# Patient Record
Sex: FEMALE | Race: WHITE | URBAN - METROPOLITAN AREA
[De-identification: names, ages, dates, MRNs, and addresses within clinical notes are randomized per-mention and may not be internally consistent; named-entity substitution may affect disease eponyms.]

---

## 2018-05-23 ENCOUNTER — HOSPITAL ENCOUNTER (OUTPATIENT)
Age: 83
Setting detail: SPECIMEN
Discharge: HOME | End: 2018-05-23
Payer: COMMERCIAL

## 2018-05-25 LAB — DERMATOLOGY PATHOLOGY REPORT: NORMAL

## 2018-12-11 ENCOUNTER — HOSPITAL ENCOUNTER (OUTPATIENT)
Age: 83
Setting detail: SPECIMEN
Discharge: HOME | End: 2018-12-11
Payer: MEDICARE

## 2018-12-13 LAB — DERMATOLOGY PATHOLOGY REPORT: NORMAL

## 2021-03-22 ENCOUNTER — TELEPHONE (OUTPATIENT)
Dept: GASTROENTEROLOGY | Age: 86
End: 2021-03-22

## 2021-04-08 ENCOUNTER — OFFICE VISIT (OUTPATIENT)
Dept: GASTROENTEROLOGY | Age: 86
End: 2021-04-08
Payer: COMMERCIAL

## 2021-04-08 VITALS
WEIGHT: 163 LBS | RESPIRATION RATE: 16 BRPM | SYSTOLIC BLOOD PRESSURE: 190 MMHG | DIASTOLIC BLOOD PRESSURE: 83 MMHG | HEIGHT: 66 IN | BODY MASS INDEX: 26.2 KG/M2 | TEMPERATURE: 97.7 F

## 2021-04-08 DIAGNOSIS — R19.7 DIARRHEA, UNSPECIFIED TYPE: Primary | ICD-10-CM

## 2021-04-08 PROCEDURE — 99204 OFFICE O/P NEW MOD 45 MIN: CPT | Performed by: INTERNAL MEDICINE

## 2021-04-08 RX ORDER — DIPHENOXYLATE HYDROCHLORIDE AND ATROPINE SULFATE 2.5; .025 MG/1; MG/1
1 TABLET ORAL 4 TIMES DAILY PRN
Qty: 40 TABLET | Refills: 1 | Status: SHIPPED | OUTPATIENT
Start: 2021-04-08 | End: 2021-04-18

## 2021-04-08 RX ORDER — MONTELUKAST SODIUM 4 MG/1
1 TABLET, CHEWABLE ORAL 2 TIMES DAILY
Qty: 60 TABLET | Refills: 3 | Status: SHIPPED | OUTPATIENT
Start: 2021-04-08

## 2021-04-08 ASSESSMENT — ENCOUNTER SYMPTOMS
TROUBLE SWALLOWING: 0
ABDOMINAL PAIN: 1
NAUSEA: 0
BLOOD IN STOOL: 0
CONSTIPATION: 0
DIARRHEA: 1
CHOKING: 0
COUGH: 0
VOMITING: 0
WHEEZING: 0
ABDOMINAL DISTENTION: 0
ANAL BLEEDING: 0
RECTAL PAIN: 0

## 2021-04-08 NOTE — PROGRESS NOTES
Reason for Referral:   No referring provider defined for this encounter. Chief Complaint   Patient presents with    Diarrhea     Patient is new, referred for diarrhea. She states she has had diarrhea since she had her gallbladder out years ago. She states she got COVID last year and was put on abx. After that, she has had severe diarrhea. She states her last 2 colonoscopies were unable to finish because of \"a bowel obstruction\"           HISTORY OF PRESENT ILLNESS: Pat Nash is a 80 y.o. female , referred for evaluation ofdiarrhea  . This pleasant elderly lady is here for the first time, referred because of  diarrha as above, been seen by  for that couple trials of colonoscopy were not successful to complete, no biopsies were taken from the colon. She had this diarrhea for at least 60 years she said, she has been told multiple things about it and never left her, and right now is getting very disturbing and she is very limited and not able to enjoy her life at this time. The patient is remarkably oriented and smart and knows her medical history very well. She thinks the diarrhea started just when she had her gallbladder done years ago. Denied any blood in the stool, depends about what she eats she might have 3-4 bowel movements a day. No weight loss but she cannot keep weight on her she said because of the diarrhea. She denied any fever or chills and review of system with her otherwise on remarkable  Labs in 2014: anemia, elevated LFts)   but now this year labs in [facility], normal LFts and HGB , liver low last year only, when she thinks had  Katherine Finders /2020, this is before the Covid was known and there is no confirmation for it but the patient's were her life that is what happened because she lost her smell and taste and had cold symptoms and respiratory issues.   At present  Diarrhea 3x /day gas and bloating   Ct 3/2020 : negative   Denied any other symptoms  Findings:  Terminal ileum: not examined    Cecum/Ascending colon: not examined    Transverse colon: I believe I was able to reach proximal transverse colon visualization somewhat limited because of several blind spots in the colon    Descending/Sigmoid colon: Very redundant tortuous sigmoid colon. Sigmoid diverticulosis. Fair amount of blind spots in the colon. Rectum/Anus: examined in normal and retroflexed positions and was abnormal: Low rectal polyp. Removed with large cold biopsy forceps. Withdrawal Time was (minutes): 20    The colon was decompressed and the scope was removed. The patient tolerated the procedure well. Recommendations/Plan:   1. Lifestyle and dietary modifications as discussed  2. F/U Biopsies  3. F/U In Office in 3-4 weeks  4. Discussed with the family  5. Air contrast barium enema as an outpatient. 6. Office follow-up after the barium enema. Electronically signed by Vandana Nielson MD on 2/20/2020 at 10:59 AM              Past Medical,Family, and Social History reviewed and does contribute to the patient presentingcondition. Patient's PMH/PSH,SH,PSYCH Hx, MEDs, ALLERGIES, and ROS were all reviewed and updated in the appropriate sections.     PAST MEDICAL HISTORY:  Past Medical History:   Diagnosis Date    Accelerating angina ([address]     Arthritis     CAD (coronary artery disease) 5*9/14    cabg    Carotid bruit     bilateral     DM (diabetes mellitus) ([address]     HTN (hypertension)     Hypothyroid     Mitral regurgitation     mild     Paroxysmal A-fib ([address]     Not on coumain per pt's wishes     Pericarditis     2009       Past Surgical History:   Procedure Laterality Date    BREAST LUMPECTOMY      CARDIAC CATHETERIZATION      2009, non critical at that time   [address] GRAFT  5/9/2014    CABG X 6    HYSTERECTOMY      SHOULDER SURGERY      TONSILLECTOMY         CURRENT MEDICATIONS:    Current Outpatient Medications:    colestipol (COLESTID) 1 g tablet, Take 1 tablet by mouth 2 times daily, Disp: 60 tablet, Rfl: 3    diphenoxylate-atropine (DIPHENATOL) 2.5-0.025 MG per tablet, Take 1 tablet by mouth 4 times daily as needed for Diarrhea for up to 10 days. , Disp: 40 tablet, Rfl: 1    levothyroxine (SYNTHROID) 150 MCG tablet, Take 150 mcg by mouth Daily. , Disp: , Rfl:     aspirin 81 MG tablet, Take 81 mg by mouth daily. , Disp: , Rfl:     Coenzyme Q-10 100 MG CAPS, Take 1 tablet by mouth daily. , Disp: , Rfl:     atorvastatin (LIPITOR) 10 MG tablet, Take 10 mg by mouth daily. , Disp: , Rfl:     Multiple Vitamins-Minerals (THERAPEUTIC MULTIVITAMIN-MINERALS) tablet, Take 1 tablet by mouth daily. , Disp: , Rfl:     estrogens, conjugated, (PREMARIN) 0.3 MG tablet, Take 0.3 mg by mouth daily. , Disp: , Rfl:     metoprolol (TOPROL-XL) 50 MG XL tablet, Take 50 mg by mouth daily. , Disp: , Rfl:     ALLERGIES:   Allergies   Allergen Reactions    Codeine Palpitations       FAMILY HISTORY:       Problem Relation Age of Onset    Coronary Art Dis Mother     Coronary Art Dis Father          SOCIAL HISTORY:   Social History     Socioeconomic History    Marital status:       Spouse name: Not on file    Number of children: Not on file    Years of education: Not on file    Highest education level: Not on file   Occupational History    Not on file   Social Needs    Financial resource strain: Not on file    Food insecurity     Worry: Not on file     Inability: Not on file    Transportation needs     Medical: Not on file     Non-medical: Not on file   Tobacco Use    Smoking status: Never Smoker    Smokeless tobacco: Never Used   Substance and Sexual Activity    Alcohol use: No    Drug use: No    Sexual activity: Not on file   Lifestyle    Physical activity     Days per week: Not on file     Minutes per session: Not on file    Stress: Not on file   Relationships    Social connections     Talks on phone: Not on file     Gets together: Not on file     Attends Buddhist service: Not on file     Active member of club or organization: Not on file     Attends meetings of clubs or organizations: Not on file     Relationship status: Not on file    Intimate partner violence     Fear of current or ex partner: Not on file     Emotionally abused: Not on file     Physically abused: Not on file     Forced sexual activity: Not on file   Other Topics Concern    Not on file   Social History Narrative    Not on file       REVIEW OF SYSTEMS: A 12-point review of systemswas obtained and pertinent positives and negatives were enumerated above in the history of present illness. All other reviewed systems / symptoms were negative. Review of Systems   Constitutional: Positive for fatigue. Negative for appetite change and unexpected weight change. HENT: Negative for trouble swallowing. Eyes: Positive for visual disturbance (glasses). Respiratory: Negative for cough, choking and wheezing. Cardiovascular: Negative for chest pain, palpitations and leg swelling. Gastrointestinal: Positive for abdominal pain and diarrhea. Negative for abdominal distention, anal bleeding, blood in stool, constipation, nausea, rectal pain and vomiting. Genitourinary: Negative for difficulty urinating. Allergic/Immunologic: Negative for environmental allergies and food allergies. Neurological: Negative for dizziness, weakness, light-headedness, numbness and headaches. Hematological: Bruises/bleeds easily. Psychiatric/Behavioral: Negative for sleep disturbance. The patient is not nervous/anxious.             LABORATORY DATA: Reviewed  Lab Results   Component Value Date    WBC 7.1 05/14/2014    HGB 8.7 (L) 05/14/2014    HCT 25.7 (L) 05/14/2014    .1 (H) 05/14/2014     05/14/2014     05/14/2014    K 3.9 05/14/2014    CL 99 05/14/2014    CO2 29 05/14/2014    BUN 19 05/14/2014    CREATININE 0.71 05/14/2014    LABALBU 3.8 05/08/2014    BILITOT or rash. Comments: She is not diaphoretic   Neurological:      Mental Status: She is alert and oriented to person, place, and time. Deep Tendon Reflexes: Reflexes are normal and symmetric. Psychiatric:         Behavior: Behavior normal.         Thought Content: Thought content normal.         Judgment: Judgment normal.         IMPRESSION: Ms. Aldo Monterroso is a 80 y.o. female with      Diagnosis Orders   1. 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 Thank you for allowing me to participate in the care of Ms. Aldo Monterroso. For any further questions please do not hesitate to contact me. I have reviewed and agree with the MA/RN ROS. Note is dictated utilizing voice recognition software. Unfortunately this leads to occasional typographical errors. Please contact our office if you have any questions.     Ranulfo Vicente MD  [facility]  O: #[phone]

## 2021-05-27 ENCOUNTER — OFFICE VISIT (OUTPATIENT)
Dept: GASTROENTEROLOGY | Age: 86
End: 2021-05-27
Payer: COMMERCIAL

## 2021-05-27 VITALS
BODY MASS INDEX: 27.16 KG/M2 | WEIGHT: 163 LBS | HEIGHT: 65 IN | DIASTOLIC BLOOD PRESSURE: 59 MMHG | SYSTOLIC BLOOD PRESSURE: 129 MMHG

## 2021-05-27 DIAGNOSIS — R19.7 DIARRHEA, UNSPECIFIED TYPE: Primary | ICD-10-CM

## 2021-05-27 DIAGNOSIS — R14.0 BLOATING: ICD-10-CM

## 2021-05-27 PROCEDURE — 99214 OFFICE O/P EST MOD 30 MIN: CPT | Performed by: INTERNAL MEDICINE

## 2021-05-27 RX ORDER — TRIAMTERENE AND HYDROCHLOROTHIAZIDE 37.5; 25 MG/1; MG/1
CAPSULE ORAL DAILY
COMMUNITY
Start: 2021-01-19

## 2021-05-27 RX ORDER — GREEN TEA/HOODIA GORDONII 315-12.5MG
1 CAPSULE ORAL DAILY
Qty: 30 TABLET | Refills: 0 | Status: SHIPPED | OUTPATIENT
Start: 2021-05-27 | End: 2021-06-26

## 2021-05-27 RX ORDER — ISOSORBIDE MONONITRATE 60 MG/1
60 TABLET, EXTENDED RELEASE ORAL DAILY
COMMUNITY
Start: 2020-11-05

## 2021-05-27 RX ORDER — NITROGLYCERIN 0.4 MG/1
0.4 TABLET SUBLINGUAL EVERY 5 MIN PRN
COMMUNITY

## 2021-05-27 ASSESSMENT — ENCOUNTER SYMPTOMS
GASTROINTESTINAL NEGATIVE: 1
ALLERGIC/IMMUNOLOGIC NEGATIVE: 1
EYES NEGATIVE: 1
RESPIRATORY NEGATIVE: 1

## 2021-05-27 NOTE — PROGRESS NOTES
GI CLINIC FOLLOW UP    INTERVAL HISTORY:   No referring provider defined for this encounter. Chief Complaint   Patient presents with    Diarrhea     per patient this is getting a lot better with the med that was prescribed     Gas     pt states that she is still having gas            HISTORY OF PRESENT ILLNESS: Fabrizio Reyes is a 80 y.o. female , referred for evaluation of*diarrhea  Patient is here for follow-up we have seen her last visit for diarrhea, because of her age and her wellness not to be studied with a lot of testing, I started her on  Colestid, she said this medication has resolved the diarrhea, she did not need the lomotil  Today she is saying that she been having a lot of gas, and a lot of flatus. Denied any bleeding denied any weight loss eating and drinking well she has no odynophagia no dysphagia no change in her bowel habits no bleeding  In 2014 she did have anemia and elevated liver enzymes on labs from [address] [facility] but when I looked at her labs from [address] [place] [address] in the last year everything was normal     past Medical,Family, and Social History reviewed and does contribute to the patient presentingcondition. Patient's PMH/PSH,SH,PSYCH Hx, MEDs, ALLERGIES, and ROS were all reviewed and updated in the appropriate sections.     PAST MEDICAL HISTORY:  Past Medical History:   Diagnosis Date    Accelerating angina ([address]     Arthritis     CAD (coronary artery disease) 5*9/14    cabg    Carotid bruit     bilateral     DM (diabetes mellitus) ([address]     HTN (hypertension)     Hypothyroid     Mitral regurgitation     mild     Paroxysmal A-fib ([address]     Not on coumain per pt's wishes     Pericarditis     2009       Past Surgical History:   Procedure Laterality Date    BREAST LUMPECTOMY      CARDIAC CATHETERIZATION      2009, non critical at that time   [address] GRAFT  5/9/2014    CABG X 6    HYSTERECTOMY      SHOULDER SURGERY      TONSILLECTOMY CURRENT MEDICATIONS:    Current Outpatient Medications:     isosorbide mononitrate (IMDUR) 60 MG extended release tablet, Take 60 mg by mouth daily, Disp: , Rfl:     nitroGLYCERIN (NITROSTAT) 0.4 MG SL tablet, Place 0.4 mg under the tongue every 5 minutes as needed, Disp: , Rfl:     triamterene-hydroCHLOROthiazide (DYAZIDE) 37.5-25 MG per capsule, daily, Disp: , Rfl:     Probiotic Acidophilus (FLORANEX) TABS, Take 1 tablet by mouth daily, Disp: 30 tablet, Rfl: 0    colestipol (COLESTID) 1 g tablet, Take 1 tablet by mouth 2 times daily, Disp: 60 tablet, Rfl: 3    levothyroxine (SYNTHROID) 150 MCG tablet, Take 150 mcg by mouth Daily. , Disp: , Rfl:     aspirin 81 MG tablet, Take 81 mg by mouth daily. , Disp: , Rfl:     Coenzyme Q-10 100 MG CAPS, Take 1 tablet by mouth daily. , Disp: , Rfl:     atorvastatin (LIPITOR) 10 MG tablet, Take 10 mg by mouth daily. , Disp: , Rfl:     Multiple Vitamins-Minerals (THERAPEUTIC MULTIVITAMIN-MINERALS) tablet, Take 1 tablet by mouth daily. , Disp: , Rfl:     estrogens, conjugated, (PREMARIN) 0.3 MG tablet, Take 0.3 mg by mouth daily. , Disp: , Rfl:     metoprolol (TOPROL-XL) 50 MG XL tablet, Take 50 mg by mouth daily. , Disp: , Rfl:     ALLERGIES:   Allergies   Allergen Reactions    Codeine Palpitations       FAMILY HISTORY:       Problem Relation Age of Onset    Coronary Art Dis Mother     Coronary Art Dis Father          SOCIAL HISTORY:   Social History     Socioeconomic History    Marital status:       Spouse name: Not on file    Number of children: Not on file    Years of education: Not on file    Highest education level: Not on file   Occupational History    Not on file   Tobacco Use    Smoking status: Never Smoker    Smokeless tobacco: Never Used   Vaping Use    Vaping Use: Never used   Substance and Sexual Activity    Alcohol use: No    Drug use: No    Sexual activity: Not on file   Other Topics Concern    Not on file   Social History Narrative  Not on file     Social Determinants of Health     Financial Resource Strain:     Difficulty of Paying Living Expenses:    Food Insecurity:     Worried About Running Out of Food in the Last Year:     [address] in the Last Year:    Transportation Needs:     Lack of Transportation (Medical):  Lack of Transportation (Non-Medical):    Physical Activity:     Days of Exercise per Week:     Minutes of Exercise per Session:    Stress:     Feeling of Stress :    Social Connections:     Frequency of Communication with Friends and Family:     Frequency of Social Gatherings with Friends and Family:     Attends Hinduism Services:     Active Member of Clubs or Organizations:     Attends Club or Organization Meetings:     Marital Status:    Intimate Partner Violence:     Fear of Current or Ex-Partner:     Emotionally Abused:     Physically Abused:     Sexually Abused:        REVIEW OF SYSTEMS: A 12-point review of systemswas obtained and pertinent positives and negatives were enumerated above in the history of present illness. All other reviewed systems / symptoms were negative. Review of Systems   Constitutional: Negative. HENT: Negative. Eyes: Negative. Respiratory: Negative. Cardiovascular: Negative. Gastrointestinal: Negative. Endocrine: Negative. Genitourinary: Negative. Musculoskeletal: Negative. Skin: Negative. Allergic/Immunologic: Negative. Neurological: Negative. Hematological: Negative. Psychiatric/Behavioral: Negative.             LABORATORY DATA: Reviewed  Lab Results   Component Value Date    WBC 7.1 05/14/2014    HGB 8.7 (L) 05/14/2014    HCT 25.7 (L) 05/14/2014    .1 (H) 05/14/2014     05/14/2014     05/14/2014    K 3.9 05/14/2014    CL 99 05/14/2014    CO2 29 05/14/2014    BUN 19 05/14/2014    CREATININE 0.71 05/14/2014    LABALBU 3.8 05/08/2014    BILITOT 0.30 05/08/2014    ALKPHOS 52 05/08/2014    AST 38 (H) 05/08/2014 ALT 37 (H) 05/08/2014    INR 1.2 05/09/2014         Lab Results   Component Value Date    RBC 2.54 (L) 05/14/2014    HGB 8.7 (L) 05/14/2014    .1 (H) 05/14/2014    MCH 34.2 (H) 05/14/2014    MCHC 33.8 05/14/2014    RDW 20.8 (H) 05/14/2014    MPV 9.2 05/14/2014    BASOPCT 0 05/11/2014    LYMPHSABS 0.50 (L) 05/11/2014    MONOSABS 0.50 05/11/2014    NEUTROABS 7.90 (H) 05/11/2014    EOSABS 0.00 05/11/2014    BASOSABS 0.00 05/11/2014         DIAGNOSTIC TESTING:     No results found. PHYSICAL EXAMINATION: Vital signs reviewed per the nursing documentation. BP (!) 129/59 (Site: Right Upper Arm, Position: Sitting, Cuff Size: Medium Adult)   Ht 5' 5\" (1.651 m)   Wt 163 lb (73.9 kg)   BMI 27.12 kg/m²   Body mass index is 27.12 kg/m². Physical Exam  Vitals and nursing note reviewed. Constitutional:       General: She is not in acute distress. Appearance: She is well-developed. She is not diaphoretic. HENT:      Head: Normocephalic. Mouth/Throat:      Pharynx: No oropharyngeal exudate. Eyes:      General: No scleral icterus. Pupils: Pupils are equal, round, and reactive to light. Neck:      Thyroid: No thyromegaly. Vascular: No JVD. Trachea: No tracheal deviation. Cardiovascular:      Rate and Rhythm: Normal rate and regular rhythm. Heart sounds: Normal heart sounds. No murmur heard. Pulmonary:      Effort: Pulmonary effort is normal. No respiratory distress. Breath sounds: Normal breath sounds. No wheezing. Abdominal:      General: Bowel sounds are normal. There is no distension. Palpations: Abdomen is soft. Tenderness: There is no abdominal tenderness. There is no guarding or rebound. Comments: No ascites   Musculoskeletal:         General: Normal range of motion. Cervical back: Normal range of motion and neck supple. Skin:     General: Skin is warm. Coloration: Skin is not pale. Findings: No erythema or rash.       Comments:

## 2021-06-22 RX ORDER — PROBIOTIC PRODUCT - TAB 1B-250 MG
TAB ORAL
Qty: 30 TABLET | Refills: 0 | Status: SHIPPED | OUTPATIENT
Start: 2021-06-22

## 2021-06-29 ENCOUNTER — TELEPHONE (OUTPATIENT)
Dept: GASTROENTEROLOGY | Age: 86
End: 2021-06-29

## 2021-06-29 NOTE — TELEPHONE ENCOUNTER
Patient called office stating that she still has diarrhea. Thinks that she has a bowel obstruction. Would like a call back to discuss.

## 2021-06-30 NOTE — TELEPHONE ENCOUNTER
Writer LM for patient stating if she still needs assistance to call office.   If she is having severe diarrhea and is feeling more tired or weak than normal to go to local ER

## 2022-04-04 ENCOUNTER — HOSPITAL ENCOUNTER (INPATIENT)
Age: 87
LOS: 2 days | Discharge: HOME | DRG: 177 | End: 2022-04-08
Attending: EMERGENCY MEDICINE | Admitting: FAMILY MEDICINE
Payer: MEDICARE

## 2022-04-04 ENCOUNTER — APPOINTMENT (OUTPATIENT)
Dept: GENERAL RADIOLOGY | Age: 87
DRG: 177 | End: 2022-04-04
Payer: MEDICARE

## 2022-04-04 DIAGNOSIS — J10.1 INFLUENZA A: ICD-10-CM

## 2022-04-04 DIAGNOSIS — R06.02 SHORTNESS OF BREATH: Primary | ICD-10-CM

## 2022-04-04 DIAGNOSIS — I10 PRIMARY HYPERTENSION: ICD-10-CM

## 2022-04-04 DIAGNOSIS — I25.10 ASCVD (ARTERIOSCLEROTIC CARDIOVASCULAR DISEASE): ICD-10-CM

## 2022-04-04 DIAGNOSIS — R07.9 CHEST PAIN, UNSPECIFIED TYPE: ICD-10-CM

## 2022-04-04 PROBLEM — J18.9 PNEUMONIA: Status: ACTIVE | Noted: 2022-04-04

## 2022-04-04 PROBLEM — E03.9 HYPOTHYROIDISM: Status: ACTIVE | Noted: 2022-04-04

## 2022-04-04 LAB
ABSOLUTE EOS #: 0 K/UL (ref 0–0.4)
ABSOLUTE LYMPH #: 0.8 K/UL (ref 1–4.8)
ABSOLUTE MONO #: 0.3 K/UL (ref 0.1–1.3)
ANION GAP SERPL CALCULATED.3IONS-SCNC: 17 MMOL/L (ref 9–17)
BASOPHILS # BLD: 0 % (ref 0–2)
BASOPHILS ABSOLUTE: 0 K/UL (ref 0–0.2)
BUN BLDV-MCNC: 30 MG/DL (ref 8–23)
CALCIUM SERPL-MCNC: 9.2 MG/DL (ref 8.6–10.4)
CHLORIDE BLD-SCNC: 100 MMOL/L (ref 98–107)
CO2: 19 MMOL/L (ref 20–31)
CREAT SERPL-MCNC: 1.21 MG/DL (ref 0.5–0.9)
EOSINOPHILS RELATIVE PERCENT: 0 % (ref 0–4)
GFR AFRICAN AMERICAN: 51 ML/MIN
GFR NON-AFRICAN AMERICAN: 42 ML/MIN
GFR SERPL CREATININE-BSD FRML MDRD: ABNORMAL ML/MIN/{1.73_M2}
GLUCOSE BLD-MCNC: 172 MG/DL (ref 70–99)
GLUCOSE BLD-MCNC: 334 MG/DL (ref 65–105)
HCT VFR BLD CALC: 44.6 % (ref 36–46)
HEMOGLOBIN: 15.2 G/DL (ref 12–16)
INFLUENZA A: DETECTED
INFLUENZA B: NOT DETECTED
LYMPHOCYTES # BLD: 14 % (ref 24–44)
MCH RBC QN AUTO: 36.4 PG (ref 26–34)
MCHC RBC AUTO-ENTMCNC: 34 G/DL (ref 31–37)
MCV RBC AUTO: 107 FL (ref 80–100)
MONOCYTES # BLD: 5 % (ref 1–7)
PDW BLD-RTO: 13.5 % (ref 11.5–14.9)
PLATELET # BLD: 164 K/UL (ref 150–450)
PMV BLD AUTO: 9.8 FL (ref 6–12)
POTASSIUM SERPL-SCNC: 5.1 MMOL/L (ref 3.7–5.3)
POTASSIUM SERPL-SCNC: 5.7 MMOL/L (ref 3.7–5.3)
PRO-BNP: 719 PG/ML
RBC # BLD: 4.17 M/UL (ref 4–5.2)
SARS-COV-2 RNA, RT PCR: NOT DETECTED
SEG NEUTROPHILS: 81 % (ref 36–66)
SEGMENTED NEUTROPHILS ABSOLUTE COUNT: 4.5 K/UL (ref 1.3–9.1)
SODIUM BLD-SCNC: 136 MMOL/L (ref 135–144)
SOURCE: ABNORMAL
SPECIMEN DESCRIPTION: ABNORMAL
TROPONIN, HIGH SENSITIVITY: 32 NG/L (ref 0–14)
TROPONIN, HIGH SENSITIVITY: 36 NG/L (ref 0–14)
WBC # BLD: 5.6 K/UL (ref 3.5–11)

## 2022-04-04 PROCEDURE — G0378 HOSPITAL OBSERVATION PER HR: HCPCS

## 2022-04-04 PROCEDURE — 80048 BASIC METABOLIC PNL TOTAL CA: CPT

## 2022-04-04 PROCEDURE — 96372 THER/PROPH/DIAG INJ SC/IM: CPT

## 2022-04-04 PROCEDURE — 83880 ASSAY OF NATRIURETIC PEPTIDE: CPT

## 2022-04-04 PROCEDURE — 96375 TX/PRO/DX INJ NEW DRUG ADDON: CPT

## 2022-04-04 PROCEDURE — 99284 EMERGENCY DEPT VISIT MOD MDM: CPT

## 2022-04-04 PROCEDURE — 96374 THER/PROPH/DIAG INJ IV PUSH: CPT

## 2022-04-04 PROCEDURE — 2580000003 HC RX 258: Performed by: FAMILY MEDICINE

## 2022-04-04 PROCEDURE — 6370000000 HC RX 637 (ALT 250 FOR IP): Performed by: FAMILY MEDICINE

## 2022-04-04 PROCEDURE — 96367 TX/PROPH/DG ADDL SEQ IV INF: CPT

## 2022-04-04 PROCEDURE — 85025 COMPLETE CBC W/AUTO DIFF WBC: CPT

## 2022-04-04 PROCEDURE — 2500000003 HC RX 250 WO HCPCS: Performed by: FAMILY MEDICINE

## 2022-04-04 PROCEDURE — 71045 X-RAY EXAM CHEST 1 VIEW: CPT

## 2022-04-04 PROCEDURE — 2580000003 HC RX 258: Performed by: STUDENT IN AN ORGANIZED HEALTH CARE EDUCATION/TRAINING PROGRAM

## 2022-04-04 PROCEDURE — 6370000000 HC RX 637 (ALT 250 FOR IP): Performed by: STUDENT IN AN ORGANIZED HEALTH CARE EDUCATION/TRAINING PROGRAM

## 2022-04-04 PROCEDURE — 82947 ASSAY GLUCOSE BLOOD QUANT: CPT

## 2022-04-04 PROCEDURE — 2060000000 HC ICU INTERMEDIATE R&B

## 2022-04-04 PROCEDURE — 6360000002 HC RX W HCPCS: Performed by: STUDENT IN AN ORGANIZED HEALTH CARE EDUCATION/TRAINING PROGRAM

## 2022-04-04 PROCEDURE — 96365 THER/PROPH/DIAG IV INF INIT: CPT

## 2022-04-04 PROCEDURE — 84484 ASSAY OF TROPONIN QUANT: CPT

## 2022-04-04 PROCEDURE — 93005 ELECTROCARDIOGRAM TRACING: CPT | Performed by: STUDENT IN AN ORGANIZED HEALTH CARE EDUCATION/TRAINING PROGRAM

## 2022-04-04 PROCEDURE — 87636 SARSCOV2 & INF A&B AMP PRB: CPT

## 2022-04-04 PROCEDURE — 84132 ASSAY OF SERUM POTASSIUM: CPT

## 2022-04-04 PROCEDURE — 36415 COLL VENOUS BLD VENIPUNCTURE: CPT

## 2022-04-04 RX ORDER — LANOLIN ALCOHOL/MO/W.PET/CERES
5 CREAM (GRAM) TOPICAL NIGHTLY PRN
Status: DISCONTINUED | OUTPATIENT
Start: 2022-04-04 | End: 2022-04-08 | Stop reason: HOSPADM

## 2022-04-04 RX ORDER — ATORVASTATIN CALCIUM 20 MG/1
10 TABLET, FILM COATED ORAL DAILY
Status: DISCONTINUED | OUTPATIENT
Start: 2022-04-04 | End: 2022-04-08 | Stop reason: HOSPADM

## 2022-04-04 RX ORDER — POTASSIUM CHLORIDE 20 MEQ/1
40 TABLET, EXTENDED RELEASE ORAL PRN
Status: DISCONTINUED | OUTPATIENT
Start: 2022-04-04 | End: 2022-04-05

## 2022-04-04 RX ORDER — ISOSORBIDE MONONITRATE 60 MG/1
60 TABLET, EXTENDED RELEASE ORAL DAILY
Status: DISCONTINUED | OUTPATIENT
Start: 2022-04-04 | End: 2022-04-08 | Stop reason: HOSPADM

## 2022-04-04 RX ORDER — ONDANSETRON 2 MG/ML
4 INJECTION INTRAMUSCULAR; INTRAVENOUS EVERY 6 HOURS PRN
Status: DISCONTINUED | OUTPATIENT
Start: 2022-04-04 | End: 2022-04-08 | Stop reason: HOSPADM

## 2022-04-04 RX ORDER — ATORVASTATIN CALCIUM 10 MG/1
10 TABLET, FILM COATED ORAL DAILY
COMMUNITY

## 2022-04-04 RX ORDER — SODIUM CHLORIDE 0.9 % (FLUSH) 0.9 %
10 SYRINGE (ML) INJECTION EVERY 12 HOURS SCHEDULED
Status: DISCONTINUED | OUTPATIENT
Start: 2022-04-04 | End: 2022-04-08 | Stop reason: HOSPADM

## 2022-04-04 RX ORDER — SODIUM CHLORIDE 0.9 % (FLUSH) 0.9 %
5-40 SYRINGE (ML) INJECTION PRN
Status: DISCONTINUED | OUTPATIENT
Start: 2022-04-04 | End: 2022-04-08 | Stop reason: HOSPADM

## 2022-04-04 RX ORDER — ESTRADIOL 0.5 MG/1
0.5 TABLET ORAL DAILY
COMMUNITY

## 2022-04-04 RX ORDER — DEXTROSE MONOHYDRATE 25 G/50ML
25 INJECTION, SOLUTION INTRAVENOUS ONCE
Status: COMPLETED | OUTPATIENT
Start: 2022-04-04 | End: 2022-04-04

## 2022-04-04 RX ORDER — SODIUM CHLORIDE 9 MG/ML
INJECTION, SOLUTION INTRAVENOUS PRN
Status: DISCONTINUED | OUTPATIENT
Start: 2022-04-04 | End: 2022-04-08 | Stop reason: HOSPADM

## 2022-04-04 RX ORDER — ACETAMINOPHEN 650 MG/1
650 SUPPOSITORY RECTAL EVERY 6 HOURS PRN
Status: DISCONTINUED | OUTPATIENT
Start: 2022-04-04 | End: 2022-04-08 | Stop reason: HOSPADM

## 2022-04-04 RX ORDER — ALLOPURINOL 300 MG/1
300 TABLET ORAL DAILY
COMMUNITY

## 2022-04-04 RX ORDER — LEVOTHYROXINE SODIUM 0.12 MG/1
125 TABLET ORAL DAILY
COMMUNITY

## 2022-04-04 RX ORDER — IPRATROPIUM BROMIDE AND ALBUTEROL SULFATE 2.5; .5 MG/3ML; MG/3ML
1 SOLUTION RESPIRATORY (INHALATION) EVERY 4 HOURS PRN
Status: DISCONTINUED | OUTPATIENT
Start: 2022-04-04 | End: 2022-04-08 | Stop reason: HOSPADM

## 2022-04-04 RX ORDER — ONDANSETRON 2 MG/ML
4 INJECTION INTRAMUSCULAR; INTRAVENOUS ONCE
Status: COMPLETED | OUTPATIENT
Start: 2022-04-04 | End: 2022-04-04

## 2022-04-04 RX ORDER — ACETAMINOPHEN 325 MG/1
650 TABLET ORAL EVERY 6 HOURS PRN
Status: DISCONTINUED | OUTPATIENT
Start: 2022-04-04 | End: 2022-04-08 | Stop reason: HOSPADM

## 2022-04-04 RX ORDER — METOPROLOL SUCCINATE 25 MG/1
25 TABLET, EXTENDED RELEASE ORAL DAILY
COMMUNITY

## 2022-04-04 RX ORDER — OSELTAMIVIR PHOSPHATE 30 MG/1
30 CAPSULE ORAL ONCE
Status: COMPLETED | OUTPATIENT
Start: 2022-04-04 | End: 2022-04-04

## 2022-04-04 RX ORDER — ALLOPURINOL 300 MG/1
300 TABLET ORAL DAILY
Status: DISCONTINUED | OUTPATIENT
Start: 2022-04-04 | End: 2022-04-08 | Stop reason: HOSPADM

## 2022-04-04 RX ORDER — ONDANSETRON 2 MG/ML
4 INJECTION INTRAMUSCULAR; INTRAVENOUS EVERY 6 HOURS PRN
Status: DISCONTINUED | OUTPATIENT
Start: 2022-04-04 | End: 2022-04-04 | Stop reason: SDUPTHER

## 2022-04-04 RX ORDER — OSELTAMIVIR PHOSPHATE 6 MG/ML
30 FOR SUSPENSION ORAL 2 TIMES DAILY
Status: DISCONTINUED | OUTPATIENT
Start: 2022-04-04 | End: 2022-04-04 | Stop reason: SDUPTHER

## 2022-04-04 RX ORDER — ISOSORBIDE MONONITRATE 60 MG/1
60 TABLET, EXTENDED RELEASE ORAL DAILY
COMMUNITY

## 2022-04-04 RX ORDER — DOXYCYCLINE 100 MG/1
100 CAPSULE ORAL EVERY 12 HOURS SCHEDULED
Status: DISCONTINUED | OUTPATIENT
Start: 2022-04-04 | End: 2022-04-08 | Stop reason: HOSPADM

## 2022-04-04 RX ORDER — ACETAMINOPHEN 325 MG/1
650 TABLET ORAL EVERY 4 HOURS PRN
Status: DISCONTINUED | OUTPATIENT
Start: 2022-04-04 | End: 2022-04-04 | Stop reason: SDUPTHER

## 2022-04-04 RX ORDER — MAGNESIUM SULFATE 1 G/100ML
1000 INJECTION INTRAVENOUS PRN
Status: DISCONTINUED | OUTPATIENT
Start: 2022-04-04 | End: 2022-04-05

## 2022-04-04 RX ORDER — ONDANSETRON 4 MG/1
4 TABLET, ORALLY DISINTEGRATING ORAL EVERY 8 HOURS PRN
Status: DISCONTINUED | OUTPATIENT
Start: 2022-04-04 | End: 2022-04-08 | Stop reason: HOSPADM

## 2022-04-04 RX ORDER — POTASSIUM CHLORIDE 7.45 MG/ML
10 INJECTION INTRAVENOUS PRN
Status: DISCONTINUED | OUTPATIENT
Start: 2022-04-04 | End: 2022-04-05

## 2022-04-04 RX ORDER — SODIUM CHLORIDE 0.9 % (FLUSH) 0.9 %
5-40 SYRINGE (ML) INJECTION EVERY 12 HOURS SCHEDULED
Status: DISCONTINUED | OUTPATIENT
Start: 2022-04-04 | End: 2022-04-08 | Stop reason: HOSPADM

## 2022-04-04 RX ORDER — ONDANSETRON 4 MG/1
4 TABLET, ORALLY DISINTEGRATING ORAL EVERY 8 HOURS PRN
Status: DISCONTINUED | OUTPATIENT
Start: 2022-04-04 | End: 2022-04-04 | Stop reason: SDUPTHER

## 2022-04-04 RX ORDER — LEVOTHYROXINE SODIUM 0.12 MG/1
125 TABLET ORAL DAILY
Status: DISCONTINUED | OUTPATIENT
Start: 2022-04-05 | End: 2022-04-08 | Stop reason: HOSPADM

## 2022-04-04 RX ORDER — TRIAMTERENE AND HYDROCHLOROTHIAZIDE 37.5; 25 MG/1; MG/1
1 TABLET ORAL DAILY
COMMUNITY

## 2022-04-04 RX ORDER — OSELTAMIVIR PHOSPHATE 6 MG/ML
30 FOR SUSPENSION ORAL EVERY 24 HOURS
Status: DISCONTINUED | OUTPATIENT
Start: 2022-04-05 | End: 2022-04-05

## 2022-04-04 RX ORDER — SODIUM CHLORIDE 0.9 % (FLUSH) 0.9 %
10 SYRINGE (ML) INJECTION PRN
Status: DISCONTINUED | OUTPATIENT
Start: 2022-04-04 | End: 2022-04-08 | Stop reason: HOSPADM

## 2022-04-04 RX ORDER — METOPROLOL SUCCINATE 25 MG/1
25 TABLET, EXTENDED RELEASE ORAL DAILY
Status: DISCONTINUED | OUTPATIENT
Start: 2022-04-04 | End: 2022-04-08 | Stop reason: HOSPADM

## 2022-04-04 RX ADMIN — ONDANSETRON 4 MG: 2 INJECTION INTRAMUSCULAR; INTRAVENOUS at 16:42

## 2022-04-04 RX ADMIN — ALLOPURINOL 300 MG: 300 TABLET ORAL at 19:35

## 2022-04-04 RX ADMIN — METOPROLOL SUCCINATE 25 MG: 25 TABLET, EXTENDED RELEASE ORAL at 19:35

## 2022-04-04 RX ADMIN — INSULIN HUMAN 10 UNITS: 100 INJECTION, SOLUTION PARENTERAL at 18:32

## 2022-04-04 RX ADMIN — CEFTRIAXONE SODIUM 1000 MG: 1 INJECTION, POWDER, FOR SOLUTION INTRAMUSCULAR; INTRAVENOUS at 17:37

## 2022-04-04 RX ADMIN — OSELTAMIVIR PHOSPHATE 30 MG: 30 CAPSULE ORAL at 18:29

## 2022-04-04 RX ADMIN — Medication 25 G: at 18:30

## 2022-04-04 RX ADMIN — ENOXAPARIN SODIUM 30 MG: 100 INJECTION SUBCUTANEOUS at 18:40

## 2022-04-04 RX ADMIN — AZITHROMYCIN MONOHYDRATE 500 MG: 500 INJECTION, POWDER, LYOPHILIZED, FOR SOLUTION INTRAVENOUS at 18:40

## 2022-04-04 RX ADMIN — ATORVASTATIN CALCIUM 10 MG: 20 TABLET, FILM COATED ORAL at 19:34

## 2022-04-04 RX ADMIN — SODIUM CHLORIDE, PRESERVATIVE FREE 10 ML: 5 INJECTION INTRAVENOUS at 21:28

## 2022-04-04 RX ADMIN — DOXYCYCLINE 100 MG: 100 CAPSULE ORAL at 19:34

## 2022-04-04 RX ADMIN — Medication 4.5 MG: at 22:12

## 2022-04-04 ASSESSMENT — PAIN SCALES - GENERAL: PAINLEVEL_OUTOF10: 0

## 2022-04-04 ASSESSMENT — ENCOUNTER SYMPTOMS
ABDOMINAL PAIN: 0
COUGH: 1
VOMITING: 0
SHORTNESS OF BREATH: 1
BACK PAIN: 0
SORE THROAT: 0

## 2022-04-04 NOTE — Clinical Note
Discharge Plan[de-identified] Other/[facility])   Telemetry/Cardiac Monitoring Required?: Yes

## 2022-04-04 NOTE — PROGRESS NOTES
Medication History completed: All medications added this encounter. Medications confirmed with [facility].      Thank you,  Raul Lomax, PharmD, BCPS  [phone]

## 2022-04-04 NOTE — ED PROVIDER NOTES
[address]  Emergency Department Encounter  EmergencyMedicine Resident     Pt Pam Guillen  MRN: [id number]  Migelgflu 7/13/1930  Date of evaluation: 4/4/22  PCP:  No primary care provider on file. This patient was evaluated in the Emergency Department for symptoms described in the history of present illness. The patient was evaluated in the context of the global COVID-19 pandemic, which necessitated consideration that the patient might be at risk for infection with the SARS-CoV-2 virus that causes COVID-19. Institutional protocols and algorithms that pertain to the evaluation of patients at risk for COVID-19 are in a state of rapid change based on information released by regulatory bodies including the CDC and federal and state organizations. These policies and algorithms were followed during the patient's care in the ED. CHIEF COMPLAINT       Chief Complaint   Patient presents with    Shortness of Breath       HISTORY OF PRESENT ILLNESS  (Location/Symptom, Timing/Onset, Context/Setting, Quality, Duration, Modifying Factors, Severity.)      Keren Barroso is a 80 y.o. female who presents with shortness of breath onset at the doctor's office earlier today, states that she has been coughing for the last 3 days. Patient denies chest pain at this time. Non-smoker. History of Covid in October 2021, was on oxygen briefly after Covid, however currently no home oxygen. Denies history of COPD, CHF. Denies fevers, chills, nausea, vomiting, abdominal pain, diarrhea, dysuria or hematuria. Patient does have a significant cardiac history with around 5 bypasses per EMS, however not on record. Patient is ANO x4 at this time, is slightly tachypneic, however able to speak in full sentences. Per EMS, they put her on oxygen as she was satting in the low 90s on scene. PAST MEDICAL / SURGICAL / SOCIAL / FAMILY HISTORY      has no past medical history on file.      has no past surgical history on file.    Social History     Socioeconomic History    Marital status: Not on file     Spouse name: Not on file    Number of children: Not on file    Years of education: Not on file    Highest education level: Not on file   Occupational History    Not on file   Tobacco Use    Smoking status: Not on file    Smokeless tobacco: Not on file   Substance and Sexual Activity    Alcohol use: Not on file    Drug use: Not on file    Sexual activity: Not on file   Other Topics Concern    Not on file   Social History Narrative    Not on file     Social Determinants of Health     Financial Resource Strain:     Difficulty of Paying Living Expenses: Not on file   Food Insecurity:     Worried About Running Out of Food in the Last Year: Not on file    [place] of Food in the Last Year: Not on file   Transportation Needs:     Lack of Transportation (Medical): Not on file    Lack of Transportation (Non-Medical): Not on file   Physical Activity:     Days of Exercise per Week: Not on file    Minutes of Exercise per Session: Not on file   Stress:     Feeling of Stress : Not on file   Social Connections:     Frequency of Communication with Friends and Family: Not on file    Frequency of Social Gatherings with Friends and Family: Not on file    Attends Anabaptist Services: Not on file    Active Member of [address] [place] [address] [employer] or Organizations: Not on file    Attends Club or Organization Meetings: Not on file    Marital Status: Not on file   Intimate Partner Violence:     Fear of Current or Ex-Partner: Not on file    Emotionally Abused: Not on file    Physically Abused: Not on file    Sexually Abused: Not on file   Housing Stability:     Unable to Pay for Housing in the Last Year: Not on file    Number of [place] in the Last Year: Not on file    Unstable Housing in the Last Year: Not on file       No family history on file.     Allergies:  Codeine    Home Medications:  Prior to Admission medications    Not on File REVIEW OF SYSTEMS    (2-9 systems for level 4, 10 or more for level 5)      Review of Systems   Constitutional: Negative for chills and fever. HENT: Negative for sore throat. Eyes: Negative for visual disturbance. Respiratory: Positive for cough and shortness of breath. Cardiovascular: Negative for chest pain. Gastrointestinal: Negative for abdominal pain and vomiting. Endocrine: Negative for polyuria. Genitourinary: Negative for dysuria and hematuria. Musculoskeletal: Negative for back pain. Neurological: Negative for light-headedness and headaches. Psychiatric/Behavioral: Negative for confusion. PHYSICAL EXAM   (up to 7 for level 4, 8 or more for level 5)      INITIAL VITALS:   BP (!) 143/68   Pulse 92   Temp 98.8 °F (37.1 °C) (Oral)   Resp 20   Ht 5' 5\" (1.651 m)   Wt 145 lb (65.8 kg)   SpO2 95%   BMI 24.13 kg/m²     Physical Exam  Constitutional:       Appearance: She is well-developed. HENT:      Head: Normocephalic. Mouth/Throat:      Mouth: Mucous membranes are moist.   Eyes:      Extraocular Movements: Extraocular movements intact. Pupils: Pupils are equal, round, and reactive to light. Cardiovascular:      Rate and Rhythm: Normal rate and regular rhythm. Pulses: Normal pulses. Heart sounds: Normal heart sounds. Pulmonary:      Effort: Tachypnea present. Breath sounds: Examination of the right-lower field reveals rales. Examination of the left-lower field reveals rales. Rales present. No wheezing. Comments: Crackles at bilateral lung bases  Chest:      Chest wall: No tenderness. Abdominal:      Palpations: Abdomen is soft. Tenderness: There is no abdominal tenderness. Musculoskeletal:      Cervical back: Neck supple. Right lower leg: No tenderness. No edema. Left lower leg: No tenderness. No edema. Skin:     General: Skin is warm. Capillary Refill: Capillary refill takes less than 2 seconds.    Neurological: General: No focal deficit present. Mental Status: She is alert and oriented to person, place, and time. Psychiatric:         Mood and Affect: Mood normal.       DIFFERENTIAL  DIAGNOSIS     PLAN (LABS / IMAGING / EKG):  Orders Placed This Encounter   Procedures    COVID-19 & Influenza Combo    XR CHEST PORTABLE    CBC with Auto Differential    Basic Metabolic Panel    Troponin    Brain Natriuretic Peptide    Inpatient consult to Internal Medicine    EKG 12 Lead       MEDICATIONS ORDERED:  Orders Placed This Encounter   Medications    ondansetron (ZOFRAN) injection 4 mg    cefTRIAXone (ROCEPHIN) 1000 mg IVPB in 50 mL D5W minibag     Order Specific Question:   Antimicrobial Indications     Answer:   Pneumonia (CAP)    azithromycin (ZITHROMAX) 500 mg in D5W 250ml addavial     Order Specific Question:   Antimicrobial Indications     Answer:   Pneumonia (CAP)     DDX: Pneumonia, pleural effusion, fibrosis, Covid, influenza, CHF exacerbation, COPD exacerbation    DIAGNOSTIC RESULTS / EMERGENCY DEPARTMENT COURSE / MDM   LAB RESULTS:  Results for orders placed or performed during the hospital encounter of 04/04/22   COVID-19 & Influenza Combo    Specimen: Nasopharyngeal Swab   Result Value Ref Range    Specimen Description . NASOPHARYNGEAL SWAB     Source . NASOPHARYNGEAL SWAB     SARS-CoV-2 RNA, RT PCR Not Detected Not Detected    INFLUENZA A DETECTED (A) Not Detected    INFLUENZA B Not Detected Not Detected   CBC with Auto Differential   Result Value Ref Range    WBC 5.6 3.5 - 11.0 k/uL    RBC 4.17 4.0 - 5.2 m/uL    Hemoglobin 15.2 12.0 - 16.0 g/dL    Hematocrit 44.6 36 - 46 %    .0 (H) 80 - 100 fL    MCH 36.4 (H) 26 - 34 pg    MCHC 34.0 31 - 37 g/dL    RDW 13.5 11.5 - 14.9 %    Platelets 907 109 - 329 k/uL    MPV 9.8 6.0 - 12.0 fL    Seg Neutrophils 81 (H) 36 - 66 %    Lymphocytes 14 (L) 24 - 44 %    Monocytes 5 1 - 7 %    Eosinophils % 0 0 - 4 %    Basophils 0 0 - 2 %    Segs Absolute 4.50 1.3 - 9.1 k/uL    Absolute Lymph # 0.80 (L) 1.0 - 4.8 k/uL    Absolute Mono # 0.30 0.1 - 1.3 k/uL    Absolute Eos # 0.00 0.0 - 0.4 k/uL    Basophils Absolute 0.00 0.0 - 0.2 k/uL   Basic Metabolic Panel   Result Value Ref Range    Glucose 172 (H) 70 - 99 mg/dL    BUN 30 (H) 8 - 23 mg/dL    CREATININE 1.21 (H) 0.50 - 0.90 mg/dL    Calcium 9.2 8.6 - 10.4 mg/dL    Sodium 136 135 - 144 mmol/L    Potassium 5.7 (H) 3.7 - 5.3 mmol/L    Chloride 100 98 - 107 mmol/L    CO2 19 (L) 20 - 31 mmol/L    Anion Gap 17 9 - 17 mmol/L    GFR Non-African American 42 (L) >60 mL/min    GFR  51 (L) >60 mL/min    GFR Comment         Troponin   Result Value Ref Range    Troponin, High Sensitivity 36 (H) 0 - 14 ng/L   Brain Natriuretic Peptide   Result Value Ref Range    Pro- (H) <300 pg/mL   EKG 12 Lead   Result Value Ref Range    Ventricular Rate 90 BPM    Atrial Rate 90 BPM    P-R Interval 138 ms    QRS Duration 132 ms    Q-T Interval 412 ms    QTc Calculation (Bazett) 504 ms    P Axis 14 degrees    R Axis -46 degrees    T Axis 126 degrees       IMPRESSION: 80-year-old lady presents to the emergency department with 3-day history of cough and shortness of breath that started today. Denies fevers or chills. Denies chest pain. Significant history of cardiac pathology. Vital signs stable, slightly tachypneic. Physical exam does show that patient is tachypneic, however no accessory muscle use or nasal flaring. Crackles at bilateral lung bases. Cardiac work-up concerning for influenza A with possible left lower lobe pneumonia. Due to increased oxygen requirement and concerns for superimposed bacterial infection, Rocephin and Zithromax initiated. Patient to be admitted to [facility] for further care and management. RADIOLOGY:  See radiology report    EKG  Ventricular rate 90, sinus rhythm, left bundle branch block, , no significant ST elevation, depression.  No previous EKG for comparison    All EKG's are interpreted by the Emergency Department Physician who either signs or Co-signs this chart in the absence of a cardiologist.    EMERGENCY DEPARTMENT COURSE:  ED Course as of 04/04/22 1728 Mon Apr 04, 2022   1642 CXR Possible left lower lobe pneumonia [EM]   1725 INFLUENZA A(!): DETECTED [EM]   1727 Sodium: 136 [EM]      ED Course User Index  [EM] Kev Ordaz MD      PROCEDURES:  None    CONSULTS:  IP CONSULT TO INTERNAL MEDICINE    FINAL IMPRESSION      1.  Shortness of breath          DISPOSITION / PLAN     DISPOSITION        PATIENT REFERRED TO:  [address] [place] [id number] [phone]  Schedule an appointment as soon as possible for a visit   For follow up    [facility] ED  [facility] [address]  [phone]  Go to   As needed      DISCHARGE MEDICATIONS:  New Prescriptions    No medications on file       Kev Ordaz MD  Emergency Medicine Resident    (Please note that portions of thisnote were completed with a voice recognition program.  Efforts were made to edit the dictations but occasionally words are mis-transcribed.)        Kev Ordaz MD  Resident  04/04/22 1728

## 2022-04-04 NOTE — ED TRIAGE NOTES
Mode of arrival (squad #, walk in, police, etc) : squad        Chief complaint(s): SOB        Arrival Note (brief scenario, treatment PTA, etc). : Patient reports SOB that started while at the doctors office today. Patient reported having Covid-19 in October and feeling increased SOB ever since. Patient presented to ED 95% on RA. C= \"Have you ever felt that you should Cut down on your drinking? \"  No  A= \"Have people Annoyed you by criticizing your drinking? \"  No  G= \"Have you ever felt bad or Guilty about your drinking? \"  No  E= \"Have you ever had a drink as an Eye-opener first thing in the morning to steady your nerves or to help a hangover? \"  No      Deferred []      Reason for deferring: N/A    *If yes to two or more: probable alcohol abuse. *

## 2022-04-05 ENCOUNTER — APPOINTMENT (OUTPATIENT)
Dept: NON INVASIVE DIAGNOSTICS | Age: 87
DRG: 177 | End: 2022-04-05
Payer: MEDICARE

## 2022-04-05 PROBLEM — R07.9 CHEST PAIN: Status: ACTIVE | Noted: 2022-04-05

## 2022-04-05 LAB
ABSOLUTE EOS #: 0 K/UL (ref 0–0.4)
ABSOLUTE LYMPH #: 1.2 K/UL (ref 1–4.8)
ABSOLUTE MONO #: 0.2 K/UL (ref 0.1–1.3)
ALBUMIN SERPL-MCNC: 3.2 G/DL (ref 3.5–5.2)
ALP BLD-CCNC: 51 U/L (ref 35–104)
ALT SERPL-CCNC: 20 U/L (ref 5–33)
ANION GAP SERPL CALCULATED.3IONS-SCNC: 14 MMOL/L (ref 9–17)
AST SERPL-CCNC: 28 U/L
BASOPHILS # BLD: 0 % (ref 0–2)
BASOPHILS ABSOLUTE: 0 K/UL (ref 0–0.2)
BILIRUB SERPL-MCNC: 0.23 MG/DL (ref 0.3–1.2)
BUN BLDV-MCNC: 30 MG/DL (ref 8–23)
CALCIUM SERPL-MCNC: 8.5 MG/DL (ref 8.6–10.4)
CHLORIDE BLD-SCNC: 100 MMOL/L (ref 98–107)
CO2: 20 MMOL/L (ref 20–31)
CREAT SERPL-MCNC: 1.39 MG/DL (ref 0.5–0.9)
EKG ATRIAL RATE: 90 BPM
EKG P AXIS: 14 DEGREES
EKG P-R INTERVAL: 138 MS
EKG Q-T INTERVAL: 412 MS
EKG QRS DURATION: 132 MS
EKG QTC CALCULATION (BAZETT): 504 MS
EKG R AXIS: -46 DEGREES
EKG T AXIS: 126 DEGREES
EKG VENTRICULAR RATE: 90 BPM
EOSINOPHILS RELATIVE PERCENT: 0 % (ref 0–4)
GFR AFRICAN AMERICAN: 43 ML/MIN
GFR NON-AFRICAN AMERICAN: 36 ML/MIN
GFR SERPL CREATININE-BSD FRML MDRD: ABNORMAL ML/MIN/{1.73_M2}
GLUCOSE BLD-MCNC: 175 MG/DL (ref 70–99)
HCT VFR BLD CALC: 37.2 % (ref 36–46)
HEMOGLOBIN: 12.7 G/DL (ref 12–16)
LV EF: 38 %
LVEF MODALITY: NORMAL
LYMPHOCYTES # BLD: 20 % (ref 24–44)
MCH RBC QN AUTO: 36.1 PG (ref 26–34)
MCHC RBC AUTO-ENTMCNC: 34 G/DL (ref 31–37)
MCV RBC AUTO: 106.1 FL (ref 80–100)
MONOCYTES # BLD: 3 % (ref 1–7)
PDW BLD-RTO: 13.3 % (ref 11.5–14.9)
PLATELET # BLD: 140 K/UL (ref 150–450)
PMV BLD AUTO: 9.6 FL (ref 6–12)
POTASSIUM SERPL-SCNC: 4.9 MMOL/L (ref 3.7–5.3)
RBC # BLD: 3.51 M/UL (ref 4–5.2)
SEG NEUTROPHILS: 77 % (ref 36–66)
SEGMENTED NEUTROPHILS ABSOLUTE COUNT: 4.8 K/UL (ref 1.3–9.1)
SODIUM BLD-SCNC: 134 MMOL/L (ref 135–144)
TOTAL PROTEIN: 6.3 G/DL (ref 6.4–8.3)
WBC # BLD: 6.2 K/UL (ref 3.5–11)

## 2022-04-05 PROCEDURE — 6370000000 HC RX 637 (ALT 250 FOR IP): Performed by: FAMILY MEDICINE

## 2022-04-05 PROCEDURE — 2580000003 HC RX 258: Performed by: FAMILY MEDICINE

## 2022-04-05 PROCEDURE — 6360000002 HC RX W HCPCS: Performed by: FAMILY MEDICINE

## 2022-04-05 PROCEDURE — 93306 TTE W/DOPPLER COMPLETE: CPT

## 2022-04-05 PROCEDURE — 93010 ELECTROCARDIOGRAM REPORT: CPT | Performed by: INTERNAL MEDICINE

## 2022-04-05 PROCEDURE — 80053 COMPREHEN METABOLIC PANEL: CPT

## 2022-04-05 PROCEDURE — 97162 PT EVAL MOD COMPLEX 30 MIN: CPT

## 2022-04-05 PROCEDURE — 36415 COLL VENOUS BLD VENIPUNCTURE: CPT

## 2022-04-05 PROCEDURE — G0378 HOSPITAL OBSERVATION PER HR: HCPCS

## 2022-04-05 PROCEDURE — 97166 OT EVAL MOD COMPLEX 45 MIN: CPT

## 2022-04-05 PROCEDURE — 87641 MR-STAPH DNA AMP PROBE: CPT

## 2022-04-05 PROCEDURE — 85025 COMPLETE CBC W/AUTO DIFF WBC: CPT

## 2022-04-05 PROCEDURE — 96372 THER/PROPH/DIAG INJ SC/IM: CPT

## 2022-04-05 PROCEDURE — 96366 THER/PROPH/DIAG IV INF ADDON: CPT

## 2022-04-05 RX ORDER — BENZONATATE 100 MG/1
100 CAPSULE ORAL 3 TIMES DAILY PRN
Status: DISCONTINUED | OUTPATIENT
Start: 2022-04-05 | End: 2022-04-08 | Stop reason: HOSPADM

## 2022-04-05 RX ORDER — DEXTROMETHORPHAN POLISTIREX 30 MG/5ML
30 SUSPENSION ORAL EVERY 12 HOURS SCHEDULED
Status: DISCONTINUED | OUTPATIENT
Start: 2022-04-05 | End: 2022-04-08 | Stop reason: HOSPADM

## 2022-04-05 RX ORDER — SODIUM CHLORIDE 9 MG/ML
INJECTION, SOLUTION INTRAVENOUS CONTINUOUS
Status: ACTIVE | OUTPATIENT
Start: 2022-04-05 | End: 2022-04-05

## 2022-04-05 RX ORDER — OSELTAMIVIR PHOSPHATE 30 MG/1
30 CAPSULE ORAL DAILY
Status: DISCONTINUED | OUTPATIENT
Start: 2022-04-05 | End: 2022-04-08 | Stop reason: HOSPADM

## 2022-04-05 RX ADMIN — ATORVASTATIN CALCIUM 10 MG: 20 TABLET, FILM COATED ORAL at 07:58

## 2022-04-05 RX ADMIN — CEFTRIAXONE SODIUM 1000 MG: 1 INJECTION, POWDER, FOR SOLUTION INTRAMUSCULAR; INTRAVENOUS at 17:10

## 2022-04-05 RX ADMIN — LEVOTHYROXINE SODIUM 125 MCG: 0.12 TABLET ORAL at 05:41

## 2022-04-05 RX ADMIN — METOPROLOL SUCCINATE 25 MG: 25 TABLET, EXTENDED RELEASE ORAL at 07:58

## 2022-04-05 RX ADMIN — Medication 4.5 MG: at 20:32

## 2022-04-05 RX ADMIN — SODIUM CHLORIDE: 9 INJECTION, SOLUTION INTRAVENOUS at 07:57

## 2022-04-05 RX ADMIN — ACETAMINOPHEN 650 MG: 325 TABLET ORAL at 13:27

## 2022-04-05 RX ADMIN — BENZONATATE 100 MG: 100 CAPSULE ORAL at 20:32

## 2022-04-05 RX ADMIN — ACETAMINOPHEN 650 MG: 325 TABLET ORAL at 00:06

## 2022-04-05 RX ADMIN — ENOXAPARIN SODIUM 30 MG: 100 INJECTION SUBCUTANEOUS at 07:58

## 2022-04-05 RX ADMIN — DOXYCYCLINE 100 MG: 100 CAPSULE ORAL at 07:58

## 2022-04-05 RX ADMIN — ISOSORBIDE MONONITRATE 60 MG: 60 TABLET, EXTENDED RELEASE ORAL at 07:58

## 2022-04-05 RX ADMIN — Medication 30 MG: at 20:32

## 2022-04-05 RX ADMIN — OSELTAMIVIR PHOSPHATE 30 MG: 30 CAPSULE ORAL at 20:32

## 2022-04-05 RX ADMIN — ALLOPURINOL 300 MG: 300 TABLET ORAL at 07:58

## 2022-04-05 RX ADMIN — DOXYCYCLINE 100 MG: 100 CAPSULE ORAL at 20:32

## 2022-04-05 ASSESSMENT — ENCOUNTER SYMPTOMS
COUGH: 1
NAUSEA: 0
SHORTNESS OF BREATH: 1
EYE REDNESS: 0
CHEST TIGHTNESS: 0
ABDOMINAL PAIN: 0
VOMITING: 0
COLOR CHANGE: 0
EYE ITCHING: 0
BLOOD IN STOOL: 0
TROUBLE SWALLOWING: 0

## 2022-04-05 ASSESSMENT — PAIN SCALES - GENERAL
PAINLEVEL_OUTOF10: 0
PAINLEVEL_OUTOF10: 3
PAINLEVEL_OUTOF10: 4
PAINLEVEL_OUTOF10: 0

## 2022-04-05 ASSESSMENT — PAIN DESCRIPTION - PAIN TYPE: TYPE: ACUTE PAIN

## 2022-04-05 ASSESSMENT — PAIN DESCRIPTION - LOCATION: LOCATION: EAR

## 2022-04-05 ASSESSMENT — PAIN DESCRIPTION - FREQUENCY: FREQUENCY: INTERMITTENT

## 2022-04-05 ASSESSMENT — PAIN DESCRIPTION - DESCRIPTORS: DESCRIPTORS: ACHING

## 2022-04-05 ASSESSMENT — PAIN SCALES - WONG BAKER: WONGBAKER_NUMERICALRESPONSE: 0

## 2022-04-05 ASSESSMENT — PAIN DESCRIPTION - ORIENTATION: ORIENTATION: LEFT

## 2022-04-05 ASSESSMENT — PAIN DESCRIPTION - ONSET: ONSET: ON-GOING

## 2022-04-05 NOTE — ACP (ADVANCE CARE PLANNING)
Advance Care Planning     Advance Care Planning Activator (Inpatient)  Conversation Note      Date of ACP Conversation: 4/5/2022     Conversation Conducted with: Patient with Decision Making Capacity    ACP Activator: Asia Brooks RN        Health Care Decision Maker:     Current Designated Health Care Decision Maker: Nicol Owen    Click here to complete Healthcare Decision Makers including section of the [address] [place] [address] Relationship (ie \"Primary\")  Today we documented Decision Maker(s) consistent with Legal Next of Kin hierarchy. Care Preferences    Ventilation: \"If you were in your present state of health and suddenly became very ill and were unable to breathe on your own, what would your preference be about the use of a ventilator (breathing machine) if it were available to you? \"      Would the patient desire the use of ventilator (breathing machine)?: no    \"If your health worsens and it becomes clear that your chance of recovery is unlikely, what would your preference be about the use of a ventilator (breathing machine) if it were available to you? \"     Would the patient desire the use of ventilator (breathing machine)?: No      Resuscitation  \"CPR works best to restart the heart when there is a sudden event, like a heart attack, in someone who is otherwise healthy. Unfortunately, CPR does not typically restart the heart for people who have serious health conditions or who are very sick. \"    \"In the event your heart stopped as a result of an underlying serious health condition, would you want attempts to be made to restart your heart (answer \"yes\" for attempt to resuscitate) or would you prefer a natural death (answer \"no\" for do not attempt to resuscitate)? \" no       [x] Yes   [] No   Educated Patient / Sahra Waters regarding differences between Advance Directives and portable DNR orders.     Length of ACP Conversation in minutes:      Conversation Outcomes:  [x] ACP discussion completed  [] Existing advance directive reviewed with patient; no changes to patient's previously recorded wishes  [] New Advance Directive completed  [] Portable Do Not Rescitate prepared for Provider review and signature  [] POLST/POST/MOLST/MOST prepared for Provider review and signature      Follow-up plan:    [] Schedule follow-up conversation to continue planning  [] Referred individual to Provider for additional questions/concerns   [] Advised patient/agent/surrogate to review completed ACP document and update if needed with changes in condition, patient preferences or care setting    [x] This note routed to one or more involved healthcare providers

## 2022-04-05 NOTE — DISCHARGE INSTR - COC
Continuity of Care Form    Patient Name: Maribel Bill   :  1930  MRN:  [id number]    Admit date:  2022  Discharge date:  2022    Code Status Order: DNR-CCA   Advance Directives:      Admitting Physician:  Rachel Weiner MD  PCP: Matilda Vazquez    Discharging Nurse: [facility] Unit/Room#: 2116/2116-01  Discharging Unit Phone Number: [phone]    Emergency Contact:   Extended Emergency Contact Information  Primary Emergency Contact: Nay Simental  Mobile Phone: [phone]  Relation: Child  Secondary Emergency Contact: Kimi Saunders Phone: [phone]  Mobile Phone: [phone]  Relation: Child    Past Surgical History:  No past surgical history on file. Immunization History: There is no immunization history on file for this patient.     Active Problems:  Patient Active Problem List   Diagnosis Code    Left lower lobe pneumonia J18.9    Influenza A J10.1    Pneumonia J18.9    Primary hypertension I10    Hypothyroidism E03.9    ASCVD (arteriosclerotic cardiovascular disease) I25.10    Chest pain R07.9       Isolation/Infection:   Isolation            Droplet          Patient Infection Status       Infection Onset Added Last Indicated Last Indicated By Review Planned Expiration Resolved Resolved By    INFLUENZA 22 COVID-19 & Influenza Combo 22      Resolved    COVID-19 (Rule Out) 22 COVID-19 & Influenza Combo (Ordered)   22 Rule-Out Test Resulted            Nurse Assessment:  Last Vital Signs: BP (!) 146/63   Pulse 75   Temp 98.6 °F (37 °C) (Oral)   Resp 16   Ht 5' 5\" (1.651 m)   Wt 150 lb 5.7 oz (68.2 kg)   SpO2 96%   BMI 25.02 kg/m²     Last documented pain score (0-10 scale): Pain Level: 3  Last Weight:   Wt Readings from Last 1 Encounters:   22 150 lb 5.7 oz (68.2 kg)     Mental Status:  oriented and alert    IV Access:  - None    Nursing Mobility/ADLs:  Walking Assisted  Transfer  Assisted  Bathing  Assisted  Dressing  Assisted  Toileting  Assisted  Feeding  Independent  Med Admin  Assisted  Med Delivery   whole    Wound Care Documentation and Therapy:        Elimination:  Continence: Bowel: Yes  Bladder: Yes  Urinary Catheter: None   Colostomy/Ileostomy/Ileal Conduit: No       Date of Last Bm:04/08/2022    Intake/Output Summary (Last 24 hours) at 4/5/2022 1610  Last data filed at 4/5/2022 1231  Gross per 24 hour   Intake 360 ml   Output 600 ml   Net -240 ml     I/O last 3 completed shifts:  In: -   Out: 300 [Urine:300]    Safety Concerns: At Risk for Falls    Impairments/Disabilities:      Vision and Hearing    Nutrition Therapy:  Current Nutrition Therapy:   - Oral Diet:  General    Routes of Feeding: Oral  Liquids: No Restrictions  Daily Fluid Restriction: no  Last Modified Barium Swallow with Video (Video Swallowing Test): not done    Treatments at the Time of Hospital Discharge:   Respiratory Treatments Albuterol  Oxygen Therapy:  is not on home oxygen therapy. Ventilator:    - No ventilator support    Rehab Therapies: Physical Therapy  Weight Bearing Status/Restrictions: No weight bearing restrictions  Other Medical Equipment (for information only, NOT a DME order):  walker  Other Treatments: skilled nursing assessments and medication education and management.      Patient's personal belongings (please select all that are sent with patient):  Glasses    RN SIGNATURE:  Electronically signed by Graciela Brown RN on 4/8/22 at 3:12 PM EDT    CASE MANAGEMENT/SOCIAL WORK SECTION    Inpatient Status Date: ***    Readmission Risk Assessment Score:  Readmission Risk              Risk of Unplanned Readmission:  15           Discharging to Facility/ [address]  [address] [address]  Phone [phone]  Fax  [phone]      Dialysis Facility (if applicable)   Name:  Address:  Dialysis Schedule:  Phone:  Fax:    Case Manager/ signature: {Esignature:[id number]}    PHYSICIAN SECTION    Prognosis: Fair    Condition at Discharge: Stable    Rehab Potential (if transferring to Rehab): Fair    Recommended Labs or Other Treatments After Discharge:    Physician Certification: I certify the above information and transfer of Kunal Dumont  is necessary for the continuing treatment of the diagnosis listed and that she requires Home Care for less 30 days.      Update Admission H&P: No change in H&P    PHYSICIAN SIGNATURE:  Electronically signed by Aimee Bunn MD on 4/8/22 at 7:18 AM EDT

## 2022-04-05 NOTE — PROGRESS NOTES
Physical Therapy    Facility/Department: [facility] AND [facility] PROGRESSIVE CARE  Initial Assessment    NAME: Kip Rojas  : 1930  MRN: [id number]    Date of Service: 2022    Discharge Recommendations:  Patient would benefit from continued therapy after discharge   PT Equipment Recommendations  Equipment Needed: Yes  Mobility Devices: Jing Serene: Rolling  Other: Pt would benefit from RW due to limited endurance and stability in gait - pt feels more secure and presents wtih more safer gait for home with RW. Assessment   Body structures, Functions, Activity limitations: Decreased ADL status; Decreased functional mobility ; Decreased ROM; Decreased strength;Decreased balance;Decreased endurance  Assessment: Pt most limited by endurance but doing well amb wtih RW. Pt would benefit from continued PT and use of RW upon d/c. Treatment Diagnosis: Impaired functional mobility 2* shortness of breath  Specific instructions for Next Treatment: progress gait, stairs, RW training, HEP  Prognosis: Good  Decision Making: Medium Complexity  Exam: ROM, MMT, bed mobility, transfers, amb, balance  Clinical Presentation: Pt alert, cooperative, pleasant  Barriers to Learning: none  REQUIRES PT FOLLOW UP: Yes  Activity Tolerance  Activity Tolerance: Patient Tolerated treatment well;Patient limited by endurance       Patient Diagnosis(es): The encounter diagnosis was Shortness of breath. has no past medical history on file. has no past surgical history on file.     Restrictions  Restrictions/Precautions  Restrictions/Precautions: General Precautions,Fall Risk,Contact Precautions (DROPLET)  Required Braces or Orthoses?: No  Implants present? : Metal implants (sternal wires)  Position Activity Restriction  Other position/activity restrictions: up as tolerated  Vision/Hearing  Vision: Impaired  Vision Exceptions: Wears glasses at all times  Hearing: Within functional limits     Subjective  General  Chart Reviewed: Yes  Patient assessed for rehabilitation services?: Yes  Additional Pertinent Hx: Influenza  Family / Caregiver Present: No  Referring Practitioner: Sheree Raines MD  Referral Date : 04/04/22  Diagnosis: shortness of breath  Follows Commands: Within Functional Limits  Subjective  Subjective: Pt in bed, agreeable to PT OT. Pt wants to get to restroom. Pt on 2L O2. Pain Screening  Patient Currently in Pain: Denies  Vital Signs  Patient Currently in Pain: Denies  Oxygen Therapy  SpO2: 96 %  O2 Device: Nasal cannula  O2 Flow Rate (L/min): 2 L/min       Orientation  Orientation  Overall Orientation Status: Within Normal Limits  Social/Functional History  Social/Functional History  Lives With: Alone  Type of Home: House  Home Layout: One level  Home Access: Stairs to enter with rails  Entrance Stairs - Number of Steps: 2  Entrance Stairs - Rails: Both (can reach both)  Bathroom Shower/Tub: Tub/Shower unit  Bathroom Toilet: Standard  Bathroom Equipment: Shower chair,Hand-held shower  Bathroom Accessibility: Walker accessible  Home Equipment:  (no DME)  ADL Assistance: Independent  Homemaking Assistance: Independent  Homemaking Responsibilities: Yes  Ambulation Assistance: Independent (no DME)  Transfer Assistance: Independent  Active : Yes  Mode of Transportation: Car  Occupation: Retired  Type of occupation: housewife  IADL Comments: sleeps in flat bed  Additional Comments: No recent PT OT. Neighbors check in on patient and Son/daughter both retired and can check in on patient prn.   Cognition        Objective          AROM RLE (degrees)  RLE AROM: WNL  AROM LLE (degrees)  LLE AROM : WNL  AROM RUE (degrees)  RUE General AROM: See OT  AROM LUE (degrees)  LUE General AROM: See OT  Strength RLE  Strength RLE: WNL  Comment: Grossly 4 to 4-/5  Strength LLE  Strength LLE: WNL  Comment: Grossly 4 to 4-/5  Strength RUE  Comment: See OT  Strength LUE  Comment: See OT     Sensation  Overall Sensation Status: WFL (pt denies)  Bed mobility  Rolling to Left: Stand by assistance  Supine to Sit: Stand by assistance  Sit to Supine: Unable to assess  Scooting: Stand by assistance  Comment: Head of bed elevated. No dizziness reported. Pt up in chair end of session. Transfers  Sit to Stand: Contact guard assistance  Stand to sit: Contact guard assistance  Bed to Chair: Contact guard assistance  Stand Pivot Transfers: Contact guard assistance  Comment: CGA from bed and toilet, cues for technique with RW. Ambulation  Ambulation?: Yes  Ambulation 1  Surface: level tile  Device: Rolling Walker  Other Apparatus: O2 (2L)  Assistance: Contact guard assistance  Quality of Gait: normal nemo, no LOB, steady  Distance: 10', 25'  Comments: Good RW management. O2 sats 96% on 2L post amb. Stairs/Curb  Stairs?: No     Balance  Posture: Good  Sitting - Static: Good  Sitting - Dynamic: Good  Standing - Static: Good;-  Standing - Dynamic: Good;-  Comments: Standing balance with RW. Pt feels more secure with RW for safety at home. Plan   Plan  Times per week: 5-6x/week  Specific instructions for Next Treatment: progress gait, stairs, RW training, HEP  Current Treatment Recommendations: Strengthening,Balance Training,Functional Mobility Training,Transfer Training,Endurance Training,Gait Training,Stair training,Equipment Evaluation, Education, & procurement,Patient/Caregiver Education & Training,Safety Education & Training,Home Exercise Program  Safety Devices  Type of devices:  All fall risk precautions in place,Call light within reach,Gait belt,Patient at risk for falls,Left in chair,Nurse notified (BUZZ Dominguez)    G-Code       OutComes Score                                                  AM-PAC Score  AM-PAC Inpatient Mobility Raw Score : 16 (04/05/22 1515)  AM-PAC Inpatient T-Scale Score : 40.78 (04/05/22 1515)  Mobility Inpatient CMS 0-100% Score: 54.16 (04/05/22 1515)  Mobility Inpatient CMS G-Code Modifier : CK (04/05/22 1515)          Goals  Short term goals  Time Frame for Short term goals: 3-4 days  Short term goal 1: pt to demo IND bed mobility. Short term goal 2: Pt to demo MOD I transfers with good technique using RW. Short term goal 3: Pt to amb 100' with device, MOD I. Short term goal 4: Pt to ascend/descend 2 stairs 1-2 UE support, supervision. Short term goal 5: Pt to demo goood technique for HEP for endurance, balance, and strengthening. Patient Goals   Patient goals :  To go home       Therapy Time   Individual Concurrent Group Co-treatment   Time In 1510         Time Out 1525         Minutes [facility] [place], [place]

## 2022-04-05 NOTE — CARE COORDINATION
Michelle Teee U. 12. Encounter Date/Time: 2022 Renee Account: [de-identified]    MRN: [id number]    Patient: Edison Cash    Contact Serial #: [id number]      ENCOUNTER          Patient Class: Observation Private Enc? No Unit RM BD: [facility] PRO    Hospital Service: MED   Encounter DX: Shortness of breath [R06*   ADM Provider: Terri Snow MD   Procedure:     ATT Provider: Terri Snow MD   REF Provider:        Admission DX: Shortness of breath, Pneumonia, Chest pain and DX codes: R06.02, J18.9, R07.9      PATIENT                 Name: Edison Cash : 1930 (89 yrs)   Address: [address]  Sex: Female   City: [place] [address]         Marital Status:    Employer: NO DATA         Alevism: Religious   Primary Care Provider: Mo Cantrell         Primary Phone: [phone]   EMERGENCY CONTACT   Contact Name Legal Guardian? Relationship to Patient Home Phone Work Phone   1. Lucas Connelly  2. Earlmehreen Nicholas      Child  Child    [phone]              GUARANTOR            Guarantor: Edison Cash     : 1930   Address: [address]  Sex: Female     [place] [address]     Relation to Patient: Self       Home Phone: [phone]   Guarantor ID: [id number]       Work Phone:     Guarantor Employer: NO DATA         Status: RETIRED      COVERAGE        PRIMARY INSURANCE   Payor: [facility] MEDICARE [facility]   Payor Address: [address] [address]       Group Number: [id number] Type: [id number] Name: Gisselle Rousseaucci : 1930   Subscriber ID: [id number] Lon Emmanuel. Rel. to Sub: Self   SECONDARY INSURANCE   Payor:   Plan:     Payor Address:  ,           Group Number:   Insurance Type:     Subscriber Name:   Subscriber :     Subscriber ID:   Pat.  Rel. to Sub:             CSN: [id number]        [id number]        CSN                                    Req/Control # Daniel Bradshaw retrieving Specimen ID]                                   Order Date:  2022  [id number]                                          Patient Information      Name:  Ruth Kay  :  1930  Age:  80 y.o. Address:  [address] [place] [address] [address]   Zip:  [address]  PCP: Polly Gunn Sex:  F  SSN: [id number]  Home Phone: [phone]  Work Phone:    Patient MRN:  [id number]    Alt Patient ID:  [id number]  PCP Phone: None       Authorizing Provider Information       AUTHORIZING PROVIDER: Bertram Angulo MD  Physician ID: [id number]  NPI:  [id number]  Site:   Address: [address] [place] [address],[address] Mireles Suzy Doom, [address]  Phone: [phone]  Fax: [phone]                EQUIPMENT ORDERED  DME Order for Jamaica Guerra as OP [employer] (ORD   #:   [id number]) Priority  Routine Class  Hospital Performed        Associated Diagnosis:  Shortness of breath (R06.02 [ICD-10-CM]); Chest pain, unspecified type (R07.9 [ICD-10-CM]); ASCVD (arteriosclerotic cardiovascular disease) (I25.10 [ICD-10-CM]); Primary hypertension (I10 [ICD-10-CM]); Influenza A (J10.1 [ICD-10-CM])        Comments:    You must complete the order parameters below and add the medical necessity documentation for this DME in a separate note.     Folding Walker with Wheels     Current patient weight: Weight: 150 lb 5.7 oz (68.2 kg)  Current patient height: Height: 5' 5\" (165.1 cm)  Diagnosis: debility  Duration: Purchase            Scheduling Instructions:                                 Specimen Source             Collection Date    Collection Time    Order Status    Expected Date                  Electronically Signed By  Bertram Angulo MD Date  2022  4:32 PM         Responsible Party Kelly Castelan     Guar-ActID   Relationship Account Type Home Phone   [address] [place] [address]* [address] [address]  / Maya Elvin, [address] Self P/F [phone]   Employer   Work Phone   NO DATA              [address] [facility] Information     Primary Insurance  Insurance/Subscriber ID:  [id number]  Subscriber Name:  Johny oLpez              Relationship to Patient: SelfSigned ABN: N    Payor Name:  Annette Daniels   Plan:  LINDSAYANSHUL RADHA MEDICARE REPLACEMENT   Group: [id number]  Worker's Comp Date of Injury:                            Santhosh Flores PT   Physical Therapist   Physical Therapy   Progress Notes       Signed   Date of Service:  2022  3:46 PM               Tagged           Signed              Show:Clear all  []Manual[x]Template[]Copied    Added by:  Robert Stephenson, PT      []Kyle for details    Physical Therapy     Facility/Department: [facility] CARE  Initial Assessment     NAME: Tootie Amezquita  : 1930  MRN: [id number]     Date of Service: 2022     Discharge Recommendations:  Patient would benefit from continued therapy after discharge   PT Equipment Recommendations  Equipment Needed: Yes  Mobility Devices: Mariah Beer: Rolling  Other: Pt would benefit from RW due to limited endurance and stability in gait - pt feels more secure and presents wtih more safer gait for home with RW.     Assessment   Body structures, Functions, Activity limitations: Decreased ADL status; Decreased functional mobility ; Decreased ROM; Decreased strength;Decreased balance;Decreased endurance  Assessment: Pt most limited by endurance but doing well amb wtih RW. Pt would benefit from continued PT and use of RW upon d/c.   Treatment Diagnosis: Impaired functional mobility 2* shortness of breath  Specific instructions for Next Treatment: progress gait, stairs, RW training, HEP  Prognosis: Good  Decision Making: Medium Complexity  Exam: ROM, MMT, bed mobility, transfers, amb, balance  Clinical Presentation: Pt alert, cooperative, pleasant  Barriers to Learning: none  REQUIRES PT FOLLOW UP: Yes  Activity Tolerance  Activity Tolerance: Patient Tolerated treatment well;Patient limited by endurance       Patient Diagnosis(es): The encounter diagnosis was Shortness of breath.      has no past medical history on file. has no past surgical history on file.     Restrictions  Restrictions/Precautions  Restrictions/Precautions: General Precautions,Fall Risk,Contact Precautions (DROPLET)  Required Braces or Orthoses?: No  Implants present? : Metal implants (sternal wires)  Position Activity Restriction  Other position/activity restrictions: up as tolerated  Vision/Hearing  Vision: Impaired  Vision Exceptions: Wears glasses at all times  Hearing: Within functional limits     Subjective  General  Chart Reviewed: Yes  Patient assessed for rehabilitation services?: Yes  Additional Pertinent Hx: Influenza  Family / Caregiver Present: No  Referring Practitioner: Bertram Angulo MD  Referral Date : 04/04/22  Diagnosis: shortness of breath  Follows Commands: Within Functional Limits  Subjective  Subjective: Pt in bed, agreeable to PT OT. Pt wants to get to restroom. Pt on 2L O2.    Pain Screening  Patient Currently in Pain: Denies  Vital Signs  Patient Currently in Pain: Denies  Oxygen Therapy  SpO2: 96 %  O2 Device: Nasal cannula  O2 Flow Rate (L/min): 2 L/min     Orientation  Orientation  Overall Orientation Status: Within Normal Limits  Social/Functional History  Social/Functional History  Lives With: Alone  Type of Home: House  Home Layout: One level  Home Access: Stairs to enter with rails  Entrance Stairs - Number of Steps: 2  Entrance Stairs - Rails: Both (can reach both)  Bathroom Shower/Tub: Tub/Shower unit  Bathroom Toilet: Standard  Bathroom Equipment: Shower chair,Hand-held shower  Bathroom Accessibility: Walker accessible  Home Equipment:  (no DME)  ADL Assistance: Independent  Homemaking Assistance: Independent  Homemaking Responsibilities: Yes  Ambulation Assistance: Independent (no DME)  Transfer Assistance: Independent  Active : Yes  Mode of Transportation: Car  Occupation: Retired  Type of occupation: housewife  IADL Comments: sleeps in flat bed  Additional Comments: No recent PT OT. Neighbors check in on patient and Son/daughter both retired and can check in on patient prn. Cognition      Objective     AROM RLE (degrees)  RLE AROM: WNL  AROM LLE (degrees)  LLE AROM : WNL  AROM RUE (degrees)  RUE General AROM: See OT  AROM LUE (degrees)  LUE General AROM: See OT  Strength RLE  Strength RLE: WNL  Comment: Grossly 4 to 4-/5  Strength LLE  Strength LLE: WNL  Comment: Grossly 4 to 4-/5  Strength RUE  Comment: See OT  Strength LUE  Comment: See OT  Sensation  Overall Sensation Status: WFL (pt denies)  Bed mobility  Rolling to Left: Stand by assistance  Supine to Sit: Stand by assistance  Sit to Supine: Unable to assess  Scooting: Stand by assistance  Comment: Head of bed elevated. No dizziness reported. Pt up in chair end of session. Transfers  Sit to Stand: Contact guard assistance  Stand to sit: Contact guard assistance  Bed to Chair: Contact guard assistance  Stand Pivot Transfers: Contact guard assistance  Comment: CGA from bed and toilet, cues for technique with RW. Ambulation  Ambulation?: Yes  Ambulation 1  Surface: level tile  Device: Rolling Walker  Other Apparatus: O2 (2L)  Assistance: Contact guard assistance  Quality of Gait: normal nemo, no LOB, steady  Distance: 10', 25'  Comments: Good RW management. O2 sats 96% on 2L post amb. Stairs/Curb  Stairs?: No  Balance  Posture: Good  Sitting - Static: Good  Sitting - Dynamic: Good  Standing - Static: Good;-  Standing - Dynamic: Good;-  Comments: Standing balance with RW.  Pt feels more secure with RW for safety at home.         Plan   Plan  Times per week: 5-6x/week  Specific instructions for Next Treatment: progress gait, stairs, RW training, HEP  Current Treatment Recommendations: Strengthening,Balance Training,Functional Mobility Training,Transfer Training,Endurance Training,Gait Training,Stair training,Equipment Evaluation, Education, & procurement,Patient/Caregiver Education & Training,Safety Education & Training,Home Exercise Program  Safety Devices  Type of devices: All fall risk precautions in place,Call light within reach,Gait belt,Patient at risk for falls,Left in chair,Nurse notified (RN Luh Brizuela)     G-Code     OutComes Score                                                AM-PAC Score  AM-PAC Inpatient Mobility Raw Score : 16 (04/05/22 1515)  AM-PAC Inpatient T-Scale Score : 40.78 (04/05/22 1515)  Mobility Inpatient CMS 0-100% Score: 54.16 (04/05/22 1515)  Mobility Inpatient CMS G-Code Modifier : CK (04/05/22 1515)     Goals  Short term goals  Time Frame for Short term goals: 3-4 days  Short term goal 1: pt to demo IND bed mobility. Short term goal 2: Pt to demo MOD I transfers with good technique using RW. Short term goal 3: Pt to amb 100' with device, MOD I. Short term goal 4: Pt to ascend/descend 2 stairs 1-2 UE support, supervision. Short term goal 5: Pt to demo goood technique for HEP for endurance, balance, and strengthening. Patient Goals   Patient goals :  To go home     Therapy Time    Individual Concurrent Group Co-treatment   Time In 1510      Time Out 1525      Minutes 15         Phuc Fermin, PT

## 2022-04-05 NOTE — PLAN OF CARE
Problem: Falls - Risk of:  Goal: Will remain free from falls  Description: Will remain free from falls  Outcome: Ongoing  Goal: Absence of physical injury  Description: Absence of physical injury  Outcome: Ongoing     Problem:  Activity:  Goal: Energy level will increase  Description: Energy level will increase  Outcome: Ongoing  Goal: Ability to return to normal activity level will improve  Description: Ability to return to normal activity level will improve  Outcome: Ongoing     Problem: Fluid Volume:  Goal: Maintenance of adequate hydration will improve  Description: Maintenance of adequate hydration will improve  Outcome: Ongoing     Problem: Health Behavior:  Goal: Adoption of positive health habits will improve  Description: Adoption of positive health habits will improve  Outcome: Ongoing  Goal: Compliance with immunization standards will improve  Description: Compliance with immunization standards will improve  Outcome: Ongoing     Problem: Physical Regulation:  Goal: Complications related to the disease process, condition or treatment will be avoided or minimized  Description: Complications related to the disease process, condition or treatment will be avoided or minimized  Outcome: Ongoing  Goal: Ability to maintain clinical measurements within normal limits will improve  Description: Ability to maintain clinical measurements within normal limits will improve  Outcome: Ongoing  Goal: Signs and symptoms of infection will decrease  Description: Signs and symptoms of infection will decrease  Outcome: Ongoing     Problem: Respiratory:  Goal: Respiratory status will improve  Description: Respiratory status will improve  Outcome: Ongoing     Problem: Sensory:  Goal: General experience of comfort will improve  Description: General experience of comfort will improve  Outcome: Ongoing

## 2022-04-05 NOTE — PROGRESS NOTES
[address] [facility]   Occupational Therapy Evaluation  Date: 22  Patient Name: Mariia Garces       Room:   MRN: [id number]  Account: [de-identified]   : 1930  (80 y.o.) Gender: female     Discharge Recommendations:  Further Occupational Therapy is recommended upon facility discharge. Equipment Needed:  (TBD)    Referring Practitioner: Noni Lara MD  Diagnosis: Left lower lobe pneumonia  Additional Pertinent Hx: patient presented to the ER with shortness of breath and coughing with intermittent sputum production for the last 3 days. Patient has mild nonradiating intermittent pleuritic chest pain associated with cough    Treatment Diagnosis: Impaired self care status  Past Medical History:  has no past medical history on file. Past Surgical History:   has no past surgical history on file. Restrictions  Restrictions/Precautions: General Precautions,Fall Risk,Contact Precautions (DROPLET)  Implants present? : Metal implants (sternal wires)  Other position/activity restrictions: up as tolerated  Required Braces or Orthoses?: No     Vitals  Temp: 98.6 °F (37 °C)  Pulse: 75  Resp: 16  BP: (!) 146/63  Height: 5' 5\" (165.1 cm)  Weight: 150 lb 5.7 oz (68.2 kg)  BMI (Calculated): 25.1  Oxygen Therapy  SpO2: 96 %  Pulse Oximeter Device Mode: Intermittent  Pulse Oximeter Device Location: Finger  O2 Device: Nasal cannula  O2 Flow Rate (L/min): 2 L/min  Level of Consciousness: Alert (0)    Subjective  Subjective: \"Oh I don't need that. I've been doing it myself\"  Comments: Okay for OT/PT eval per Chema.  Pt is agreeable for session  Overall Orientation Status: Within Functional Limits  Vision  Vision: Impaired  Vision Exceptions: Wears glasses at all times  Hearing  Hearing: Within functional limits  Social/Functional History  Lives With: Alone  Type of Home: House  Home Layout: One level  Home Access: Stairs to enter with rails  Entrance Stairs - Number of Steps: 2  Entrance Stairs - Rails: Both (can reach both)  Bathroom Shower/Tub: Tub/Shower unit  Bathroom Toilet: Standard  Bathroom Equipment: Shower chair,Hand-held shower  Bathroom Accessibility: Walker accessible  Home Equipment:  (no DME)  ADL Assistance: Independent  Homemaking Assistance: Independent  Homemaking Responsibilities: Yes  Ambulation Assistance: Independent ( no DME)  Transfer Assistance: Independent  Active : Yes  Mode of Transportation: Car  Occupation: Retired  Type of occupation: housewife  IADL Comments: sleeps in flat bed  Additional Comments: No recent PT OT. Neighbors check in on patient and Son/daughter both retired and can check in on patient prn. Objective      Cognition  Overall Cognitive Status: Impaired  Safety Judgement: Decreased awareness of need for safety  Awareness of Errors: Assistance required to identify errors made  Insights: Fully aware of deficits  Sequencing and Organization: Assistance required to identify errors made,Assistance required to generate solutions,Assistance required to implement solutions   Sensation  Overall Sensation Status: WFL (denies)   ADL  Feeding: Setup  Grooming: Setup  UE Bathing: Contact guard assistance  LE Bathing: Minimal assistance  UE Dressing: Contact guard assistance  LE Dressing: Minimal assistance  Toileting: Minimal assistance  Additional Comments: ADL scores based on skilled observation and clinical reasoning, unless otherwise noted. Pt requested to go to the bathroom. One minor LOB with Damon to correct while pt pulled brief down. Pericare completed seated on toilet. Pulled brief over hips, LEs resting against toilet for support.      UE Function           LUE Strength  L Hand General: 4-/5  LUE Strength Comment: Overall 4-/5     LUE Tone: Normotonic     LUE AROM (degrees)  LUE AROM : WFL     Left Hand AROM (degrees)  Left Hand AROM: WFL  RUE Strength  R Hand General: 4-/5  RUE Strength Comment: Overall 4-/5      RUE Tone: Normotonic     RUE AROM (degrees)  RUE AROM : WFL     Right Hand AROM (degrees)  Right Hand AROM: WFL    Fine Motor Skills  Coordination  Movements Are Fluid And Coordinated: Yes                           Mobility  Supine to Sit: Stand by assistance  Sit to Supine: Unable to assess     Balance  Sitting Balance: Supervision  Standing Balance: Minimal assistance (CGA - min A, depending on UE support)  Standing Balance  Time: 1-2 minutes x2  Activity: functional transfers, functional mobility, brief mgmt  Comment: with RW for UE support  Functional Mobility  Functional - Mobility Device: Rolling Walker  Activity: To/from bathroom  Assist Level: Contact guard assistance  Functional Mobility Comments: Assist with line mgmt  Bed mobility  Rolling to Left: Stand by assistance  Supine to Sit: Stand by assistance  Sit to Supine: Unable to assess  Scooting: Stand by assistance  Comment: Head of bed elevated. No dizziness reported. Pt up in chair end of session. Transfers  Sit to stand: Contact guard assistance  Stand to sit: Contact guard assistance  Transfer Comments: Min cues for hand placement for safety  Toilet Transfers  Toilet - Technique: Ambulating  Equipment Used: Grab bars  Toilet Transfer: Minimal assistance  Toilet Transfers Comments: Cued for hand placement for safety  Functional Activity Tolerance  Functional Activity Tolerance:  Tolerates 30 min exercise with multiple rests     Assessment  Assessment  Performance deficits / Impairments: Decreased functional mobility ,Decreased ADL status,Decreased strength,Decreased safe awareness,Decreased endurance,Decreased balance,Decreased high-level IADLs  Treatment Diagnosis: Impaired self care status  Prognosis: Good  Decision Making: Medium Complexity  REQUIRES OT FOLLOW UP: Yes  Discharge Recommendations: Patient would benefit from continued therapy after discharge  Activity Tolerance: Patient Tolerated treatment well         Functional Outcome Measures  AM-PAC Daily Activity Inpatient   How much help for putting on and taking off regular lower body clothing?: A Little  How much help for Bathing?: A Little  How much help for Toileting?: A Little  How much help for putting on and taking off regular upper body clothing?: A Little  How much help for taking care of personal grooming?: A Little  How much help for eating meals?: A Little  AM-[facility] Inpatient Daily Activity Raw Score: 18  AM-PAC Inpatient ADL T-Scale Score : 38.66  ADL Inpatient CMS 0-100% Score: 46.65  ADL Inpatient CMS G-Code Modifier : CK       Goals  Short term goals  Time Frame for Short term goals: By discharge  Short term goal 1: Pt will perform BADLs with mod I and Good safety  Short term goal 2: Pt will perform transfers/functional mobility mod I and Good safety  Short term goal 3: Pt will V/D at least three EC/WS techniques to increase IND in daily routine  Short term goal 4: Pt will perform UE exercises/hand strengthening to increase strength/endurance for improved functional use  Short term goal 5: Pt will actively participate in 15+ minutes of therapeutic exercise/functional activity to promote safety and independence with self care and mobility    Plan  Safety Devices  Safety Devices in place: Yes  Type of devices:  All fall risk precautions in place,Call light within reach,Gait belt,Patient at risk for falls,Left in chair,Nurse notified     Plan  Times per week: 4-5  Current Treatment Recommendations: Strengthening,Balance Training,Functional Mobility Training,Endurance Training,Safety Education & Training,Patient/Caregiver Education & Training,Equipment Evaluation, Education, & procurement,Self-Care / ADL       Equipment Recommendations  Equipment Needed:  (TBD)  OT Individual Minutes  Time In: 1509  Time Out: 632 [facility]  Minutes: 16    Electronically signed by Gabriel Mendiola OT on 4/5/22 at 4:27 PM EDT         04/05/22 1626   OT Individual Minutes   Time In 1509   Time Out 7387   [id number]

## 2022-04-05 NOTE — ED PROVIDER NOTES
[address] [facility]  eMERGENCY dEPARTMENT eNCOUnter   Attending Attestation     Pt Name: Anastasia Zelaya  MRN: [id number]  [place] 7/13/1930  Date of evaluation: 4/5/22    History, EXAM, MDM:    Anastasia Zelaya is a 80 y.o. female who presents with Shortness of Breath  81 yo F presetning with 5 days cough and sob. On exam pt is tachypneic and has rhonchi in the bilateral lower lobes. She is placed on NC O2 with improvement in her RR. She tests positive for influenza A. Pt started on tamiflu. Ceftriaxone and azithromycin in case of secondary bacterial infection. Admitting to hospital given work of breathing. D/w pt she is in agreement with plan. EKG shows a sinus rhythm. HR is 90, , , , LBBB no TITO, No STD, No TWI, the axis leftwards. Vitals:   Vitals:    04/05/22 0044 04/05/22 0046 04/05/22 0306 04/05/22 0644   BP: (!) 149/61  (!) 121/53 (!) 109/57   Pulse: 87  74 73   Resp: 18  18 18   Temp: 101.8 °F (38.8 °C)  98.9 °F (37.2 °C) 98.1 °F (36.7 °C)   TempSrc: Oral  Oral Oral   SpO2: 96%  98% 97%   Weight:  150 lb 5.7 oz (68.2 kg)     Height:         I performed a history and physical examination of the patient and discussed management with the resident. I reviewed the residents note and agree with the documented findings and plan of care. Any areas of disagreement are noted on the chart. I was personally present for the key portions of any procedures. I have documented in the chart those procedures where I was not present during the key portions. I have personally reviewed all images and agree with the resident's interpretation. I have reviewed the emergency nurses triage note. I agree with the chief complaint, past medical history, past surgical history, allergies, medications, social and family history as documented unless otherwise noted below.  Documentation of the HPI, Physical Exam and Medical Decision Making performed by medical students or scribes is based on my personal performance of the HPI, PE and MDM. For Phys Assistant/ Nurse Practitioner cases/documentation I have had a face to face evaluation of this patient and have completed at least one if not all key elements of the E/M (history, physical exam, and MDM). Additional findings are as noted.     Charley Oliva MD  Attending Emergency  Physician                Chraley Oliva MD  04/05/22 8074

## 2022-04-05 NOTE — CARE COORDINATION
DISCHARGE PLANNING NOTE:      Writer notified patient of PT recommendation for in home PT. Patient agreeable, says she has had [address] in the past. Joan Mcpherson [phone], called and verified. They notified Belindasandrine Saunders that they have had patient in the past and referral is sent through [facility]. Writer will fax referral and notify Andrea Rogers. DME order for Walker placed, faxed order, face sheet and thearpy notes to [facility] requesting to be delivered to room.     Electronically signed by Samir Lopez RN on 4/5/2022 at 4:05 PM

## 2022-04-05 NOTE — PROGRESS NOTES
Patient transferred into room 2116 via wheelchair. Patient is in stable condition and all belongings are with patient. Oriented patient to room/unit and prescribed activities while on PCU. Falls protocol reviewed and function of call light reviewed with patient and patient verbalized understanding.

## 2022-04-05 NOTE — CARE COORDINATION
CASE MANAGEMENT NOTE:    Admission Date:  4/4/2022 Angel Patino is a 80 y.o.  female    Admitted for : Shortness of breath [R06.02]  Pneumonia [J18.9]  Chest pain [R07.9]    Met with:  Patient    PCP:  Addison Petersen                                Insurance:  [employer]      Is patient alert and oriented at time of discussion:  Yes    Current Residence/ Living Arrangements:  independently at home             Current Services PTA:  No    Does patient go to outpatient dialysis: No  If yes, location and chair time:     Is patient agreeable to VNS: No    Freedom of choice provided:  Yes    List of [address] provided: No    VNS chosen:  No    DME:  shower chair    Home Oxygen: No    Nebulizer: No    CPAP/BIPAP: No    Supplier: N/A    Potential Assistance Needed: No    SNF needed: No    Freedom of choice and list provided: Yes    Pharmacy:  [facility]       Does Patient want to use MEDS to BEDS? No    Is patient currently receiving oral anticoagulation therapy? No    Is the Patient an [facility] [place] with Readmission Risk Score greater than 14%? No  If yes, pt needs a follow up appointment made within 7 days. Family Members/Caregivers that pt would like involved in their care:    Yes    If yes, list name here:  Son Janay Quiroz, Daughter Magdiel Torres    Transportation Provider:  Patient and Family             Discharge Plan:   Pt from one [place] home alone, independent and drives. DME: [place]. Denies need for VNS. On 2L NC SP02 97%, does not wear at home. Influenza A positive. On Tamiflu. IV Rocephin.        Electronically signed by: Rejeana Scheuermann, RN on 4/5/2022 at 9:17 AM

## 2022-04-05 NOTE — H&P
History and Physical      Name: Kinsey Starr  MRN: [id number]     Acct: [de-identified]  Room: [address] [place] [address]-    Admit Date: 4/4/2022  PCP: Denzel Bumpers      Chief Complaint:     Chief Complaint   Patient presents with    Shortness of Breath       History Obtained From:     patient, electronic medical record    History of Present Illness:      Kinsey Starr is a  80 y.o.  female  With hypertension, hypothyroidism, ASCVD presents with Shortness of Breath   patient presented to the ER with shortness of breath and coughing with intermittent sputum production for the last 3 days. Patient has mild nonradiating intermittent pleuritic chest pain associated with cough. Patient states that she had 4 episodes of loose stools for the last 3 days that has resolved now. patient denies lightheadedness visual change, palpitation, sore throat nausea vomiting diaphoresis abdominal pain dysuria fever or chills. Past Medical History:     No past medical history on file. Past Surgical History:     No past surgical history on file. Medications Prior to Admission:       Prior to Admission medications    Medication Sig Start Date End Date Taking?  Authorizing Provider   allopurinol (ZYLOPRIM) 300 MG tablet Take 300 mg by mouth daily   Yes Historical Provider, MD   estradiol (ESTRACE) 0.5 MG tablet Take 0.5 mg by mouth daily   Yes Historical Provider, MD   isosorbide mononitrate (IMDUR) 60 MG extended release tablet Take 60 mg by mouth daily   Yes Historical Provider, MD   levothyroxine (SYNTHROID) 125 MCG tablet Take 125 mcg by mouth Daily   Yes Historical Provider, MD   metoprolol succinate (TOPROL XL) 25 MG extended release tablet Take 25 mg by mouth daily   Yes Historical Provider, MD   triamterene-hydroCHLOROthiazide (MAXZIDE-25) 37.5-25 MG per tablet Take 1 tablet by mouth daily   Yes Historical Provider, MD   atorvastatin (LIPITOR) 10 MG tablet Take 10 mg by mouth daily  Patient not taking: Reported on 4/4/2022 Historical Provider, MD        Allergies:       Codeine    Social History:     Tobacco:    has no history on file for tobacco use. Alcohol:      has no history on file for alcohol use. Drug Use:  has no history on file for drug use. Family History:     No family history on file. Review of Systems:     Positive and Negative as described in HPI   all 10 systems are reviewed and negative except as Noted      Review of Systems   Constitutional: Negative for chills and fatigue. HENT: Negative for drooling, mouth sores, sneezing and trouble swallowing. Eyes: Negative for redness and itching. Respiratory: Positive for cough and shortness of breath. Negative for chest tightness. Cardiovascular: Negative for chest pain, palpitations and leg swelling. Gastrointestinal: Negative for abdominal pain, blood in stool, nausea and vomiting. Endocrine: Negative for heat intolerance and polyphagia. Genitourinary: Negative for difficulty urinating, flank pain and pelvic pain. Musculoskeletal: Negative for arthralgias, joint swelling and neck stiffness. Skin: Negative for color change and pallor. Allergic/Immunologic: Negative for food allergies. Neurological: Negative for dizziness, seizures and headaches. Hematological: Does not bruise/bleed easily. Psychiatric/Behavioral: Negative for agitation, behavioral problems and suicidal ideas. The patient is not hyperactive. Code Status:  DNR-CCA    Physical Exam:     Vitals:  BP (!) 146/63   Pulse 75   Temp 98.6 °F (37 °C) (Oral)   Resp 16   Ht 5' 5\" (1.651 m)   Wt 150 lb 5.7 oz (68.2 kg)   SpO2 99%   BMI 25.02 kg/m²   Temp (24hrs), Av.4 °F (37.4 °C), Min:98.1 °F (36.7 °C), Max:101.8 °F (38.8 °C)        Physical Exam  Vitals reviewed. HENT:      Head: Normocephalic.       Right Ear: External ear normal.      Left Ear: External ear normal.      Nose: Nose normal.      Mouth/Throat:      Mouth: Mucous membranes are moist.      Pharynx: Oropharynx is clear. Eyes:      Conjunctiva/sclera: Conjunctivae normal.   Cardiovascular:      Rate and Rhythm: Normal rate and regular rhythm. Pulses: Normal pulses. Heart sounds: Normal heart sounds. Pulmonary:      Effort: Pulmonary effort is normal.      Breath sounds: Examination of the left-middle field reveals rhonchi. Examination of the left-lower field reveals decreased breath sounds and rhonchi. Decreased breath sounds and rhonchi present. Abdominal:      General: Bowel sounds are normal.      Palpations: Abdomen is soft. Tenderness: There is no abdominal tenderness. There is no right CVA tenderness or left CVA tenderness. Musculoskeletal:         General: No deformity. Cervical back: Normal range of motion and neck supple. Right lower leg: No edema. Left lower leg: No edema. Skin:     General: Skin is warm. Capillary Refill: Capillary refill takes less than 2 seconds. Coloration: Skin is not jaundiced. Neurological:      General: No focal deficit present. Mental Status: She is alert. Mental status is at baseline.    Psychiatric:         Mood and Affect: Mood normal.         Behavior: Behavior normal.               Data:     Recent Results (from the past 24 hour(s))   EKG 12 Lead    Collection Time: 04/04/22  4:30 PM   Result Value Ref Range    Ventricular Rate 90 BPM    Atrial Rate 90 BPM    P-R Interval 138 ms    QRS Duration 132 ms    Q-T Interval 412 ms    QTc Calculation (Bazett) 504 ms    P Axis 14 degrees    R Axis -46 degrees    T Axis 126 degrees   CBC with Auto Differential    Collection Time: 04/04/22  4:40 PM   Result Value Ref Range    WBC 5.6 3.5 - 11.0 k/uL    RBC 4.17 4.0 - 5.2 m/uL    Hemoglobin 15.2 12.0 - 16.0 g/dL    Hematocrit 44.6 36 - 46 %    .0 (H) 80 - 100 fL    MCH 36.4 (H) 26 - 34 pg    MCHC 34.0 31 - 37 g/dL    RDW 13.5 11.5 - 14.9 %    Platelets 145 438 - 637 k/uL    MPV 9.8 6.0 - 12.0 fL    Seg Neutrophils 81 (H) 36 - 66 %    Lymphocytes 14 (L) 24 - 44 %    Monocytes 5 1 - 7 %    Eosinophils % 0 0 - 4 %    Basophils 0 0 - 2 %    Segs Absolute 4.50 1.3 - 9.1 k/uL    Absolute Lymph # 0.80 (L) 1.0 - 4.8 k/uL    Absolute Mono # 0.30 0.1 - 1.3 k/uL    Absolute Eos # 0.00 0.0 - 0.4 k/uL    Basophils Absolute 0.00 0.0 - 0.2 k/uL   Basic Metabolic Panel    Collection Time: 04/04/22  4:40 PM   Result Value Ref Range    Glucose 172 (H) 70 - 99 mg/dL    BUN 30 (H) 8 - 23 mg/dL    CREATININE 1.21 (H) 0.50 - 0.90 mg/dL    Calcium 9.2 8.6 - 10.4 mg/dL    Sodium 136 135 - 144 mmol/L    Potassium 5.7 (H) 3.7 - 5.3 mmol/L    Chloride 100 98 - 107 mmol/L    CO2 19 (L) 20 - 31 mmol/L    Anion Gap 17 9 - 17 mmol/L    GFR Non-African American 42 (L) >60 mL/min    GFR  51 (L) >60 mL/min    GFR Comment         Troponin    Collection Time: 04/04/22  4:40 PM   Result Value Ref Range    Troponin, High Sensitivity 36 (H) 0 - 14 ng/L   Brain Natriuretic Peptide    Collection Time: 04/04/22  4:40 PM   Result Value Ref Range    Pro- (H) <300 pg/mL   COVID-19 & Influenza Combo    Collection Time: 04/04/22  4:40 PM    Specimen: Nasopharyngeal Swab   Result Value Ref Range    Specimen Description . NASOPHARYNGEAL SWAB     Source . NASOPHARYNGEAL SWAB     SARS-CoV-2 RNA, RT PCR Not Detected Not Detected    INFLUENZA A DETECTED (A) Not Detected    INFLUENZA B Not Detected Not Detected   Troponin    Collection Time: 04/04/22  6:30 PM   Result Value Ref Range    Troponin, High Sensitivity 32 (H) 0 - 14 ng/L   Potassium    Collection Time: 04/04/22  6:30 PM   Result Value Ref Range    Potassium 5.1 3.7 - 5.3 mmol/L   POC Glucose Fingerstick    Collection Time: 04/04/22  6:50 PM   Result Value Ref Range    POC Glucose 334 (H) 65 - 105 mg/dL   Comprehensive Metabolic Panel w/ Reflex to MG    Collection Time: 04/05/22  4:31 AM   Result Value Ref Range    Glucose 175 (H) 70 - 99 mg/dL    BUN 30 (H) 8 - 23 mg/dL    CREATININE 1.39 (H) 0.50 - 0.90 mg/dL    Calcium 8.5 (L) 8.6 - 10.4 mg/dL    Sodium 134 (L) 135 - 144 mmol/L    Potassium 4.9 3.7 - 5.3 mmol/L    Chloride 100 98 - 107 mmol/L    CO2 20 20 - 31 mmol/L    Anion Gap 14 9 - 17 mmol/L    Alkaline Phosphatase 51 35 - 104 U/L    ALT 20 5 - 33 U/L    AST 28 <32 U/L    Total Bilirubin 0.23 (L) 0.3 - 1.2 mg/dL    Total Protein 6.3 (L) 6.4 - 8.3 g/dL    Albumin 3.2 (L) 3.5 - 5.2 g/dL    GFR Non- 36 (L) >60 mL/min    GFR  43 (L) >60 mL/min    GFR Comment         CBC auto differential    Collection Time: 04/05/22  4:31 AM   Result Value Ref Range    WBC 6.2 3.5 - 11.0 k/uL    RBC 3.51 (L) 4.0 - 5.2 m/uL    Hemoglobin 12.7 12.0 - 16.0 g/dL    Hematocrit 37.2 36 - 46 %    .1 (H) 80 - 100 fL    MCH 36.1 (H) 26 - 34 pg    MCHC 34.0 31 - 37 g/dL    RDW 13.3 11.5 - 14.9 %    Platelets 762 (L) 321 - 450 k/uL    MPV 9.6 6.0 - 12.0 fL    Seg Neutrophils 77 (H) 36 - 66 %    Lymphocytes 20 (L) 24 - 44 %    Monocytes 3 1 - 7 %    Eosinophils % 0 0 - 4 %    Basophils 0 0 - 2 %    Segs Absolute 4.80 1.3 - 9.1 k/uL    Absolute Lymph # 1.20 1.0 - 4.8 k/uL    Absolute Mono # 0.20 0.1 - 1.3 k/uL    Absolute Eos # 0.00 0.0 - 0.4 k/uL    Basophils Absolute 0.00 0.0 - 0.2 k/uL       Assesment:     Primary Problem  Left lower lobe pneumonia    Principal Problem:    Left lower lobe pneumonia  Active Problems:    Influenza A    Primary hypertension    Hypothyroidism    ASCVD (arteriosclerotic cardiovascular disease)    Chest pain  Resolved Problems:    * No resolved hospital problems. *      Plan:     1. IV Rocephin 1 g daily  2. IV normal saline at 50 mL/hour  3.  doxycycline 100 mg p.o. twice daily  4. MRSA nasal swab  5. Tessalon Perles 100 mg p.o. every 8 hours as needed for cough  6. Delsym 30 mg p.o. twice daily  7. Tamiflu 30 mg p.o. daily  8.  check 2D echo  9.  x-ray report shows left lower lobe pneumonia  10.  12 lead EKG shows normal sinus rhythm  11.   DuoNeb nebulizer treatment every 4 hours as needed for shortness of breath  12. DVT prophylaxis Lovenox 30 mg subQ daily  13. EPCs  14.  check and replace electrolytes per sliding scale  15.   restart home medications        Electronically signed by Leydi Cervantes MD     Copy sent to Dr. Matilda Guerrero

## 2022-04-06 LAB
ABSOLUTE EOS #: 0 K/UL (ref 0–0.4)
ABSOLUTE LYMPH #: 1.8 K/UL (ref 1–4.8)
ABSOLUTE MONO #: 0.1 K/UL (ref 0.1–1.3)
ALBUMIN SERPL-MCNC: 3 G/DL (ref 3.5–5.2)
ALP BLD-CCNC: 52 U/L (ref 35–104)
ALT SERPL-CCNC: 18 U/L (ref 5–33)
ANION GAP SERPL CALCULATED.3IONS-SCNC: 12 MMOL/L (ref 9–17)
AST SERPL-CCNC: 22 U/L
BASOPHILS # BLD: 0 % (ref 0–2)
BASOPHILS ABSOLUTE: 0 K/UL (ref 0–0.2)
BILIRUB SERPL-MCNC: 0.2 MG/DL (ref 0.3–1.2)
BUN BLDV-MCNC: 34 MG/DL (ref 8–23)
CALCIUM SERPL-MCNC: 8.3 MG/DL (ref 8.6–10.4)
CHLORIDE BLD-SCNC: 103 MMOL/L (ref 98–107)
CO2: 19 MMOL/L (ref 20–31)
CREAT SERPL-MCNC: 1.22 MG/DL (ref 0.5–0.9)
EOSINOPHILS RELATIVE PERCENT: 1 % (ref 0–4)
GFR AFRICAN AMERICAN: 50 ML/MIN
GFR NON-AFRICAN AMERICAN: 41 ML/MIN
GFR SERPL CREATININE-BSD FRML MDRD: ABNORMAL ML/MIN/{1.73_M2}
GLUCOSE BLD-MCNC: 133 MG/DL (ref 70–99)
HCT VFR BLD CALC: 36 % (ref 36–46)
HEMOGLOBIN: 12.3 G/DL (ref 12–16)
LYMPHOCYTES # BLD: 42 % (ref 24–44)
MCH RBC QN AUTO: 36.2 PG (ref 26–34)
MCHC RBC AUTO-ENTMCNC: 34.1 G/DL (ref 31–37)
MCV RBC AUTO: 106.4 FL (ref 80–100)
MONOCYTES # BLD: 4 % (ref 1–7)
MRSA, DNA, NASAL: NEGATIVE
PDW BLD-RTO: 13 % (ref 11.5–14.9)
PLATELET # BLD: 131 K/UL (ref 150–450)
PMV BLD AUTO: 10.3 FL (ref 6–12)
POTASSIUM SERPL-SCNC: 4.4 MMOL/L (ref 3.7–5.3)
RBC # BLD: 3.39 M/UL (ref 4–5.2)
SEG NEUTROPHILS: 53 % (ref 36–66)
SEGMENTED NEUTROPHILS ABSOLUTE COUNT: 2.2 K/UL (ref 1.3–9.1)
SODIUM BLD-SCNC: 134 MMOL/L (ref 135–144)
SPECIMEN DESCRIPTION: NORMAL
TOTAL PROTEIN: 6 G/DL (ref 6.4–8.3)
WBC # BLD: 4.2 K/UL (ref 3.5–11)

## 2022-04-06 PROCEDURE — 80053 COMPREHEN METABOLIC PANEL: CPT

## 2022-04-06 PROCEDURE — 2580000003 HC RX 258: Performed by: FAMILY MEDICINE

## 2022-04-06 PROCEDURE — 85025 COMPLETE CBC W/AUTO DIFF WBC: CPT

## 2022-04-06 PROCEDURE — 6360000002 HC RX W HCPCS: Performed by: FAMILY MEDICINE

## 2022-04-06 PROCEDURE — 2060000000 HC ICU INTERMEDIATE R&B

## 2022-04-06 PROCEDURE — 36415 COLL VENOUS BLD VENIPUNCTURE: CPT

## 2022-04-06 PROCEDURE — 2580000003 HC RX 258: Performed by: STUDENT IN AN ORGANIZED HEALTH CARE EDUCATION/TRAINING PROGRAM

## 2022-04-06 PROCEDURE — 96372 THER/PROPH/DIAG INJ SC/IM: CPT

## 2022-04-06 PROCEDURE — 6370000000 HC RX 637 (ALT 250 FOR IP): Performed by: FAMILY MEDICINE

## 2022-04-06 RX ORDER — GUAIFENESIN 600 MG/1
600 TABLET, EXTENDED RELEASE ORAL 2 TIMES DAILY
Status: DISCONTINUED | OUTPATIENT
Start: 2022-04-06 | End: 2022-04-08 | Stop reason: HOSPADM

## 2022-04-06 RX ADMIN — OSELTAMIVIR PHOSPHATE 30 MG: 30 CAPSULE ORAL at 16:39

## 2022-04-06 RX ADMIN — ENOXAPARIN SODIUM 30 MG: 100 INJECTION SUBCUTANEOUS at 09:48

## 2022-04-06 RX ADMIN — METOPROLOL SUCCINATE 25 MG: 25 TABLET, EXTENDED RELEASE ORAL at 09:48

## 2022-04-06 RX ADMIN — Medication 30 MG: at 21:41

## 2022-04-06 RX ADMIN — GUAIFENESIN 600 MG: 600 TABLET, EXTENDED RELEASE ORAL at 21:41

## 2022-04-06 RX ADMIN — BENZONATATE 100 MG: 100 CAPSULE ORAL at 04:18

## 2022-04-06 RX ADMIN — SODIUM CHLORIDE, PRESERVATIVE FREE 10 ML: 5 INJECTION INTRAVENOUS at 22:24

## 2022-04-06 RX ADMIN — LEVOTHYROXINE SODIUM 125 MCG: 0.12 TABLET ORAL at 05:31

## 2022-04-06 RX ADMIN — DOXYCYCLINE 100 MG: 100 CAPSULE ORAL at 21:41

## 2022-04-06 RX ADMIN — ISOSORBIDE MONONITRATE 60 MG: 60 TABLET, EXTENDED RELEASE ORAL at 09:49

## 2022-04-06 RX ADMIN — DOXYCYCLINE 100 MG: 100 CAPSULE ORAL at 09:49

## 2022-04-06 RX ADMIN — ATORVASTATIN CALCIUM 10 MG: 20 TABLET, FILM COATED ORAL at 09:49

## 2022-04-06 RX ADMIN — ALLOPURINOL 300 MG: 300 TABLET ORAL at 09:49

## 2022-04-06 RX ADMIN — Medication 10 ML: at 22:23

## 2022-04-06 RX ADMIN — CEFTRIAXONE SODIUM 1000 MG: 1 INJECTION, POWDER, FOR SOLUTION INTRAMUSCULAR; INTRAVENOUS at 16:49

## 2022-04-06 RX ADMIN — Medication 30 MG: at 09:49

## 2022-04-06 RX ADMIN — BENZONATATE 100 MG: 100 CAPSULE ORAL at 16:39

## 2022-04-06 ASSESSMENT — ENCOUNTER SYMPTOMS
BLOOD IN STOOL: 0
ABDOMINAL PAIN: 0
CHEST TIGHTNESS: 0
VOMITING: 0
EYE ITCHING: 0
TROUBLE SWALLOWING: 0
SHORTNESS OF BREATH: 1
COLOR CHANGE: 0
COUGH: 1
EYE REDNESS: 0
NAUSEA: 0

## 2022-04-06 ASSESSMENT — PAIN SCALES - WONG BAKER
WONGBAKER_NUMERICALRESPONSE: 0

## 2022-04-06 ASSESSMENT — PAIN SCALES - GENERAL
PAINLEVEL_OUTOF10: 0

## 2022-04-06 NOTE — PLAN OF CARE
Problem: Falls - Risk of:  Goal: Will remain free from falls  Description: Will remain free from falls  Outcome: Ongoing     Problem:  Activity:  Goal: Energy level will increase  Description: Energy level will increase  Outcome: Ongoing     Problem: Fluid Volume:  Goal: Maintenance of adequate hydration will improve  Description: Maintenance of adequate hydration will improve  Outcome: Ongoing

## 2022-04-06 NOTE — PROGRESS NOTES
Progress Note    4/6/2022   1:28 PM    Name:  Ruth Kay  MRN:    [id number]     Acct:     [de-identified]   Room:  2116/2116-01   Day: 0     Admit Date: 4/4/2022  4:19 PM  PCP: Polly Gunn    Subjective:     C/C:   Chief Complaint   Patient presents with    Shortness of Breath       Interval History: Status: not changed. Patient has a poor appetite. patient has shortness of breath with cough unable to expectorate sputum. Patient oxygen saturation is 96% on room air blood pressure and heart rate stable afebrile. Recent labs reviewed sodium 134 creatinine 1.22, platelet count 293    ROS:   all 10 systems reviewed and are negative except as noted    Review of Systems   Constitutional: Negative for chills and fatigue. HENT: Negative for drooling, mouth sores, sneezing and trouble swallowing. Eyes: Negative for redness and itching. Respiratory: Positive for cough and shortness of breath. Negative for chest tightness. Cardiovascular: Negative for chest pain, palpitations and leg swelling. Gastrointestinal: Negative for abdominal pain, blood in stool, nausea and vomiting. Endocrine: Negative for heat intolerance and polyphagia. Genitourinary: Negative for difficulty urinating, flank pain and pelvic pain. Musculoskeletal: Negative for arthralgias, joint swelling and neck stiffness. Skin: Negative for color change and pallor. Allergic/Immunologic: Negative for food allergies. Neurological: Negative for dizziness, seizures and headaches. Hematological: Does not bruise/bleed easily. Psychiatric/Behavioral: Negative for agitation, behavioral problems and suicidal ideas. The patient is not hyperactive. Medications: Allergies:    Allergies   Allergen Reactions    Codeine        Current Meds: guaiFENesin (MUCINEX) extended release tablet 600 mg, BID  perflutren lipid microspheres (DEFINITY) injection 1.65 mg, ONCE PRN  benzonatate (TESSALON) capsule 100 mg, TID PRN  dextromethorphan (DELSYM) 30 MG/5ML extended release liquid 30 mg, 2 times per day  oseltamivir (TAMIFLU) capsule 30 mg, Daily  sodium chloride flush 0.9 % injection 5-40 mL, 2 times per day  sodium chloride flush 0.9 % injection 5-40 mL, PRN  0.9 % sodium chloride infusion, PRN  enoxaparin (LOVENOX) injection 30 mg, Daily  metoprolol succinate (TOPROL XL) extended release tablet 25 mg, Daily  levothyroxine (SYNTHROID) tablet 125 mcg, Daily  isosorbide mononitrate (IMDUR) extended release tablet 60 mg, Daily  atorvastatin (LIPITOR) tablet 10 mg, Daily  allopurinol (ZYLOPRIM) tablet 300 mg, Daily  sodium chloride flush 0.9 % injection 10 mL, 2 times per day  sodium chloride flush 0.9 % injection 10 mL, PRN  0.9 % sodium chloride infusion, PRN  ondansetron (ZOFRAN-ODT) disintegrating tablet 4 mg, Q8H PRN   Or  ondansetron (ZOFRAN) injection 4 mg, Q6H PRN  magnesium hydroxide (MILK OF MAGNESIA) 400 MG/5ML suspension 30 mL, Daily PRN  acetaminophen (TYLENOL) tablet 650 mg, Q6H PRN   Or  acetaminophen (TYLENOL) suppository 650 mg, Q6H PRN  cefTRIAXone (ROCEPHIN) 1000 mg IVPB in 50 mL D5W minibag, Q24H  doxycycline monohydrate (MONODOX) capsule 100 mg, 2 times per day  ipratropium-albuterol (DUONEB) nebulizer solution 1 ampule, Q4H PRN  melatonin tablet 4.5 mg, Nightly PRN        Data:     Code Status:  DNR-CCA    No family history on file. Social History     Socioeconomic History    Marital status:       Spouse name: Not on file    Number of children: Not on file    Years of education: Not on file    Highest education level: Not on file   Occupational History    Not on file   Tobacco Use    Smoking status: Not on file    Smokeless tobacco: Not on file   Substance and Sexual Activity    Alcohol use: Not on file    Drug use: Not on file    Sexual activity: Not on file   Other Topics Concern    Not on file   Social History Narrative    Not on file     Social Determinants of Health     Financial Resource Strain:     Difficulty of Paying Living Expenses: Not on file   Food Insecurity:     Worried About Running Out of Food in the Last Year: Not on file    [place] of Food in the Last Year: Not on file   Transportation Needs:     Lack of Transportation (Medical): Not on file    Lack of Transportation (Non-Medical): Not on file   Physical Activity:     Days of Exercise per Week: Not on file    Minutes of Exercise per Session: Not on file   Stress:     Feeling of Stress : Not on file   Social Connections:     Frequency of Communication with Friends and Family: Not on file    Frequency of Social Gatherings with Friends and Family: Not on file    Attends Baptism Services: Not on file    Active Member of [address] [place] [address] [employer] or Organizations: Not on file    Attends Club or Organization Meetings: Not on file    Marital Status: Not on file   Intimate Partner Violence:     Fear of Current or Ex-Partner: Not on file    Emotionally Abused: Not on file    Physically Abused: Not on file    Sexually Abused: Not on file   Housing Stability:     Unable to Pay for Housing in the Last Year: Not on file    Number of [place] in the Last Year: Not on file    Unstable Housing in the Last Year: Not on file       I/O (24Hr):     Intake/Output Summary (Last 24 hours) at 4/6/2022 1328  Last data filed at 4/6/2022 1305  Gross per 24 hour   Intake 420 ml   Output 610 ml   Net -190 ml     Radiology:  XR CHEST PORTABLE    Result Date: 4/4/2022  Possible left lower lobe pneumonia       Labs:  Recent Results (from the past 24 hour(s))   Comprehensive Metabolic Panel w/ Reflex to MG    Collection Time: 04/06/22  5:13 AM   Result Value Ref Range    Glucose 133 (H) 70 - 99 mg/dL    BUN 34 (H) 8 - 23 mg/dL    CREATININE 1.22 (H) 0.50 - 0.90 mg/dL    Calcium 8.3 (L) 8.6 - 10.4 mg/dL    Sodium 134 (L) 135 - 144 mmol/L    Potassium 4.4 3.7 - 5.3 mmol/L    Chloride 103 98 - 107 mmol/L    CO2 19 (L) 20 - 31 mmol/L    Anion Gap 12 9 - 17 mmol/L    Alkaline Phosphatase 52 35 - 104 U/L    ALT 18 5 - 33 U/L    AST 22 <32 U/L    Total Bilirubin 0.20 (L) 0.3 - 1.2 mg/dL    Total Protein 6.0 (L) 6.4 - 8.3 g/dL    Albumin 3.0 (L) 3.5 - 5.2 g/dL    GFR Non- 41 (L) >60 mL/min    GFR African American 50 (L) >60 mL/min    GFR Comment         CBC auto differential    Collection Time: 22  5:13 AM   Result Value Ref Range    WBC 4.2 3.5 - 11.0 k/uL    RBC 3.39 (L) 4.0 - 5.2 m/uL    Hemoglobin 12.3 12.0 - 16.0 g/dL    Hematocrit 36.0 36 - 46 %    .4 (H) 80 - 100 fL    MCH 36.2 (H) 26 - 34 pg    MCHC 34.1 31 - 37 g/dL    RDW 13.0 11.5 - 14.9 %    Platelets 023 (L) 482 - 450 k/uL    MPV 10.3 6.0 - 12.0 fL    Seg Neutrophils 53 36 - 66 %    Lymphocytes 42 24 - 44 %    Monocytes 4 1 - 7 %    Eosinophils % 1 0 - 4 %    Basophils 0 0 - 2 %    Segs Absolute 2.20 1.3 - 9.1 k/uL    Absolute Lymph # 1.80 1.0 - 4.8 k/uL    Absolute Mono # 0.10 0.1 - 1.3 k/uL    Absolute Eos # 0.00 0.0 - 0.4 k/uL    Basophils Absolute 0.00 0.0 - 0.2 k/uL       Physical Examination:        Vitals:  BP (!) 151/70   Pulse 62   Temp 97.7 °F (36.5 °C) (Oral)   Resp 14   Ht 5' 5\" (1.651 m)   Wt 153 lb 3.5 oz (69.5 kg)   SpO2 96%   BMI 25.50 kg/m²   Temp (24hrs), Av.8 °F (36.6 °C), Min:97.7 °F (36.5 °C), Max:97.9 °F (36.6 °C)    Recent Labs     22  1850   POCGLU 334*         Physical Exam  Vitals reviewed. HENT:      Head: Normocephalic. Right Ear: External ear normal.      Left Ear: External ear normal.      Nose: Nose normal.      Mouth/Throat:      Mouth: Mucous membranes are moist.      Pharynx: Oropharynx is clear. Eyes:      Conjunctiva/sclera: Conjunctivae normal.   Cardiovascular:      Rate and Rhythm: Normal rate and regular rhythm. Pulses: Normal pulses. Heart sounds: Normal heart sounds. Pulmonary:      Effort: Pulmonary effort is normal.      Breath sounds: Examination of the right-lower field reveals rhonchi.  Examination of the left-lower field reveals rhonchi. Rhonchi present. No wheezing. Abdominal:      General: Bowel sounds are normal.      Palpations: Abdomen is soft. Tenderness: There is no abdominal tenderness. There is no right CVA tenderness or left CVA tenderness. Musculoskeletal:         General: No deformity. Cervical back: Normal range of motion and neck supple. Right lower leg: No edema. Left lower leg: No edema. Skin:     General: Skin is warm. Capillary Refill: Capillary refill takes less than 2 seconds. Coloration: Skin is not jaundiced. Neurological:      General: No focal deficit present. Mental Status: She is alert. Mental status is at baseline. Psychiatric:         Mood and Affect: Mood normal.         Behavior: Behavior normal.         Assessment:        Primary Problem  Left lower lobe pneumonia     Principal Problem:    Left lower lobe pneumonia  Active Problems:    Influenza A    Pneumonia    Primary hypertension    Hypothyroidism    ASCVD (arteriosclerotic cardiovascular disease)    Chest pain  Resolved Problems:    * No resolved hospital problems. *      History reviewed. No pertinent past medical history. Plan:        1. IV Rocephin 1 g daily  2. IV normal saline at 50 mL/hour  3.  doxycycline 100 mg p.o. twice daily  4. MRSA nasal swab pending  5. Start Mucinex 600 mg p.o. twice daily  6. Nutritional supplement 3 times a day  7. Midline  8. Tessalon Perles 100 mg p.o. every 8 hours as needed for cough  9. Delsym 30 mg p.o. twice daily  10. Tamiflu 30 mg p.o. daily  11.  check 2D echo report shows an ejection fraction of 40 %  12.  x-ray report shows left lower lobe pneumonia  13.  12 lead EKG shows normal sinus rhythm  14. DuoNeb nebulizer treatment every 4 hours as needed for shortness of breath  1. Replace electrolytes as per sliding scale  2. Home medications reviewed and appropriate medications continued  3.  Reviewed labs and imaging studies from last 24 hours and results explained to patient      Electronically signed by Alina Harvey MD

## 2022-04-06 NOTE — CARE COORDINATION
DISCHARGE PLANNING NOTE:    Received message from Fannie at [facility] stating pt has been accepted for [facility].     Electronically signed by Lissett Nettles RN on 4/6/2022 at 7:35 AM

## 2022-04-06 NOTE — CARE COORDINATION
ONGOING DISCHARGE PLAN:    Patient is alert and oriented x4. Spoke with patient regarding discharge plan and patient confirms that plan is still to discharge to home with [facility]   IV Atb  IV fluids  cxr  mucinex   Possible discharge to home soon           Will continue to follow for additional discharge needs.     Electronically signed by Ashley Wade RN on 4/6/2022 at 2:09 PM

## 2022-04-07 ENCOUNTER — APPOINTMENT (OUTPATIENT)
Dept: GENERAL RADIOLOGY | Age: 87
DRG: 177 | End: 2022-04-07
Payer: MEDICARE

## 2022-04-07 LAB
ABSOLUTE EOS #: 0.1 K/UL (ref 0–0.4)
ABSOLUTE LYMPH #: 1.6 K/UL (ref 1–4.8)
ABSOLUTE MONO #: 0.2 K/UL (ref 0.1–1.3)
ALBUMIN SERPL-MCNC: 2.9 G/DL (ref 3.5–5.2)
ALP BLD-CCNC: 46 U/L (ref 35–104)
ALT SERPL-CCNC: 21 U/L (ref 5–33)
ANION GAP SERPL CALCULATED.3IONS-SCNC: 14 MMOL/L (ref 9–17)
AST SERPL-CCNC: 28 U/L
BASOPHILS # BLD: 0 % (ref 0–2)
BASOPHILS ABSOLUTE: 0 K/UL (ref 0–0.2)
BILIRUB SERPL-MCNC: 0.19 MG/DL (ref 0.3–1.2)
BUN BLDV-MCNC: 31 MG/DL (ref 8–23)
CALCIUM SERPL-MCNC: 8.2 MG/DL (ref 8.6–10.4)
CHLORIDE BLD-SCNC: 103 MMOL/L (ref 98–107)
CO2: 19 MMOL/L (ref 20–31)
CREAT SERPL-MCNC: 0.98 MG/DL (ref 0.5–0.9)
EOSINOPHILS RELATIVE PERCENT: 2 % (ref 0–4)
GFR AFRICAN AMERICAN: >60 ML/MIN
GFR NON-AFRICAN AMERICAN: 53 ML/MIN
GFR SERPL CREATININE-BSD FRML MDRD: ABNORMAL ML/MIN/{1.73_M2}
GLUCOSE BLD-MCNC: 138 MG/DL (ref 70–99)
HCT VFR BLD CALC: 35 % (ref 36–46)
HEMOGLOBIN: 11.9 G/DL (ref 12–16)
LYMPHOCYTES # BLD: 42 % (ref 24–44)
MCH RBC QN AUTO: 36.3 PG (ref 26–34)
MCHC RBC AUTO-ENTMCNC: 33.9 G/DL (ref 31–37)
MCV RBC AUTO: 107.2 FL (ref 80–100)
MONOCYTES # BLD: 5 % (ref 1–7)
PDW BLD-RTO: 13 % (ref 11.5–14.9)
PLATELET # BLD: 132 K/UL (ref 150–450)
PMV BLD AUTO: 9.5 FL (ref 6–12)
POTASSIUM SERPL-SCNC: 4.4 MMOL/L (ref 3.7–5.3)
RBC # BLD: 3.27 M/UL (ref 4–5.2)
SEG NEUTROPHILS: 51 % (ref 36–66)
SEGMENTED NEUTROPHILS ABSOLUTE COUNT: 2 K/UL (ref 1.3–9.1)
SODIUM BLD-SCNC: 136 MMOL/L (ref 135–144)
TOTAL PROTEIN: 5.9 G/DL (ref 6.4–8.3)
WBC # BLD: 3.9 K/UL (ref 3.5–11)

## 2022-04-07 PROCEDURE — 6370000000 HC RX 637 (ALT 250 FOR IP): Performed by: FAMILY MEDICINE

## 2022-04-07 PROCEDURE — 94762 N-INVAS EAR/PLS OXIMTRY CONT: CPT

## 2022-04-07 PROCEDURE — 6360000002 HC RX W HCPCS: Performed by: FAMILY MEDICINE

## 2022-04-07 PROCEDURE — 36415 COLL VENOUS BLD VENIPUNCTURE: CPT

## 2022-04-07 PROCEDURE — 2580000003 HC RX 258: Performed by: FAMILY MEDICINE

## 2022-04-07 PROCEDURE — 97110 THERAPEUTIC EXERCISES: CPT

## 2022-04-07 PROCEDURE — 97116 GAIT TRAINING THERAPY: CPT

## 2022-04-07 PROCEDURE — 80053 COMPREHEN METABOLIC PANEL: CPT

## 2022-04-07 PROCEDURE — 85025 COMPLETE CBC W/AUTO DIFF WBC: CPT

## 2022-04-07 PROCEDURE — 97530 THERAPEUTIC ACTIVITIES: CPT

## 2022-04-07 PROCEDURE — 2060000000 HC ICU INTERMEDIATE R&B

## 2022-04-07 PROCEDURE — 71045 X-RAY EXAM CHEST 1 VIEW: CPT

## 2022-04-07 RX ORDER — AMLODIPINE BESYLATE 2.5 MG/1
2.5 TABLET ORAL DAILY
Status: DISCONTINUED | OUTPATIENT
Start: 2022-04-07 | End: 2022-04-08 | Stop reason: HOSPADM

## 2022-04-07 RX ADMIN — LEVOTHYROXINE SODIUM 125 MCG: 0.12 TABLET ORAL at 05:38

## 2022-04-07 RX ADMIN — METOPROLOL SUCCINATE 25 MG: 25 TABLET, EXTENDED RELEASE ORAL at 08:39

## 2022-04-07 RX ADMIN — DOXYCYCLINE 100 MG: 100 CAPSULE ORAL at 08:39

## 2022-04-07 RX ADMIN — Medication 30 MG: at 22:36

## 2022-04-07 RX ADMIN — AMLODIPINE BESYLATE 2.5 MG: 2.5 TABLET ORAL at 15:45

## 2022-04-07 RX ADMIN — Medication 30 MG: at 08:39

## 2022-04-07 RX ADMIN — GUAIFENESIN 600 MG: 600 TABLET, EXTENDED RELEASE ORAL at 08:39

## 2022-04-07 RX ADMIN — DOXYCYCLINE 100 MG: 100 CAPSULE ORAL at 22:37

## 2022-04-07 RX ADMIN — Medication 4.5 MG: at 22:33

## 2022-04-07 RX ADMIN — ALLOPURINOL 300 MG: 300 TABLET ORAL at 08:39

## 2022-04-07 RX ADMIN — GUAIFENESIN 600 MG: 600 TABLET, EXTENDED RELEASE ORAL at 22:33

## 2022-04-07 RX ADMIN — BENZONATATE 100 MG: 100 CAPSULE ORAL at 22:33

## 2022-04-07 RX ADMIN — ISOSORBIDE MONONITRATE 60 MG: 60 TABLET, EXTENDED RELEASE ORAL at 08:39

## 2022-04-07 RX ADMIN — ONDANSETRON 4 MG: 4 TABLET, ORALLY DISINTEGRATING ORAL at 08:39

## 2022-04-07 RX ADMIN — ENOXAPARIN SODIUM 30 MG: 100 INJECTION SUBCUTANEOUS at 08:39

## 2022-04-07 RX ADMIN — SODIUM CHLORIDE, PRESERVATIVE FREE 10 ML: 5 INJECTION INTRAVENOUS at 09:00

## 2022-04-07 RX ADMIN — OSELTAMIVIR PHOSPHATE 30 MG: 30 CAPSULE ORAL at 19:33

## 2022-04-07 RX ADMIN — ATORVASTATIN CALCIUM 10 MG: 20 TABLET, FILM COATED ORAL at 08:39

## 2022-04-07 RX ADMIN — CEFTRIAXONE SODIUM 1000 MG: 1 INJECTION, POWDER, FOR SOLUTION INTRAMUSCULAR; INTRAVENOUS at 19:42

## 2022-04-07 ASSESSMENT — ENCOUNTER SYMPTOMS
CHEST TIGHTNESS: 0
EYE ITCHING: 0
COUGH: 1
NAUSEA: 0
SHORTNESS OF BREATH: 1
COLOR CHANGE: 0
VOMITING: 0
EYE REDNESS: 0
BLOOD IN STOOL: 0
ABDOMINAL PAIN: 0
TROUBLE SWALLOWING: 0

## 2022-04-07 NOTE — PROGRESS NOTES
Progress Note    4/7/2022   1:24 PM    Name:  Julio eHnderson  MRN:    [id number]     Acct:     [de-identified]   Room:  2116/2116-01   Day: 1     Admit Date: 4/4/2022  4:19 PM  PCP: [facility]    Subjective:     C/C:   Chief Complaint   Patient presents with    Shortness of Breath       Interval History: Status: not changed. Patient has shortness of breath with minimal activity. Patient oxygen saturation is 97% on room air, heart rate stable afebrile, mildly elevated blood pressure readings noted. Recent labs reviewed sodium 136 creatinine 0.98, hemoglobin 11.9 WBC 3.9 platelet count 307    ROS:   all 10 systems reviewed and are negative except as noted    Review of Systems   Constitutional: Negative for chills and fatigue. HENT: Negative for drooling, mouth sores, sneezing and trouble swallowing. Eyes: Negative for redness and itching. Respiratory: Positive for cough and shortness of breath. Negative for chest tightness. Cardiovascular: Negative for chest pain, palpitations and leg swelling. Gastrointestinal: Negative for abdominal pain, blood in stool, nausea and vomiting. Endocrine: Negative for heat intolerance and polyphagia. Genitourinary: Negative for difficulty urinating, flank pain and pelvic pain. Musculoskeletal: Negative for arthralgias, joint swelling and neck stiffness. Skin: Negative for color change and pallor. Allergic/Immunologic: Negative for food allergies. Neurological: Negative for dizziness, seizures and headaches. Hematological: Does not bruise/bleed easily. Psychiatric/Behavioral: Negative for agitation, behavioral problems and suicidal ideas. The patient is not hyperactive. Medications: Allergies:    Allergies   Allergen Reactions    Codeine        Current Meds: amLODIPine (NORVASC) tablet 2.5 mg, Daily  guaiFENesin (MUCINEX) extended release tablet 600 mg, BID  perflutren lipid microspheres (DEFINITY) injection 1.65 mg, ONCE PRN  benzonatate (TESSALON) capsule 100 mg, TID PRN  dextromethorphan (DELSYM) 30 MG/5ML extended release liquid 30 mg, 2 times per day  oseltamivir (TAMIFLU) capsule 30 mg, Daily  sodium chloride flush 0.9 % injection 5-40 mL, 2 times per day  sodium chloride flush 0.9 % injection 5-40 mL, PRN  0.9 % sodium chloride infusion, PRN  enoxaparin (LOVENOX) injection 30 mg, Daily  metoprolol succinate (TOPROL XL) extended release tablet 25 mg, Daily  levothyroxine (SYNTHROID) tablet 125 mcg, Daily  isosorbide mononitrate (IMDUR) extended release tablet 60 mg, Daily  atorvastatin (LIPITOR) tablet 10 mg, Daily  allopurinol (ZYLOPRIM) tablet 300 mg, Daily  sodium chloride flush 0.9 % injection 10 mL, 2 times per day  sodium chloride flush 0.9 % injection 10 mL, PRN  0.9 % sodium chloride infusion, PRN  ondansetron (ZOFRAN-ODT) disintegrating tablet 4 mg, Q8H PRN   Or  ondansetron (ZOFRAN) injection 4 mg, Q6H PRN  magnesium hydroxide (MILK OF MAGNESIA) 400 MG/5ML suspension 30 mL, Daily PRN  acetaminophen (TYLENOL) tablet 650 mg, Q6H PRN   Or  acetaminophen (TYLENOL) suppository 650 mg, Q6H PRN  cefTRIAXone (ROCEPHIN) 1000 mg IVPB in 50 mL D5W minibag, Q24H  doxycycline monohydrate (MONODOX) capsule 100 mg, 2 times per day  ipratropium-albuterol (DUONEB) nebulizer solution 1 ampule, Q4H PRN  melatonin tablet 4.5 mg, Nightly PRN        Data:     Code Status:  DNR-CCA    No family history on file. Social History     Socioeconomic History    Marital status:       Spouse name: Not on file    Number of children: Not on file    Years of education: Not on file    Highest education level: Not on file   Occupational History    Not on file   Tobacco Use    Smoking status: Not on file    Smokeless tobacco: Not on file   Substance and Sexual Activity    Alcohol use: Not on file    Drug use: Not on file    Sexual activity: Not on file   Other Topics Concern    Not on file   Social History Narrative    Not on file     Social Determinants of Health     Financial Resource Strain:     Difficulty of Paying Living Expenses: Not on file   Food Insecurity:     Worried About Running Out of Food in the Last Year: Not on file    [place] of Food in the Last Year: Not on file   Transportation Needs:     Lack of Transportation (Medical): Not on file    Lack of Transportation (Non-Medical): Not on file   Physical Activity:     Days of Exercise per Week: Not on file    Minutes of Exercise per Session: Not on file   Stress:     Feeling of Stress : Not on file   Social Connections:     Frequency of Communication with Friends and Family: Not on file    Frequency of Social Gatherings with Friends and Family: Not on file    Attends Scientology Services: Not on file    Active Member of [address] [place] [address] [employer] or Organizations: Not on file    Attends Club or Organization Meetings: Not on file    Marital Status: Not on file   Intimate Partner Violence:     Fear of Current or Ex-Partner: Not on file    Emotionally Abused: Not on file    Physically Abused: Not on file    Sexually Abused: Not on file   Housing Stability:     Unable to Pay for Housing in the Last Year: Not on file    Number of [place] in the Last Year: Not on file    Unstable Housing in the Last Year: Not on file       I/O (24Hr):     Intake/Output Summary (Last 24 hours) at 4/7/2022 1324  Last data filed at 4/7/2022 0930  Gross per 24 hour   Intake 885 ml   Output 1025 ml   Net -140 ml     Radiology:  XR CHEST PORTABLE    Result Date: 4/4/2022  Possible left lower lobe pneumonia       Labs:  Recent Results (from the past 24 hour(s))   Comprehensive Metabolic Panel w/ Reflex to MG    Collection Time: 04/07/22  4:33 AM   Result Value Ref Range    Glucose 138 (H) 70 - 99 mg/dL    BUN 31 (H) 8 - 23 mg/dL    CREATININE 0.98 (H) 0.50 - 0.90 mg/dL    Calcium 8.2 (L) 8.6 - 10.4 mg/dL    Sodium 136 135 - 144 mmol/L    Potassium 4.4 3.7 - 5.3 mmol/L    Chloride 103 98 - 107 mmol/L    CO2 19 (L) 20 - 31 mmol/L    Anion Gap 14 9 - 17 mmol/L    Alkaline Phosphatase 46 35 - 104 U/L    ALT 21 5 - 33 U/L    AST 28 <32 U/L    Total Bilirubin 0.19 (L) 0.3 - 1.2 mg/dL    Total Protein 5.9 (L) 6.4 - 8.3 g/dL    Albumin 2.9 (L) 3.5 - 5.2 g/dL    GFR Non- 53 (L) >60 mL/min    GFR African American >60 >60 mL/min    GFR Comment         CBC auto differential    Collection Time: 22  4:33 AM   Result Value Ref Range    WBC 3.9 3.5 - 11.0 k/uL    RBC 3.27 (L) 4.0 - 5.2 m/uL    Hemoglobin 11.9 (L) 12.0 - 16.0 g/dL    Hematocrit 35.0 (L) 36 - 46 %    .2 (H) 80 - 100 fL    MCH 36.3 (H) 26 - 34 pg    MCHC 33.9 31 - 37 g/dL    RDW 13.0 11.5 - 14.9 %    Platelets 209 (L) 518 - 450 k/uL    MPV 9.5 6.0 - 12.0 fL    Seg Neutrophils 51 36 - 66 %    Lymphocytes 42 24 - 44 %    Monocytes 5 1 - 7 %    Eosinophils % 2 0 - 4 %    Basophils 0 0 - 2 %    Segs Absolute 2.00 1.3 - 9.1 k/uL    Absolute Lymph # 1.60 1.0 - 4.8 k/uL    Absolute Mono # 0.20 0.1 - 1.3 k/uL    Absolute Eos # 0.10 0.0 - 0.4 k/uL    Basophils Absolute 0.00 0.0 - 0.2 k/uL       Physical Examination:        Vitals:  BP (!) 145/63   Pulse 74   Temp 97.8 °F (36.6 °C) (Oral)   Resp 18   Ht 5' 5\" (1.651 m)   Wt 151 lb 10.8 oz (68.8 kg)   SpO2 97%   BMI 25.24 kg/m²   Temp (24hrs), Av.9 °F (36.6 °C), Min:97.7 °F (36.5 °C), Max:98.3 °F (36.8 °C)    Recent Labs     22  1850   POCGLU 334*         Physical Exam  Vitals reviewed. HENT:      Head: Normocephalic. Right Ear: External ear normal.      Left Ear: External ear normal.      Nose: Nose normal.      Mouth/Throat:      Mouth: Mucous membranes are moist.      Pharynx: Oropharynx is clear. Eyes:      Conjunctiva/sclera: Conjunctivae normal.   Cardiovascular:      Rate and Rhythm: Normal rate and regular rhythm. Pulses: Normal pulses. Heart sounds: Normal heart sounds.    Pulmonary:      Effort: Pulmonary effort is normal.      Breath sounds: Examination of the left-lower field reveals rhonchi. Rhonchi present. No wheezing. Abdominal:      General: Bowel sounds are normal.      Palpations: Abdomen is soft. Tenderness: There is no abdominal tenderness. There is no right CVA tenderness or left CVA tenderness. Musculoskeletal:         General: No deformity. Cervical back: Normal range of motion and neck supple. Right lower leg: No edema. Left lower leg: No edema. Skin:     General: Skin is warm. Capillary Refill: Capillary refill takes less than 2 seconds. Coloration: Skin is not jaundiced. Neurological:      General: No focal deficit present. Mental Status: She is alert. Mental status is at baseline. Psychiatric:         Mood and Affect: Mood normal.         Behavior: Behavior normal.         Assessment:        Primary Problem  Left lower lobe pneumonia     Principal Problem:    Left lower lobe pneumonia  Active Problems:    Influenza A    Pneumonia    Primary hypertension    Hypothyroidism    ASCVD (arteriosclerotic cardiovascular disease)    Chest pain  Resolved Problems:    * No resolved hospital problems. *      History reviewed. No pertinent past medical history. Plan:        1. IV Rocephin 1 g daily  2. Nocturnal home O2 eval  3. Ambulatory home O2 eval  4.  doxycycline 100 mg p.o. twice daily  5. Check chest x-ray  6. MRSA nasal swab  is negative  7. Start Mucinex 600 mg p.o. twice daily  8. Nutritional supplement 3 times a day  9. Midline  10. Tessalon Perles 100 mg p.o. every 8 hours as needed for cough  11. Delsym 30 mg p.o. twice daily  12.  Tamiflu 30 mg p.o. daily  13.  check 2D echo report shows an ejection fraction of 40 %  14.  x-ray report shows left lower lobe pneumonia  15.  12 lead EKG shows normal sinus rhythm  16.  DuoNeb nebulizer treatment every 4 hours as needed for shortness of breath  1. Replace electrolytes as per sliding scale  2.  Home medications reviewed and appropriate medications continued  3.  Reviewed labs and imaging studies from last 24 hours and results explained to patient       Electronically signed by Gopi Aguilar MD

## 2022-04-07 NOTE — PROGRESS NOTES
Home Oxygen Evaluation    Home Oxygen Evaluation completed.     Resting SpO2 on room air = 93%    SpO2 on room air with exercise = 90%      Roque Reilly RCP  1:30 PM

## 2022-04-07 NOTE — FLOWSHEET NOTE
Pt was pleasant and receptive as she shared her health challenges over past few months. She thinks she will be d/c tomorrow and is going home (\"I will crawl around before I go back to one of those places\" meaning [facility]). Her family are supportive and involved, and she is active at her [employer]. Her  has been calling her. Writer provided listening presence, and pt welcomed prayer. 04/07/22 1931   Encounter Summary   Services provided to: Patient   Referral/Consult From: [address] [place] [address] Children;Orthodox/brandi community   Continue Visiting   (4-7-22)   Complexity of Encounter Moderate   Length of Encounter 15 minutes   Spiritual Assessment Completed Yes   Spiritual/Episcopal   Type Spiritual support   Assessment Calm; Approachable   Intervention Active listening;Explored feelings, thoughts, concerns;Explored coping resources;Prayer;Sustaining presence/ Ministry of presence; Discussed illness/injury and it's impact; Discussed meaning/purpose   Outcome Expressed gratitude;Engaged in conversation;Expressed feelings/needs/concerns;Coping;Receptive

## 2022-04-07 NOTE — PROGRESS NOTES
Physical Therapy  [facility]   Physical Therapy Progress Note    Date: 22  Patient Name: Jessica Jacobs       Room:   MRN: [id number]   Account: [de-identified]   : 1930  (80 y.o.) Gender: female     Referring Practitioner: Ebony Sinha MD  Diagnosis: Left lower lobe pneumonia  Past Medical History:  has no past medical history on file. Past Surgical History:   has no past surgical history on file. Additional Pertinent Hx: Influenza       Restrictions/Precautions  Restrictions/Precautions: General Precautions; Fall Risk;Contact Precautions (DROPLET)  Required Braces or Orthoses?: No  Implants present? : Metal implants (sternal wires)  Position Activity Restriction  Other position/activity restrictions: up as tolerated    Subjective: Pt sitting up in chair upon arrival to room. Agreeable to work with PT. Vital Signs  Patient Currently in Pain: Denies        Oxygen Therapy  O2 Device: None (Room air)          Bed Mobility:   Bed Mobility  Supine to Sit: Supervision (HOB elevated)  Sit to Supine: Supervision (HOB elevated)    Transfers:  Sit to Stand: Contact guard assistance  Stand to sit: Contact guard assistance                 Ambulation 1  Surface: level tile  Device: Rolling Walker  Assistance: Contact guard assistance  Quality of Gait: normal nemo, no LOB, steady (slightly kyphotic)  Distance: 195ft  Comments: SPO2 93% pre gait, SPO2 90% post gait. Lowest pt's oxygen dropped was 89% on room air briefly. Stairs/Curb  Stairs?: Yes  Stairs  # Steps : 4  Stairs Height: 6\"  Device: No Device  Assistance: Contact guard assistance  Comment: Pt used counter by sink as support with UE. Able to demonstrate stepping up/down 4\" box step without difficulty. BALANCE Posture: Good  Sitting - Static: Good  Sitting - Dynamic: Good  Standing - Static: Good;-  Standing - Dynamic: Good;-  Comments: Standing balance with RW.  Pt feels more secure with RW for safety at home. EXERCISES    Other exercises?: Yes  Other exercises 1: (B) LE seated ex x 10, HEP issued  Other exercises 2: Standing HEP issued and reviewed  Other exercises 3: Pt educated on pacing skills and energy conservation techniques when she gets home              PT Equipment Recommendations  Equipment Needed: Yes  Mobility Devices: Kate Gallo: Rolling  Other: Pt would benefit from RW due to limited endurance and stability in gait - pt feels more secure and presents wtih more safer gait for home with RW. Other Comments  Comments: Pt's biggest limitation is endurance/fatigue. Reports recovering from [place] and still is not back to where she used to be. Current Treatment Recommendations: Strengthening,Balance Training,Functional Mobility Training,Transfer Training,Endurance Training,Gait Training,Stair training,Equipment Evaluation, Education, & procurement,Patient/Caregiver Education & Training,Safety Education & Training,Home Exercise Program    Conditions Requiring Skilled Therapeutic Intervention  Body structures, Functions, Activity limitations: Decreased ADL status; Decreased functional mobility ; Decreased ROM; Decreased strength;Decreased balance;Decreased endurance  Assessment: Pt most limited by endurance but doing well amb wtih RW. Pt would benefit from continued PT and use of RW upon d/c. Treatment Diagnosis: Impaired functional mobility 2* shortness of breath  Prognosis: Good  Decision Making: Medium Complexity  REQUIRES PT FOLLOW UP: Yes  Discharge Recommendations: Patient would benefit from continued therapy after discharge    Goals  Short term goals  Time Frame for Short term goals: 3-4 days  Short term goal 1: pt to demo IND bed mobility. Short term goal 2: Pt to demo MOD I transfers with good technique using RW. Short term goal 3: Pt to amb 100' with device, MOD I. Short term goal 4: Pt to ascend/descend 2 stairs 1-2 UE support, supervision.   Short term goal 5: Pt to demo [facility] technique for HEP for endurance, balance, and strengthening.        04/07/22 1424   PT Individual Minutes   Time In 1259   Time Out 1338   Minutes 39       Electronically signed by Gagan Lewis PTA on 4/7/22 at 2:25 PM EDT

## 2022-04-07 NOTE — CARE COORDINATION
ONGOING DISCHARGE PLAN:    Patient is alert and oriented x4. Spoke with patient regarding discharge plan and patient confirms that plan is still to discharge to home with no [place]   Patient remains on IV atb   Start mucinex   cxr   Nocturnal oxygen eval ordered   Walker ordered was delivered to the patients room     Will continue to follow for additional discharge needs.     Electronically signed by Nader Mitchell RN on 4/7/2022 at 2:53 PM

## 2022-04-07 NOTE — PROGRESS NOTES
Physician Progress Note      Melissa ORANTES #:                  [id number]  :                       1930  ADMIT DATE:       2022 4:19 PM  DISCH DATE:  RESPONDING  PROVIDER #:        Rush Hoover MD          QUERY TEXT:    Pt presented w/ SOB and Cough, admitted for treatment of PNA and Influenza A.   ECHO completed on  indicated an EF of 55-89 %, diastolic dysfunction and   dyskinetic mid-inferior wall movement, Pro . If possible, please document in progress notes and discharge summary if you   are evaluating and/or treating any of the following: The medical record reflects the following:  Risk Factors: HTN; Hx of CABG x5; COVID 19  Clinical Indicators: c/o SOB and Cough; Assessments indicate WHEAT; Rales/   Crackles to bilateral Lung bases. ECHO Findings of Reduced LV systolic   function, LVEF 92-35%, Grade 1 diastolic dysfunction; Dyskinetic mid-inferior   wall movement. RV pressure: 31mmHg; RA Pressure: 8 mmHg;  ProBNP 719; No documented past medical Hx of CHF. : Patient reports   increased SOB. Tachypnea up to 43/min. Weight Increase 68.2kg to 69.5kg. Treatment: ECHO for evaluation, Home meds of Imdur, Lipator, Toprol XL  Options provided:  -- Acute Systolic CHF/HFrEF  -- Acute Systolic and Diastolic CHF  -- Acute on Chronic Systolic CHF/HFrEF  -- Acute on Chronic Systolic and Diastolic CHF  -- Chronic Systolic CHF/HFrEF  -- Chronic Systolic and Diastolic CHF  -- Other - I will add my own diagnosis  -- Disagree - Not applicable / Not valid  -- Disagree - Clinically unable to determine / Unknown  -- Refer to Clinical Documentation Reviewer    PROVIDER RESPONSE TEXT:    This patient is in acute systolic and diastolic CHF. Query created by: Angeles Tellez on 2022 6:51 PM      QUERY TEXT:    Pt admitted w/ PNA and Influenza A. Pt noted to have elevated Creatinine of   1.39 mg/dL, no documented history of CKD noted.  If possible, please document in the progress notes and discharge summary if you are evaluating and/or   treating any of the following: The medical record reflects the following:  Risk Factors: Acute illness, decreased PO intake  Clinical Indicators: Creatinine elevated at 1.39 mg/dL. Followed by a downward   trend 1.22 and 0.98 s/p hydration. Treatment: IVF hydration, treatment of PNA and Influenza.     Defined by Kidney Disease Improving Global Outcomes (KDIGO) clinical practice   guideline for acute kidney injury:  -Increase in SCr by greater than or equal to 0.3 mg/dl within 48 hours; or  -Increase or decrease in SCr to greater than or equal to 1.5 times baseline,   which is known or presumed to have occurred within the prior 7 days; or  -Urine volume < 0.5ml/kg/h for 6 hours  Options provided:  -- Acute kidney failure  -- Elevated creatinine w/o WILLARD  -- Other - I will add my own diagnosis  -- Disagree - Not applicable / Not valid  -- Disagree - Clinically unable to determine / Unknown  -- Refer to Clinical Documentation Reviewer    PROVIDER RESPONSE TEXT:    Willard    Query created by: Irvin Galicia on 4/7/2022 7:01 PM      Electronically signed by:  Elsa Ludwig MD 4/7/2022 7:33 PM

## 2022-04-07 NOTE — PROGRESS NOTES
[address]   INPATIENT OCCUPATIONAL THERAPY  PROGRESS NOTE  Date: 2022  Patient Name: John Martin      Room: /2116-01  MRN: [id number]    : 1930  (80 y.o.) Gender: female     Discharge Recommendations:  Further Occupational Therapy is recommended upon facility discharge. Equipment Needed:  (TBD)    Referring Practitioner: Lino Aranda MD  Diagnosis: Left lower lobe pneumonia  General  Chart Reviewed: Yes  Patient assessed for rehabilitation services?: Yes  Additional Pertinent Hx: patient presented to the ER with shortness of breath and coughing with intermittent sputum production for the last 3 days.  Patient has mild nonradiating intermittent pleuritic chest pain associated with cough  Family / Caregiver Present: No  Referring Practitioner: Lino Aranda MD  Diagnosis: Left lower lobe pneumonia    Restrictions  Restrictions/Precautions: General Precautions,Fall Risk,Contact Precautions (DROPLET)  Implants present? : Metal implants (sternal wires)  Other position/activity restrictions: up as tolerated  Required Braces or Orthoses?: No      Subjective  Subjective: \"My kids are going to be there to help me\"  Patient Currently in Pain: Denies  Overall Orientation Status: Within Functional Limits          Objective  Cognition  Overall Cognitive Status: Impaired  Safety Judgement: Decreased awareness of need for safety  Awareness of Errors: Assistance required to identify errors made  Sequencing and Organization: Assistance required to identify errors made;Assistance required to generate solutions;Assistance required to implement solutions  Bed mobility  Sit to Supine: Supervision  Balance  Sitting Balance: Supervision  Standing Balance: Stand by assistance (w/RW)  Standing Balance  Time: 30 sec  Activity: functional mobility  Comment: with RW for UE support  Functional Mobility  Functional - Mobility Device: Rolling Walker  Activity: To/from bathroom  Assist Level: Contact guard assistance  Functional Mobility Comments: VC for safety and keeping with RW   ADL  Feeding: Setup  Grooming: Setup  UE Bathing: Contact guard assistance  LE Bathing: Minimal assistance  UE Dressing: Contact guard assistance  LE Dressing: Minimal assistance  Toileting: Supervision  Additional Comments: ADL scores based on skilled observation and clinical reasoning, unless otherwise noted. Pt completing toileting upon writer arrival and ambulates with RW. Reports she had SUP going into bathroom. Pt reports she feel she will be good at home with assist from her kids. Transfers  Sit to stand: Supervision  Stand to sit: Supervision  Transfer Comments: Min cues for hand placement for safety  Toilet Transfers  Toilet - Technique: Ambulating  Equipment Used: Grab bars  Toilet Transfer: Supervision  Toilet Transfers Comments: Cued for hand placement for safety  Type of ROM/Therapeutic Exercise  Type of ROM/Therapeutic Exercise: Free weights (1#)  Comment: Pt instruction in BUe exercises for facilitation of increased strength and activity tolerance. Pt complete X10 reps each. Treatment cut short by arrival of x-ray tech. Exercises  Scapular Protraction: x  Scapular Retraction: x  Shoulder Flexion: x  Shoulder Extension: x  Horizontal ABduction: x  Elbow Flexion: x  Elbow Extension: x                          Assessment  Performance deficits / Impairments: Decreased functional mobility ; Decreased ADL status; Decreased strength;Decreased safe awareness;Decreased endurance;Decreased balance;Decreased high-level IADLs  Prognosis: Good  Discharge Recommendations: Patient would benefit from continued therapy after discharge  Activity Tolerance: Patient Tolerated treatment well  Safety Devices in place: Yes  Type of devices: Left in bed;Call light within reach; Patient at risk for falls             Patient Education: OT POC, safety with functional mobility and use of RW, BUE ex's for optimal rehab, benefits of assist from family for optimal safety at home.       Learner:patient  Method: explanation       Outcome: acknowledged understanding of      Plan  Safety Devices  Safety Devices in place: Yes  Type of devices: Left in bed,Call light within reach,Patient at risk for falls  Plan  Times per week: 4-5  Current Treatment Recommendations: Strengthening,Balance Training,Functional Mobility Reji Haro Education & Training,Patient/Caregiver Education & Training,Equipment Evaluation, Education, & procurement,Self-Care / ADL      Goals  Short term goals  Time Frame for Short term goals: By discharge  Short term goal 1: Pt will perform BADLs with mod I and Good safety  Short term goal 2: Pt will perform transfers/functional mobility mod I and Good safety  Short term goal 3: Pt will V/D at least three EC/WS techniques to increase IND in daily routine  Short term goal 4: Pt will perform UE exercises/hand strengthening to increase strength/endurance for improved functional use  Short term goal 5: Pt will actively participate in 15+ minutes of therapeutic exercise/functional activity to promote safety and independence with self care and mobility    OT Individual Minutes  Time In: 1504  Time Out: Thomas 59  Minutes: 9      Electronically signed by MARLY Freeman on 4/7/22 at 3:38 PM EDT

## 2022-04-08 VITALS
BODY MASS INDEX: 25.2 KG/M2 | RESPIRATION RATE: 20 BRPM | TEMPERATURE: 97.7 F | DIASTOLIC BLOOD PRESSURE: 62 MMHG | HEIGHT: 65 IN | HEART RATE: 70 BPM | OXYGEN SATURATION: 95 % | WEIGHT: 151.24 LBS | SYSTOLIC BLOOD PRESSURE: 148 MMHG

## 2022-04-08 LAB
ABSOLUTE EOS #: 0.2 K/UL (ref 0–0.4)
ABSOLUTE LYMPH #: 1.5 K/UL (ref 1–4.8)
ABSOLUTE MONO #: 0.2 K/UL (ref 0.1–1.3)
ALBUMIN SERPL-MCNC: 3.1 G/DL (ref 3.5–5.2)
ALP BLD-CCNC: 57 U/L (ref 35–104)
ALT SERPL-CCNC: 77 U/L (ref 5–33)
ANION GAP SERPL CALCULATED.3IONS-SCNC: 14 MMOL/L (ref 9–17)
AST SERPL-CCNC: 116 U/L
BASOPHILS # BLD: 0 % (ref 0–2)
BASOPHILS ABSOLUTE: 0 K/UL (ref 0–0.2)
BILIRUB SERPL-MCNC: 0.22 MG/DL (ref 0.3–1.2)
BUN BLDV-MCNC: 30 MG/DL (ref 8–23)
CALCIUM SERPL-MCNC: 8.6 MG/DL (ref 8.6–10.4)
CHLORIDE BLD-SCNC: 101 MMOL/L (ref 98–107)
CO2: 21 MMOL/L (ref 20–31)
CREAT SERPL-MCNC: 1 MG/DL (ref 0.5–0.9)
EOSINOPHILS RELATIVE PERCENT: 4 % (ref 0–4)
GFR AFRICAN AMERICAN: >60 ML/MIN
GFR NON-AFRICAN AMERICAN: 52 ML/MIN
GFR SERPL CREATININE-BSD FRML MDRD: ABNORMAL ML/MIN/{1.73_M2}
GLUCOSE BLD-MCNC: 136 MG/DL (ref 70–99)
HCT VFR BLD CALC: 35.3 % (ref 36–46)
HEMOGLOBIN: 12.2 G/DL (ref 12–16)
LYMPHOCYTES # BLD: 32 % (ref 24–44)
MCH RBC QN AUTO: 36.3 PG (ref 26–34)
MCHC RBC AUTO-ENTMCNC: 34.5 G/DL (ref 31–37)
MCV RBC AUTO: 105.3 FL (ref 80–100)
MONOCYTES # BLD: 5 % (ref 1–7)
PDW BLD-RTO: 12.8 % (ref 11.5–14.9)
PLATELET # BLD: 161 K/UL (ref 150–450)
PMV BLD AUTO: 9.5 FL (ref 6–12)
POTASSIUM SERPL-SCNC: 4.4 MMOL/L (ref 3.7–5.3)
RBC # BLD: 3.36 M/UL (ref 4–5.2)
SEG NEUTROPHILS: 59 % (ref 36–66)
SEGMENTED NEUTROPHILS ABSOLUTE COUNT: 2.7 K/UL (ref 1.3–9.1)
SODIUM BLD-SCNC: 136 MMOL/L (ref 135–144)
TOTAL PROTEIN: 6.1 G/DL (ref 6.4–8.3)
WBC # BLD: 4.5 K/UL (ref 3.5–11)

## 2022-04-08 PROCEDURE — 97116 GAIT TRAINING THERAPY: CPT

## 2022-04-08 PROCEDURE — 2580000003 HC RX 258: Performed by: STUDENT IN AN ORGANIZED HEALTH CARE EDUCATION/TRAINING PROGRAM

## 2022-04-08 PROCEDURE — 6370000000 HC RX 637 (ALT 250 FOR IP): Performed by: FAMILY MEDICINE

## 2022-04-08 PROCEDURE — 85025 COMPLETE CBC W/AUTO DIFF WBC: CPT

## 2022-04-08 PROCEDURE — 2580000003 HC RX 258: Performed by: FAMILY MEDICINE

## 2022-04-08 PROCEDURE — 97110 THERAPEUTIC EXERCISES: CPT

## 2022-04-08 PROCEDURE — 36415 COLL VENOUS BLD VENIPUNCTURE: CPT

## 2022-04-08 PROCEDURE — 6360000002 HC RX W HCPCS: Performed by: FAMILY MEDICINE

## 2022-04-08 PROCEDURE — 80053 COMPREHEN METABOLIC PANEL: CPT

## 2022-04-08 RX ORDER — BENZONATATE 100 MG/1
100 CAPSULE ORAL 3 TIMES DAILY PRN
Qty: 15 CAPSULE | Refills: 0 | Status: SHIPPED | OUTPATIENT
Start: 2022-04-08 | End: 2022-04-13

## 2022-04-08 RX ORDER — DOXYCYCLINE 100 MG/1
100 CAPSULE ORAL EVERY 12 HOURS SCHEDULED
Qty: 6 CAPSULE | Refills: 0 | Status: SHIPPED | OUTPATIENT
Start: 2022-04-08 | End: 2022-04-11

## 2022-04-08 RX ORDER — ALBUTEROL SULFATE 90 UG/1
2 AEROSOL, METERED RESPIRATORY (INHALATION) EVERY 6 HOURS PRN
Qty: 18 G | Refills: 0 | Status: SHIPPED | OUTPATIENT
Start: 2022-04-08

## 2022-04-08 RX ORDER — OSELTAMIVIR PHOSPHATE 30 MG/1
30 CAPSULE ORAL DAILY
Qty: 1 CAPSULE | Refills: 0 | Status: SHIPPED | OUTPATIENT
Start: 2022-04-08 | End: 2022-04-09

## 2022-04-08 RX ADMIN — ALLOPURINOL 300 MG: 300 TABLET ORAL at 09:12

## 2022-04-08 RX ADMIN — Medication 30 MG: at 09:15

## 2022-04-08 RX ADMIN — Medication 5 ML: at 13:23

## 2022-04-08 RX ADMIN — SODIUM CHLORIDE, PRESERVATIVE FREE 10 ML: 5 INJECTION INTRAVENOUS at 13:23

## 2022-04-08 RX ADMIN — ISOSORBIDE MONONITRATE 60 MG: 60 TABLET, EXTENDED RELEASE ORAL at 07:52

## 2022-04-08 RX ADMIN — AMLODIPINE BESYLATE 2.5 MG: 2.5 TABLET ORAL at 07:51

## 2022-04-08 RX ADMIN — LEVOTHYROXINE SODIUM 125 MCG: 0.12 TABLET ORAL at 05:19

## 2022-04-08 RX ADMIN — ATORVASTATIN CALCIUM 10 MG: 20 TABLET, FILM COATED ORAL at 09:09

## 2022-04-08 RX ADMIN — ENOXAPARIN SODIUM 30 MG: 100 INJECTION SUBCUTANEOUS at 09:16

## 2022-04-08 RX ADMIN — DOXYCYCLINE 100 MG: 100 CAPSULE ORAL at 09:12

## 2022-04-08 RX ADMIN — METOPROLOL SUCCINATE 25 MG: 25 TABLET, EXTENDED RELEASE ORAL at 07:52

## 2022-04-08 RX ADMIN — GUAIFENESIN 600 MG: 600 TABLET, EXTENDED RELEASE ORAL at 09:14

## 2022-04-08 ASSESSMENT — ENCOUNTER SYMPTOMS
COUGH: 0
EYE ITCHING: 0
SHORTNESS OF BREATH: 0
COLOR CHANGE: 0
ABDOMINAL PAIN: 0
NAUSEA: 0
TROUBLE SWALLOWING: 0
CHEST TIGHTNESS: 0
VOMITING: 0
EYE REDNESS: 0
BLOOD IN STOOL: 0

## 2022-04-08 NOTE — PROGRESS NOTES
Patient educated on discharge instructions which include: medication schedule, reasons for new medications and some side effects and need to follow-up. Patientprescriptions sent to [facility]. Patient verbalizes understanding of discharge and states readiness for discharge. All belongings were gathered by patient and in patient's possession. No distress noted at this time.      Current vital signs:  BP (!) 148/62   Pulse 70   Temp 97.7 °F (36.5 °C) (Oral)   Resp 20   Ht 5' 5\" (1.651 m)   Wt 151 lb 3.8 oz (68.6 kg)   SpO2 95%   BMI 25.17 kg/m²                MEWS Score: 1

## 2022-04-08 NOTE — DISCHARGE SUMMARY
Discharge Summary      Patient ID: Madisyn Melgar    MRN: [id number]     Acct:  [de-identified]       Patient's PCP: Curtis Valenzuela    Admit Date: 4/4/2022     Discharge Date:   4/8/2022      Admitting Physician: Connie Morse MD    Discharge Physician: Hilary Stahl MD     Discharge Diagnoses:    Primary Problem  Left lower lobe pneumonia    Principal Problem:    Left lower lobe pneumonia  Active Problems:    Influenza A    Pneumonia    Primary hypertension    Hypothyroidism    ASCVD (arteriosclerotic cardiovascular disease)    Chest pain  Resolved Problems:    * No resolved hospital problems. *    History reviewed. No pertinent past medical history. The patient was seen and examined on day of discharge and this discharge summary is in conjunction with daily progress note from day of discharge. Code Status:  [facility] Hospital Course:   H&P Reviewed. Patient with a medical stay of hypertension, dilated cardiomyopathy was admitted with left lower lobe pneumonia and influenza A and AMADO. Patient was started on IV antibiotics p.o. Tamiflu and IV fluids. 2D echo showed an ejection fraction of 35 to 40%. Patient did not qualify for home O2. patient symptoms improved during hospital stay. Patient is being discharged in stable condition    Discharge day progress note: Today   Patient was seen and examined at bedside today. Hemodynamically stable. No chest pain, shortness of breath, fever, chills, nausea, vomiting, palpitations, or abdominal pain reported. No events reported overnight. Review of Systems   Constitutional: Negative for chills and fatigue. HENT: Negative for drooling, mouth sores, sneezing and trouble swallowing. Eyes: Negative for redness and itching. Respiratory: Negative for cough, chest tightness and shortness of breath. Cardiovascular: Negative for chest pain, palpitations and leg swelling.    Gastrointestinal: Negative for abdominal pain, blood in stool, nausea and vomiting. Endocrine: Negative for heat intolerance and polyphagia. Genitourinary: Negative for difficulty urinating, flank pain and pelvic pain. Musculoskeletal: Negative for arthralgias, joint swelling and neck stiffness. Skin: Negative for color change and pallor. Allergic/Immunologic: Negative for food allergies. Neurological: Negative for dizziness, seizures and headaches. Hematological: Does not bruise/bleed easily. Psychiatric/Behavioral: Negative for agitation, behavioral problems and suicidal ideas. The patient is not hyperactive. Physical Exam  Vitals reviewed. HENT:      Head: Normocephalic. Right Ear: External ear normal.      Left Ear: External ear normal.      Nose: Nose normal.      Mouth/Throat:      Mouth: Mucous membranes are moist.      Pharynx: Oropharynx is clear. Eyes:      Conjunctiva/sclera: Conjunctivae normal.   Cardiovascular:      Rate and Rhythm: Normal rate and regular rhythm. Pulses: Normal pulses. Heart sounds: Normal heart sounds. Pulmonary:      Effort: Pulmonary effort is normal.      Breath sounds: Normal breath sounds. No wheezing or rales. Abdominal:      General: Bowel sounds are normal.      Palpations: Abdomen is soft. Tenderness: There is no abdominal tenderness. There is no right CVA tenderness or left CVA tenderness. Musculoskeletal:         General: No deformity. Cervical back: Normal range of motion and neck supple. Right lower leg: No edema. Left lower leg: No edema. Skin:     General: Skin is warm. Capillary Refill: Capillary refill takes less than 2 seconds. Coloration: Skin is not jaundiced. Neurological:      General: No focal deficit present. Mental Status: She is alert. Mental status is at baseline.    Psychiatric:         Mood and Affect: Mood normal.         Behavior: Behavior normal.         Consults:  IP CONSULT TO INTERNAL MEDICINE    Significant Diagnostic Studies: as above, and as follows:    Treatments: as above    Disposition: home    Discharged Condition: Stable    Follow Up: Dami Guerrero in one week    Discharge Medications:      Medication List      START taking these medications    albuterol sulfate  (90 Base) MCG/ACT inhaler  Commonly known as: Proventil HFA  Inhale 2 puffs into the lungs every 6 hours as needed for Wheezing     benzonatate 100 MG capsule  Commonly known as: TESSALON  Take 1 capsule by mouth 3 times daily as needed for Cough     doxycycline monohydrate 100 MG capsule  Commonly known as: MONODOX  Take 1 capsule by mouth every 12 hours for 3 days     oseltamivir 30 MG capsule  Commonly known as: TAMIFLU  Take 1 capsule by mouth daily for 1 dose        CONTINUE taking these medications    allopurinol 300 MG tablet  Commonly known as: ZYLOPRIM     atorvastatin 10 MG tablet  Commonly known as: LIPITOR     estradiol 0.5 MG tablet  Commonly known as: ESTRACE     isosorbide mononitrate 60 MG extended release tablet  Commonly known as: IMDUR     levothyroxine 125 MCG tablet  Commonly known as: SYNTHROID     metoprolol succinate 25 MG extended release tablet  Commonly known as: TOPROL XL     triamterene-hydroCHLOROthiazide 37.5-25 MG per tablet  Commonly known as: MAXZIDE-25           Where to Get Your Medications      These medications were sent to [address] [place], [place] [address]    Phone: [phone]   · albuterol sulfate  (90 Base) MCG/ACT inhaler  · benzonatate 100 MG capsule  · doxycycline monohydrate 100 MG capsule  · oseltamivir 30 MG capsule                  Time Spent on discharge is more than  35 min in the examination, evaluation, counseling and review of medications and discharge plan.       Electronically signed by Hannah Parrish MD     Copy sent to Dr. Dami Guerrero

## 2022-04-08 NOTE — PLAN OF CARE
Problem: Falls - Risk of:  Goal: Will remain free from falls  Description: Will remain free from falls  Outcome: Ongoing  Note: Patient alert and oriented. Bed alarm on. Assisted to bathroom. Able t meake needs known. Goal: Absence of physical injury  Description: Absence of physical injury  Outcome: Ongoing     Problem: Activity:  Goal: Energy level will increase  Description: Energy level will increase  Outcome: Ongoing  Goal: Ability to return to normal activity level will improve  Description: Ability to return to normal activity level will improve  Outcome: Ongoing     Problem: Fluid Volume:  Goal: Maintenance of adequate hydration will improve  Description: Maintenance of adequate hydration will improve  Outcome: Ongoing  Note: Patient tolerating food and fluids. Problem: Health Behavior:  Goal: Adoption of positive health habits will improve  Description: Adoption of positive health habits will improve  Outcome: Ongoing  Goal: Compliance with immunization standards will improve  Description: Compliance with immunization standards will improve  Outcome: Ongoing     Problem: Physical Regulation:  Goal: Complications related to the disease process, condition or treatment will be avoided or minimized  Description: Complications related to the disease process, condition or treatment will be avoided or minimized  Outcome: Ongoing  Note: VS stable. Afebrile. Goal: Ability to maintain clinical measurements within normal limits will improve  Description: Ability to maintain clinical measurements within normal limits will improve  Outcome: Ongoing  Goal: Signs and symptoms of infection will decrease  Description: Signs and symptoms of infection will decrease  Outcome: Ongoing     Problem: Respiratory:  Goal: Respiratory status will improve  Description: Respiratory status will improve  Outcome: Ongoing  Note: Lung sounds diminished with an occasional nonproductive cough. Medicated for cough.      Problem: Sensory:  Goal: General experience of comfort will improve  Description: General experience of comfort will improve  Outcome: Ongoing     Problem: Musculor/Skeletal Functional Status  Goal: Highest potential functional level  Outcome: Ongoing  Goal: Absence of falls  Outcome: Ongoing

## 2022-04-08 NOTE — PROGRESS NOTES
Physical Therapy  Facility/Department: [address] [place] [address] PROGRESSIVE CARE  Daily Treatment Note  NAME: Flaca Burdick  : 1930  MRN: [id number]    Date of Service: 2022    Discharge Recommendations:  Patient would benefit from continued therapy after discharge   PT Equipment Recommendations  Equipment Needed: Yes  Mobility Devices: Fayne Oka: Rolling  Other: Pt would benefit from RW due to limited endurance and stability in gait - pt feels more secure and presents wtih more safer gait for home with RW. Assessment   Body structures, Functions, Activity limitations: Decreased ADL status; Decreased functional mobility ; Decreased ROM; Decreased strength;Decreased balance;Decreased endurance  Treatment Diagnosis: Impaired functional mobility 2* shortness of breath  Specific instructions for Next Treatment: progress gait, stairs, RW training, HEP  REQUIRES PT FOLLOW UP: Yes  Activity Tolerance  Activity Tolerance: Patient Tolerated treatment well;Patient limited by endurance     Patient Diagnosis(es): The primary encounter diagnosis was Shortness of breath. Diagnoses of Chest pain, unspecified type, ASCVD (arteriosclerotic cardiovascular disease), Primary hypertension, and Influenza A were also pertinent to this visit. has no past medical history on file. has no past surgical history on file. Restrictions  Restrictions/Precautions  Restrictions/Precautions: General Precautions,Fall Risk,Contact Precautions (DROPLET)  Required Braces or Orthoses?: No  Implants present? : Metal implants (sternal wires)  Position Activity Restriction  Other position/activity restrictions: up as tolerated  Subjective   General  Chart Reviewed: Yes  Additional Pertinent Hx: Influenza  Response To Previous Treatment: Patient with no complaints from previous session.   Family / Caregiver Present: No  Referring Practitioner: Dwayne Sebastian MD  Subjective  Subjective: Patient laying in bed upon arrival; agreeable to therapy   General Comment  Comments: BUZZ Merino approved therapy   Pain Screening  Patient Currently in Pain: Denies  Vital Signs  Patient Currently in Pain: Denies       Orientation  Orientation  Overall Orientation Status: Within Normal Limits  Objective   Bed mobility  Rolling to Left: Supervision  Rolling to Right: Supervision  Supine to Sit: Supervision  Sit to Supine: Supervision  Scooting: Supervision  Transfers  Sit to Stand: Contact guard assistance  Stand to sit: Contact guard assistance  Bed to Chair: Contact guard assistance  Stand Pivot Transfers: Contact guard assistance  Ambulation  Ambulation?: Yes  Ambulation 1  Surface: level tile  Device: Rolling Walker  Assistance: Contact guard assistance  Quality of Gait: normal nemo, no LOB, steady  Distance: 177frt  Stairs/Curb  Stairs?: Yes  Stairs  # Steps : 10  Stairs Height: 4\"  Device: Rolling walker  Assistance: Contact guard assistance  Comment: RW placed over box step for support         Other exercises  Other exercises?: Yes  Other exercises 1: (B) LE seated ex x 20  Other exercises 2: bed mobility x2  Other exercises 3: toilet transfer   Other exercises 4: STS x4     Goals  Short term goals  Time Frame for Short term goals: 3-4 days  Short term goal 1: pt to demo IND bed mobility. Short term goal 2: Pt to demo MOD I transfers with good technique using RW. Short term goal 3: Pt to amb 100' with device, MOD I. Short term goal 4: Pt to ascend/descend 2 stairs 1-2 UE support, supervision. Short term goal 5: Pt to demo goood technique for HEP for endurance, balance, and strengthening. Patient Goals   Patient goals :  To go home    Plan    Plan  Times per week: 5-6x/week  Specific instructions for Next Treatment: progress gait, stairs, RW training, HEP  Current Treatment Recommendations: Strengthening,Balance Training,Functional Mobility Training,Transfer Training,Endurance Training,Gait Training,Stair training,Equipment Evaluation, Education, & procurement,Patient/Caregiver Education & Training,Safety Education & Training,Home Exercise Program  Safety Devices  Type of devices: Gait belt,Call light within reach,Left in bed        04/08/22 1436   PT Individual Minutes   Time In 0945   Time Out 1008   Minutes 23     Electronically signed by Armaan Hernadez PTA on 4/8/22 at 2:41 PM EDT

## 2022-05-04 PROBLEM — J10.1 INFLUENZA A: Status: RESOLVED | Noted: 2022-04-04 | Resolved: 2022-05-04

## 2022-07-11 ENCOUNTER — HOSPITAL ENCOUNTER (OUTPATIENT)
Age: 87
Setting detail: SPECIMEN
Discharge: HOME | End: 2022-07-11

## 2022-07-12 LAB
CULTURE: ABNORMAL
SPECIMEN DESCRIPTION: ABNORMAL

## 2024-09-19 ENCOUNTER — HOSPITAL ENCOUNTER (INPATIENT)
Age: 89
LOS: 7 days | Discharge: NURSING, REHAB OR LONG-TERM CARE | End: 2024-09-27
Attending: EMERGENCY MEDICINE | Admitting: INTERNAL MEDICINE
Payer: MEDICARE

## 2024-09-19 ENCOUNTER — APPOINTMENT (OUTPATIENT)
Dept: GENERAL RADIOLOGY | Age: 89
End: 2024-09-19
Payer: MEDICARE

## 2024-09-19 DIAGNOSIS — R79.89 TROPONIN LEVEL ELEVATED: ICD-10-CM

## 2024-09-19 DIAGNOSIS — I50.9 ACUTE ON CHRONIC CONGESTIVE HEART FAILURE, UNSPECIFIED HEART FAILURE TYPE (HCC): Primary | ICD-10-CM

## 2024-09-19 DIAGNOSIS — I21.4 NSTEMI (NON-ST ELEVATED MYOCARDIAL INFARCTION) (HCC): ICD-10-CM

## 2024-09-19 LAB
BUN BLD-MCNC: 22 MG/DL (ref 8–26)
CA-I BLD-SCNC: 1.24 MMOL/L (ref 1.15–1.33)
CHLORIDE BLD-SCNC: 103 MMOL/L (ref 98–107)
CO2 BLD CALC-SCNC: 23 MMOL/L (ref 22–30)
EGFR, POC: 52 ML/MIN/1.73M2
GLUCOSE BLD-MCNC: 329 MG/DL (ref 74–100)
HCO3 VENOUS: 22.8 MMOL/L (ref 22–29)
HCT VFR BLD AUTO: 32 % (ref 36–46)
LACTIC ACID, WHOLE BLOOD: 2.7 MMOL/L (ref 0.7–2.1)
NEGATIVE BASE EXCESS, VEN: 3.4 MMOL/L (ref 0–2)
O2 SAT, VEN: 78.3 % (ref 60–85)
PCO2 VENOUS: 45.3 MM HG (ref 41–51)
PH VENOUS: 7.31 (ref 7.32–7.43)
PO2 VENOUS: 46.9 MM HG (ref 30–50)
POC ANION GAP: 11 MMOL/L (ref 7–16)
POC CREATININE: 1 MG/DL (ref 0.51–1.19)
POC HEMOGLOBIN (CALC): 10.9 G/DL (ref 12–16)
POC LACTIC ACID: 3.2 MMOL/L (ref 0.56–1.39)
POTASSIUM BLD-SCNC: 4.2 MMOL/L (ref 3.5–4.5)
SODIUM BLD-SCNC: 136 MMOL/L (ref 138–146)

## 2024-09-19 PROCEDURE — 99285 EMERGENCY DEPT VISIT HI MDM: CPT

## 2024-09-19 PROCEDURE — 94761 N-INVAS EAR/PLS OXIMETRY MLT: CPT

## 2024-09-19 PROCEDURE — 82565 ASSAY OF CREATININE: CPT

## 2024-09-19 PROCEDURE — 80051 ELECTROLYTE PANEL: CPT

## 2024-09-19 PROCEDURE — 85025 COMPLETE CBC W/AUTO DIFF WBC: CPT

## 2024-09-19 PROCEDURE — 80053 COMPREHEN METABOLIC PANEL: CPT

## 2024-09-19 PROCEDURE — 74018 RADEX ABDOMEN 1 VIEW: CPT

## 2024-09-19 PROCEDURE — 82947 ASSAY GLUCOSE BLOOD QUANT: CPT

## 2024-09-19 PROCEDURE — 82803 BLOOD GASES ANY COMBINATION: CPT

## 2024-09-19 PROCEDURE — 94660 CPAP INITIATION&MGMT: CPT

## 2024-09-19 PROCEDURE — 83690 ASSAY OF LIPASE: CPT

## 2024-09-19 PROCEDURE — 93005 ELECTROCARDIOGRAM TRACING: CPT | Performed by: INTERNAL MEDICINE

## 2024-09-19 PROCEDURE — 85014 HEMATOCRIT: CPT

## 2024-09-19 PROCEDURE — 83880 ASSAY OF NATRIURETIC PEPTIDE: CPT

## 2024-09-19 PROCEDURE — 82330 ASSAY OF CALCIUM: CPT

## 2024-09-19 PROCEDURE — 93005 ELECTROCARDIOGRAM TRACING: CPT

## 2024-09-19 PROCEDURE — 83605 ASSAY OF LACTIC ACID: CPT

## 2024-09-19 PROCEDURE — 84484 ASSAY OF TROPONIN QUANT: CPT

## 2024-09-19 PROCEDURE — 5A09457 ASSISTANCE WITH RESPIRATORY VENTILATION, 24-96 CONSECUTIVE HOURS, CONTINUOUS POSITIVE AIRWAY PRESSURE: ICD-10-PCS | Performed by: EMERGENCY MEDICINE

## 2024-09-19 PROCEDURE — 6360000002 HC RX W HCPCS

## 2024-09-19 PROCEDURE — 71045 X-RAY EXAM CHEST 1 VIEW: CPT

## 2024-09-19 PROCEDURE — 84520 ASSAY OF UREA NITROGEN: CPT

## 2024-09-19 PROCEDURE — 96365 THER/PROPH/DIAG IV INF INIT: CPT

## 2024-09-19 PROCEDURE — 2700000000 HC OXYGEN THERAPY PER DAY

## 2024-09-19 RX ORDER — NITROGLYCERIN 20 MG/100ML
5-200 INJECTION INTRAVENOUS CONTINUOUS
Status: DISCONTINUED | OUTPATIENT
Start: 2024-09-19 | End: 2024-09-21

## 2024-09-19 RX ORDER — NITROGLYCERIN 20 MG/100ML
INJECTION INTRAVENOUS
Status: COMPLETED
Start: 2024-09-19 | End: 2024-09-19

## 2024-09-19 RX ORDER — ONDANSETRON 2 MG/ML
4 INJECTION INTRAMUSCULAR; INTRAVENOUS ONCE
Status: DISCONTINUED | OUTPATIENT
Start: 2024-09-19 | End: 2024-09-20

## 2024-09-19 RX ADMIN — NITROGLYCERIN 50 MCG/MIN: 20 INJECTION INTRAVENOUS at 23:15

## 2024-09-19 ASSESSMENT — PAIN SCALES - GENERAL: PAINLEVEL_OUTOF10: 4

## 2024-09-19 ASSESSMENT — PAIN DESCRIPTION - LOCATION: LOCATION: CHEST

## 2024-09-20 ENCOUNTER — APPOINTMENT (OUTPATIENT)
Dept: GENERAL RADIOLOGY | Age: 89
End: 2024-09-20
Payer: MEDICARE

## 2024-09-20 ENCOUNTER — APPOINTMENT (OUTPATIENT)
Dept: CT IMAGING | Age: 89
End: 2024-09-20
Payer: MEDICARE

## 2024-09-20 PROBLEM — I50.9 ACUTE ON CHRONIC CONGESTIVE HEART FAILURE (HCC): Status: ACTIVE | Noted: 2024-09-20

## 2024-09-20 PROBLEM — I21.4 NSTEMI (NON-ST ELEVATED MYOCARDIAL INFARCTION) (HCC): Status: ACTIVE | Noted: 2024-09-20

## 2024-09-20 LAB
ALBUMIN SERPL-MCNC: 3.7 G/DL (ref 3.5–5.2)
ALBUMIN/GLOB SERPL: 1 {RATIO} (ref 1–2.5)
ALP SERPL-CCNC: 52 U/L (ref 35–104)
ALT SERPL-CCNC: 11 U/L (ref 10–35)
ANION GAP SERPL CALCULATED.3IONS-SCNC: 13 MMOL/L (ref 9–16)
ANION GAP SERPL CALCULATED.3IONS-SCNC: 16 MMOL/L (ref 9–16)
ANTI-XA UNFRAC HEPARIN: 0.12 IU/L
ANTI-XA UNFRAC HEPARIN: 0.18 IU/L
ANTI-XA UNFRAC HEPARIN: 0.39 IU/L
ANTI-XA UNFRAC HEPARIN: <0.1 IU/L
AST SERPL-CCNC: 27 U/L (ref 10–35)
BASOPHILS # BLD: 0 K/UL (ref 0–0.2)
BASOPHILS # BLD: 0 K/UL (ref 0–0.2)
BASOPHILS NFR BLD: 0 % (ref 0–2)
BASOPHILS NFR BLD: 0 % (ref 0–2)
BILIRUB SERPL-MCNC: 0.4 MG/DL (ref 0–1.2)
BNP SERPL-MCNC: ABNORMAL PG/ML (ref 0–300)
BUN SERPL-MCNC: 21 MG/DL (ref 8–23)
BUN SERPL-MCNC: 22 MG/DL (ref 8–23)
CALCIUM SERPL-MCNC: 8.8 MG/DL (ref 8.6–10.4)
CALCIUM SERPL-MCNC: 9 MG/DL (ref 8.6–10.4)
CHLORIDE SERPL-SCNC: 100 MMOL/L (ref 98–107)
CHLORIDE SERPL-SCNC: 99 MMOL/L (ref 98–107)
CO2 SERPL-SCNC: 19 MMOL/L (ref 20–31)
CO2 SERPL-SCNC: 22 MMOL/L (ref 20–31)
CREAT SERPL-MCNC: 1.2 MG/DL (ref 0.5–0.9)
CREAT SERPL-MCNC: 1.3 MG/DL (ref 0.5–0.9)
ECHO BSA: 1.78 M2
EOSINOPHIL # BLD: 0 K/UL (ref 0–0.44)
EOSINOPHIL # BLD: 0 K/UL (ref 0–0.44)
EOSINOPHILS RELATIVE PERCENT: 0 % (ref 1–4)
EOSINOPHILS RELATIVE PERCENT: 0 % (ref 1–4)
ERYTHROCYTE [DISTWIDTH] IN BLOOD BY AUTOMATED COUNT: 14.6 % (ref 11.8–14.4)
ERYTHROCYTE [DISTWIDTH] IN BLOOD BY AUTOMATED COUNT: 14.8 % (ref 11.8–14.4)
ERYTHROCYTE [DISTWIDTH] IN BLOOD BY AUTOMATED COUNT: 15 % (ref 11.8–14.4)
GFR, ESTIMATED: 38 ML/MIN/1.73M2
GFR, ESTIMATED: 41 ML/MIN/1.73M2
GLUCOSE BLD-MCNC: 154 MG/DL (ref 65–105)
GLUCOSE BLD-MCNC: 169 MG/DL (ref 65–105)
GLUCOSE BLD-MCNC: 171 MG/DL (ref 65–105)
GLUCOSE BLD-MCNC: 198 MG/DL (ref 65–105)
GLUCOSE BLD-MCNC: 260 MG/DL (ref 65–105)
GLUCOSE SERPL-MCNC: 166 MG/DL (ref 74–99)
GLUCOSE SERPL-MCNC: 321 MG/DL (ref 74–99)
HCT VFR BLD AUTO: 26 % (ref 36.3–47.1)
HCT VFR BLD AUTO: 27.5 % (ref 36.3–47.1)
HCT VFR BLD AUTO: 29.6 % (ref 36.3–47.1)
HGB BLD-MCNC: 8.4 G/DL (ref 11.9–15.1)
HGB BLD-MCNC: 8.9 G/DL (ref 11.9–15.1)
HGB BLD-MCNC: 9.7 G/DL (ref 11.9–15.1)
IMM GRANULOCYTES # BLD AUTO: 0 K/UL (ref 0–0.3)
IMM GRANULOCYTES # BLD AUTO: 0 K/UL (ref 0–0.3)
IMM GRANULOCYTES NFR BLD: 0 %
IMM GRANULOCYTES NFR BLD: 0 %
INR PPP: 1.2
LIPASE SERPL-CCNC: 19 U/L (ref 13–60)
LYMPHOCYTES NFR BLD: 1.26 K/UL (ref 1.1–3.7)
LYMPHOCYTES NFR BLD: 1.36 K/UL (ref 1.1–3.7)
LYMPHOCYTES RELATIVE PERCENT: 20 % (ref 24–43)
LYMPHOCYTES RELATIVE PERCENT: 20 % (ref 24–43)
MAGNESIUM SERPL-MCNC: 1.9 MG/DL (ref 1.7–2.3)
MCH RBC QN AUTO: 39.3 PG (ref 25.2–33.5)
MCH RBC QN AUTO: 39.9 PG (ref 25.2–33.5)
MCH RBC QN AUTO: 40.3 PG (ref 25.2–33.5)
MCHC RBC AUTO-ENTMCNC: 32.3 G/DL (ref 28.4–34.8)
MCHC RBC AUTO-ENTMCNC: 32.4 G/DL (ref 28.4–34.8)
MCHC RBC AUTO-ENTMCNC: 32.8 G/DL (ref 28.4–34.8)
MCV RBC AUTO: 121.5 FL (ref 82.6–102.9)
MCV RBC AUTO: 121.8 FL (ref 82.6–102.9)
MCV RBC AUTO: 124.4 FL (ref 82.6–102.9)
MONOCYTES NFR BLD: 0.2 K/UL (ref 0.1–1.2)
MONOCYTES NFR BLD: 0.25 K/UL (ref 0.1–1.2)
MONOCYTES NFR BLD: 3 % (ref 3–12)
MONOCYTES NFR BLD: 4 % (ref 3–12)
MORPHOLOGY: ABNORMAL
MRSA, DNA, NASAL: NEGATIVE
NEUTROPHILS NFR BLD: 76 % (ref 36–65)
NEUTROPHILS NFR BLD: 77 % (ref 36–65)
NEUTS SEG NFR BLD: 4.79 K/UL (ref 1.5–8.1)
NEUTS SEG NFR BLD: 5.24 K/UL (ref 1.5–8.1)
NRBC BLD-RTO: 0 PER 100 WBC
PARTIAL THROMBOPLASTIN TIME: 28.8 SEC (ref 23–36.5)
PLATELET # BLD AUTO: 141 K/UL (ref 138–453)
PLATELET # BLD AUTO: 158 K/UL (ref 138–453)
PLATELET # BLD AUTO: 163 K/UL (ref 138–453)
PMV BLD AUTO: 11.3 FL (ref 8.1–13.5)
PMV BLD AUTO: 11.5 FL (ref 8.1–13.5)
PMV BLD AUTO: 11.5 FL (ref 8.1–13.5)
POTASSIUM SERPL-SCNC: 4.4 MMOL/L (ref 3.7–5.3)
POTASSIUM SERPL-SCNC: 4.8 MMOL/L (ref 3.7–5.3)
PROT SERPL-MCNC: 7.1 G/DL (ref 6.6–8.7)
PROTHROMBIN TIME: 14.7 SEC (ref 11.7–14.9)
RBC # BLD AUTO: 2.14 M/UL (ref 3.95–5.11)
RBC # BLD AUTO: 2.21 M/UL (ref 3.95–5.11)
RBC # BLD AUTO: 2.43 M/UL (ref 3.95–5.11)
SODIUM SERPL-SCNC: 134 MMOL/L (ref 136–145)
SODIUM SERPL-SCNC: 135 MMOL/L (ref 136–145)
SPECIMEN DESCRIPTION: NORMAL
TROPONIN I SERPL HS-MCNC: 123 NG/L (ref 0–14)
TROPONIN I SERPL HS-MCNC: 1592 NG/L (ref 0–14)
TROPONIN I SERPL HS-MCNC: 1611 NG/L (ref 0–14)
TROPONIN I SERPL HS-MCNC: 292 NG/L (ref 0–14)
TROPONIN I SERPL HS-MCNC: 997 NG/L (ref 0–14)
TSH SERPL DL<=0.05 MIU/L-ACNC: 1.16 UIU/ML (ref 0.27–4.2)
WBC OTHER # BLD: 6.3 K/UL (ref 3.5–11.3)
WBC OTHER # BLD: 6.4 K/UL (ref 3.5–11.3)
WBC OTHER # BLD: 6.8 K/UL (ref 3.5–11.3)

## 2024-09-20 PROCEDURE — 94761 N-INVAS EAR/PLS OXIMETRY MLT: CPT

## 2024-09-20 PROCEDURE — 36415 COLL VENOUS BLD VENIPUNCTURE: CPT

## 2024-09-20 PROCEDURE — B2131ZZ FLUOROSCOPY OF MULTIPLE CORONARY ARTERY BYPASS GRAFTS USING LOW OSMOLAR CONTRAST: ICD-10-PCS | Performed by: STUDENT IN AN ORGANIZED HEALTH CARE EDUCATION/TRAINING PROGRAM

## 2024-09-20 PROCEDURE — 2500000003 HC RX 250 WO HCPCS: Performed by: STUDENT IN AN ORGANIZED HEALTH CARE EDUCATION/TRAINING PROGRAM

## 2024-09-20 PROCEDURE — 96375 TX/PRO/DX INJ NEW DRUG ADDON: CPT

## 2024-09-20 PROCEDURE — 6360000004 HC RX CONTRAST MEDICATION: Performed by: STUDENT IN AN ORGANIZED HEALTH CARE EDUCATION/TRAINING PROGRAM

## 2024-09-20 PROCEDURE — 6360000002 HC RX W HCPCS

## 2024-09-20 PROCEDURE — 6370000000 HC RX 637 (ALT 250 FOR IP)

## 2024-09-20 PROCEDURE — 6370000000 HC RX 637 (ALT 250 FOR IP): Performed by: STUDENT IN AN ORGANIZED HEALTH CARE EDUCATION/TRAINING PROGRAM

## 2024-09-20 PROCEDURE — 71045 X-RAY EXAM CHEST 1 VIEW: CPT

## 2024-09-20 PROCEDURE — 84484 ASSAY OF TROPONIN QUANT: CPT

## 2024-09-20 PROCEDURE — 2580000003 HC RX 258

## 2024-09-20 PROCEDURE — 84443 ASSAY THYROID STIM HORMONE: CPT

## 2024-09-20 PROCEDURE — 85027 COMPLETE CBC AUTOMATED: CPT

## 2024-09-20 PROCEDURE — 82947 ASSAY GLUCOSE BLOOD QUANT: CPT

## 2024-09-20 PROCEDURE — 74177 CT ABD & PELVIS W/CONTRAST: CPT

## 2024-09-20 PROCEDURE — B2181ZZ FLUOROSCOPY OF LEFT INTERNAL MAMMARY BYPASS GRAFT USING LOW OSMOLAR CONTRAST: ICD-10-PCS | Performed by: STUDENT IN AN ORGANIZED HEALTH CARE EDUCATION/TRAINING PROGRAM

## 2024-09-20 PROCEDURE — 2580000003 HC RX 258: Performed by: STUDENT IN AN ORGANIZED HEALTH CARE EDUCATION/TRAINING PROGRAM

## 2024-09-20 PROCEDURE — 87641 MR-STAPH DNA AMP PROBE: CPT

## 2024-09-20 PROCEDURE — 6360000002 HC RX W HCPCS: Performed by: STUDENT IN AN ORGANIZED HEALTH CARE EDUCATION/TRAINING PROGRAM

## 2024-09-20 PROCEDURE — 85610 PROTHROMBIN TIME: CPT

## 2024-09-20 PROCEDURE — B2111ZZ FLUOROSCOPY OF MULTIPLE CORONARY ARTERIES USING LOW OSMOLAR CONTRAST: ICD-10-PCS | Performed by: STUDENT IN AN ORGANIZED HEALTH CARE EDUCATION/TRAINING PROGRAM

## 2024-09-20 PROCEDURE — 6360000004 HC RX CONTRAST MEDICATION

## 2024-09-20 PROCEDURE — 85520 HEPARIN ASSAY: CPT

## 2024-09-20 PROCEDURE — C1760 CLOSURE DEV, VASC: HCPCS | Performed by: STUDENT IN AN ORGANIZED HEALTH CARE EDUCATION/TRAINING PROGRAM

## 2024-09-20 PROCEDURE — 85025 COMPLETE CBC W/AUTO DIFF WBC: CPT

## 2024-09-20 PROCEDURE — 99291 CRITICAL CARE FIRST HOUR: CPT | Performed by: INTERNAL MEDICINE

## 2024-09-20 PROCEDURE — 2709999900 HC NON-CHARGEABLE SUPPLY: Performed by: STUDENT IN AN ORGANIZED HEALTH CARE EDUCATION/TRAINING PROGRAM

## 2024-09-20 PROCEDURE — 99223 1ST HOSP IP/OBS HIGH 75: CPT | Performed by: INTERNAL MEDICINE

## 2024-09-20 PROCEDURE — B2151ZZ FLUOROSCOPY OF LEFT HEART USING LOW OSMOLAR CONTRAST: ICD-10-PCS | Performed by: STUDENT IN AN ORGANIZED HEALTH CARE EDUCATION/TRAINING PROGRAM

## 2024-09-20 PROCEDURE — 80048 BASIC METABOLIC PNL TOTAL CA: CPT

## 2024-09-20 PROCEDURE — 93459 L HRT ART/GRFT ANGIO: CPT | Performed by: STUDENT IN AN ORGANIZED HEALTH CARE EDUCATION/TRAINING PROGRAM

## 2024-09-20 PROCEDURE — C1769 GUIDE WIRE: HCPCS | Performed by: STUDENT IN AN ORGANIZED HEALTH CARE EDUCATION/TRAINING PROGRAM

## 2024-09-20 PROCEDURE — 99152 MOD SED SAME PHYS/QHP 5/>YRS: CPT | Performed by: STUDENT IN AN ORGANIZED HEALTH CARE EDUCATION/TRAINING PROGRAM

## 2024-09-20 PROCEDURE — C1892 INTRO/SHEATH,FIXED,PEEL-AWAY: HCPCS | Performed by: STUDENT IN AN ORGANIZED HEALTH CARE EDUCATION/TRAINING PROGRAM

## 2024-09-20 PROCEDURE — C1894 INTRO/SHEATH, NON-LASER: HCPCS | Performed by: STUDENT IN AN ORGANIZED HEALTH CARE EDUCATION/TRAINING PROGRAM

## 2024-09-20 PROCEDURE — 4A023N7 MEASUREMENT OF CARDIAC SAMPLING AND PRESSURE, LEFT HEART, PERCUTANEOUS APPROACH: ICD-10-PCS | Performed by: STUDENT IN AN ORGANIZED HEALTH CARE EDUCATION/TRAINING PROGRAM

## 2024-09-20 PROCEDURE — 2700000000 HC OXYGEN THERAPY PER DAY

## 2024-09-20 PROCEDURE — 83735 ASSAY OF MAGNESIUM: CPT

## 2024-09-20 PROCEDURE — 71260 CT THORAX DX C+: CPT

## 2024-09-20 PROCEDURE — 99153 MOD SED SAME PHYS/QHP EA: CPT | Performed by: STUDENT IN AN ORGANIZED HEALTH CARE EDUCATION/TRAINING PROGRAM

## 2024-09-20 PROCEDURE — 2000000000 HC ICU R&B

## 2024-09-20 PROCEDURE — 85730 THROMBOPLASTIN TIME PARTIAL: CPT

## 2024-09-20 PROCEDURE — 93455 CORONARY ART/GRFT ANGIO S&I: CPT | Performed by: STUDENT IN AN ORGANIZED HEALTH CARE EDUCATION/TRAINING PROGRAM

## 2024-09-20 RX ORDER — IOPAMIDOL 755 MG/ML
INJECTION, SOLUTION INTRAVASCULAR PRN
Status: DISCONTINUED | OUTPATIENT
Start: 2024-09-20 | End: 2024-09-20 | Stop reason: HOSPADM

## 2024-09-20 RX ORDER — ONDANSETRON 4 MG/1
4 TABLET, ORALLY DISINTEGRATING ORAL EVERY 8 HOURS PRN
Status: DISCONTINUED | OUTPATIENT
Start: 2024-09-20 | End: 2024-09-27 | Stop reason: HOSPADM

## 2024-09-20 RX ORDER — MIDAZOLAM HYDROCHLORIDE 1 MG/ML
INJECTION INTRAMUSCULAR; INTRAVENOUS PRN
Status: DISCONTINUED | OUTPATIENT
Start: 2024-09-20 | End: 2024-09-20 | Stop reason: HOSPADM

## 2024-09-20 RX ORDER — INSULIN LISPRO 100 [IU]/ML
0-8 INJECTION, SOLUTION INTRAVENOUS; SUBCUTANEOUS EVERY 4 HOURS
Status: DISCONTINUED | OUTPATIENT
Start: 2024-09-20 | End: 2024-09-21

## 2024-09-20 RX ORDER — ACETAMINOPHEN 325 MG/1
650 TABLET ORAL EVERY 6 HOURS PRN
Status: DISCONTINUED | OUTPATIENT
Start: 2024-09-20 | End: 2024-09-23

## 2024-09-20 RX ORDER — IOPAMIDOL 755 MG/ML
75 INJECTION, SOLUTION INTRAVASCULAR
Status: COMPLETED | OUTPATIENT
Start: 2024-09-20 | End: 2024-09-20

## 2024-09-20 RX ORDER — SODIUM CHLORIDE 9 MG/ML
INJECTION, SOLUTION INTRAVENOUS PRN
Status: DISCONTINUED | OUTPATIENT
Start: 2024-09-20 | End: 2024-09-27 | Stop reason: HOSPADM

## 2024-09-20 RX ORDER — FENTANYL CITRATE 50 UG/ML
INJECTION, SOLUTION INTRAMUSCULAR; INTRAVENOUS PRN
Status: DISCONTINUED | OUTPATIENT
Start: 2024-09-20 | End: 2024-09-20 | Stop reason: HOSPADM

## 2024-09-20 RX ORDER — METOPROLOL SUCCINATE 50 MG/1
50 TABLET, EXTENDED RELEASE ORAL DAILY
Status: DISCONTINUED | OUTPATIENT
Start: 2024-09-20 | End: 2024-09-24

## 2024-09-20 RX ORDER — HEPARIN SODIUM 1000 [USP'U]/ML
4000 INJECTION, SOLUTION INTRAVENOUS; SUBCUTANEOUS ONCE
Status: COMPLETED | OUTPATIENT
Start: 2024-09-20 | End: 2024-09-20

## 2024-09-20 RX ORDER — HEPARIN SODIUM 1000 [USP'U]/ML
2000 INJECTION, SOLUTION INTRAVENOUS; SUBCUTANEOUS PRN
Status: DISCONTINUED | OUTPATIENT
Start: 2024-09-20 | End: 2024-09-23

## 2024-09-20 RX ORDER — SODIUM CHLORIDE 0.9 % (FLUSH) 0.9 %
5-40 SYRINGE (ML) INJECTION PRN
Status: DISCONTINUED | OUTPATIENT
Start: 2024-09-20 | End: 2024-09-27 | Stop reason: HOSPADM

## 2024-09-20 RX ORDER — ATORVASTATIN CALCIUM 10 MG/1
10 TABLET, FILM COATED ORAL DAILY
Status: DISCONTINUED | OUTPATIENT
Start: 2024-09-20 | End: 2024-09-20

## 2024-09-20 RX ORDER — HEPARIN SODIUM 10000 [USP'U]/100ML
5-30 INJECTION, SOLUTION INTRAVENOUS CONTINUOUS
Status: DISCONTINUED | OUTPATIENT
Start: 2024-09-20 | End: 2024-09-23

## 2024-09-20 RX ORDER — ISOSORBIDE MONONITRATE 60 MG/1
60 TABLET, EXTENDED RELEASE ORAL DAILY
Status: DISCONTINUED | OUTPATIENT
Start: 2024-09-20 | End: 2024-09-23

## 2024-09-20 RX ORDER — SODIUM CHLORIDE 0.9 % (FLUSH) 0.9 %
5-40 SYRINGE (ML) INJECTION EVERY 12 HOURS SCHEDULED
Status: DISCONTINUED | OUTPATIENT
Start: 2024-09-20 | End: 2024-09-27 | Stop reason: HOSPADM

## 2024-09-20 RX ORDER — POTASSIUM CHLORIDE 7.45 MG/ML
10 INJECTION INTRAVENOUS PRN
Status: DISCONTINUED | OUTPATIENT
Start: 2024-09-20 | End: 2024-09-23

## 2024-09-20 RX ORDER — HEPARIN SODIUM 1000 [USP'U]/ML
4000 INJECTION, SOLUTION INTRAVENOUS; SUBCUTANEOUS PRN
Status: DISCONTINUED | OUTPATIENT
Start: 2024-09-20 | End: 2024-09-23

## 2024-09-20 RX ORDER — ACETAMINOPHEN 325 MG/1
650 TABLET ORAL EVERY 4 HOURS PRN
Status: DISCONTINUED | OUTPATIENT
Start: 2024-09-20 | End: 2024-09-27 | Stop reason: HOSPADM

## 2024-09-20 RX ORDER — ASPIRIN 81 MG/1
81 TABLET, CHEWABLE ORAL DAILY
Status: DISCONTINUED | OUTPATIENT
Start: 2024-09-21 | End: 2024-09-27 | Stop reason: HOSPADM

## 2024-09-20 RX ORDER — POLYETHYLENE GLYCOL 3350 17 G/17G
17 POWDER, FOR SOLUTION ORAL DAILY PRN
Status: DISCONTINUED | OUTPATIENT
Start: 2024-09-20 | End: 2024-09-26

## 2024-09-20 RX ORDER — CLOPIDOGREL BISULFATE 75 MG/1
75 TABLET ORAL DAILY
Status: DISCONTINUED | OUTPATIENT
Start: 2024-09-20 | End: 2024-09-27 | Stop reason: HOSPADM

## 2024-09-20 RX ORDER — ACETAMINOPHEN 650 MG/1
650 SUPPOSITORY RECTAL EVERY 6 HOURS PRN
Status: DISCONTINUED | OUTPATIENT
Start: 2024-09-20 | End: 2024-09-23

## 2024-09-20 RX ORDER — ATORVASTATIN CALCIUM 40 MG/1
40 TABLET, FILM COATED ORAL DAILY
Status: DISCONTINUED | OUTPATIENT
Start: 2024-09-20 | End: 2024-09-27 | Stop reason: HOSPADM

## 2024-09-20 RX ORDER — DEXTROSE MONOHYDRATE 100 MG/ML
INJECTION, SOLUTION INTRAVENOUS CONTINUOUS PRN
Status: DISCONTINUED | OUTPATIENT
Start: 2024-09-20 | End: 2024-09-27 | Stop reason: HOSPADM

## 2024-09-20 RX ORDER — METOPROLOL TARTRATE 25 MG/1
25 TABLET, FILM COATED ORAL 4 TIMES DAILY
Status: DISCONTINUED | OUTPATIENT
Start: 2024-09-20 | End: 2024-09-21

## 2024-09-20 RX ORDER — INSULIN LISPRO 100 [IU]/ML
5 INJECTION, SOLUTION INTRAVENOUS; SUBCUTANEOUS ONCE
Status: DISCONTINUED | OUTPATIENT
Start: 2024-09-20 | End: 2024-09-20

## 2024-09-20 RX ORDER — FENTANYL CITRATE 50 UG/ML
25 INJECTION, SOLUTION INTRAMUSCULAR; INTRAVENOUS
Status: DISCONTINUED | OUTPATIENT
Start: 2024-09-20 | End: 2024-09-24

## 2024-09-20 RX ORDER — ONDANSETRON 2 MG/ML
4 INJECTION INTRAMUSCULAR; INTRAVENOUS EVERY 6 HOURS PRN
Status: DISCONTINUED | OUTPATIENT
Start: 2024-09-20 | End: 2024-09-27 | Stop reason: HOSPADM

## 2024-09-20 RX ORDER — ASPIRIN 81 MG/1
81 TABLET ORAL DAILY
Status: DISCONTINUED | OUTPATIENT
Start: 2024-09-20 | End: 2024-09-20

## 2024-09-20 RX ORDER — POTASSIUM CHLORIDE 29.8 MG/ML
20 INJECTION INTRAVENOUS PRN
Status: DISCONTINUED | OUTPATIENT
Start: 2024-09-20 | End: 2024-09-23

## 2024-09-20 RX ORDER — MAGNESIUM SULFATE IN WATER 40 MG/ML
2000 INJECTION, SOLUTION INTRAVENOUS PRN
Status: DISCONTINUED | OUTPATIENT
Start: 2024-09-20 | End: 2024-09-23 | Stop reason: SDUPTHER

## 2024-09-20 RX ORDER — GLUCAGON 1 MG/ML
1 KIT INJECTION PRN
Status: DISCONTINUED | OUTPATIENT
Start: 2024-09-20 | End: 2024-09-27 | Stop reason: HOSPADM

## 2024-09-20 RX ADMIN — ISOSORBIDE MONONITRATE 60 MG: 60 TABLET, EXTENDED RELEASE ORAL at 08:15

## 2024-09-20 RX ADMIN — SODIUM CHLORIDE, PRESERVATIVE FREE 10 ML: 5 INJECTION INTRAVENOUS at 20:01

## 2024-09-20 RX ADMIN — METOPROLOL TARTRATE 25 MG: 25 TABLET, FILM COATED ORAL at 20:01

## 2024-09-20 RX ADMIN — HEPARIN SODIUM 2000 UNITS: 1000 INJECTION INTRAVENOUS; SUBCUTANEOUS at 14:47

## 2024-09-20 RX ADMIN — LEVOTHYROXINE SODIUM 125 MCG: 75 TABLET ORAL at 08:17

## 2024-09-20 RX ADMIN — IOPAMIDOL 75 ML: 755 INJECTION, SOLUTION INTRAVENOUS at 00:22

## 2024-09-20 RX ADMIN — SODIUM CHLORIDE, PRESERVATIVE FREE 10 ML: 5 INJECTION INTRAVENOUS at 08:28

## 2024-09-20 RX ADMIN — HEPARIN SODIUM 12 UNITS/KG/HR: 10000 INJECTION, SOLUTION INTRAVENOUS at 23:14

## 2024-09-20 RX ADMIN — PIPERACILLIN AND TAZOBACTAM 4500 MG: 4; .5 INJECTION, POWDER, LYOPHILIZED, FOR SOLUTION INTRAVENOUS at 01:51

## 2024-09-20 RX ADMIN — CLOPIDOGREL BISULFATE 75 MG: 75 TABLET ORAL at 20:00

## 2024-09-20 RX ADMIN — ATORVASTATIN CALCIUM 40 MG: 40 TABLET, FILM COATED ORAL at 20:00

## 2024-09-20 RX ADMIN — ACETAMINOPHEN 650 MG: 325 TABLET ORAL at 17:31

## 2024-09-20 RX ADMIN — ASPIRIN 81 MG: 81 TABLET, COATED ORAL at 08:17

## 2024-09-20 RX ADMIN — INSULIN LISPRO 2 UNITS: 100 INJECTION, SOLUTION INTRAVENOUS; SUBCUTANEOUS at 23:14

## 2024-09-20 RX ADMIN — INSULIN LISPRO 4 UNITS: 100 INJECTION, SOLUTION INTRAVENOUS; SUBCUTANEOUS at 03:00

## 2024-09-20 RX ADMIN — HEPARIN SODIUM 12 UNITS/KG/HR: 10000 INJECTION, SOLUTION INTRAVENOUS at 00:46

## 2024-09-20 RX ADMIN — HEPARIN SODIUM 4000 UNITS: 1000 INJECTION INTRAVENOUS; SUBCUTANEOUS at 00:42

## 2024-09-20 RX ADMIN — Medication 5 MG: at 21:17

## 2024-09-20 ASSESSMENT — PAIN DESCRIPTION - DESCRIPTORS: DESCRIPTORS: DISCOMFORT;PRESSURE

## 2024-09-20 ASSESSMENT — PAIN DESCRIPTION - LOCATION: LOCATION: CHEST

## 2024-09-20 ASSESSMENT — PAIN SCALES - GENERAL
PAINLEVEL_OUTOF10: 6
PAINLEVEL_OUTOF10: 0

## 2024-09-20 ASSESSMENT — PAIN DESCRIPTION - ORIENTATION: ORIENTATION: MID

## 2024-09-20 ASSESSMENT — PAIN DESCRIPTION - PAIN TYPE: TYPE: ACUTE PAIN

## 2024-09-20 NOTE — PROGRESS NOTES
I had a long discussion with the patient in person, in presence of the RN taking care of the patient and the patient's two sons. Patient's current clinical condition, laboratory and radiographic findings as well as recommendations of physicians consulted on the case were discussed in detail. All questions and concerns were addressed. After understanding patient's current medical condition, she requested her code status to be made to DNR CCA with no intubation.     Musa Valdovinos MD,   9/20/2024, 3:03 AM

## 2024-09-20 NOTE — ED NOTES
Arrived patient to ED escorted by EMS presents with shortness of breath and chest pain. Patient states that sob has been going on this morning. As per EMS 2 doses of nitro and zofran were given. Painscale of 4/10. Patient has saturation of 85% on 4L/min. RT, Dr. Villalobos and Dr. Renner  at bedside. Patient appears to be diaphoretic, tachypneic. placed patient on BIPAP by RT. Has a history of CHF, hypertension and had CABG done few years ago. Patient does not use any oxygen at home. Walks independently. Hooked to [facility] monitor. EKG done and reviewed by Dr. Villalobos. Bed on lowest position. Call light provided. Family at bedside

## 2024-09-20 NOTE — ED PROVIDER NOTES
[facility] ED  Emergency Department Encounter  Emergency Medicine Resident     Pt Name:Rossana Alcantara  MRN: [id number]  Birthdate 7/13/1930  Date of evaluation: 9/19/24  PCP:  Modesto Ayala MD  Note Started: 11:52 PM EDT      CHIEF COMPLAINT       Chief Complaint   Patient presents with    Chest Pain    Shortness of Breath       HISTORY OF PRESENT ILLNESS  (Location/Symptom, Timing/Onset, Context/Setting, Quality, Duration, Modifying Factors, Severity.)      Rossana Alcantara is a 89 y.o. female history of CAD status post CABG, hypertension, CHF with EF of 40 to 45%, mitral regurg and tricuspid regurg, pericarditis who presents with sudden onset chest pain, shortness of breath that began this evening.  Patient states she was at home with her children, laughing when she began having significant pressure across her chest.  Patient was given aspirin and nitroglycerin by EMS with some relief of symptoms.  She was started on a liter of fluid by EMS.  Upon arrival she is in significant respiratory distress, diffuse crackles throughout her lungs, unable to provide full history.  Does state that she has been taking her medications as prescribed and does take diuretic at home.    Patient states that she has chronic issues with her bowels and has been having diarrhea for a while.  Did have a loose bowel movement today.    EF 40-45%    PAST MEDICAL / SURGICAL / SOCIAL / FAMILY HISTORY      has a past medical history of Accelerating angina (HCC), Arthritis, CAD (coronary artery disease), Carotid bruit, DM (diabetes mellitus) ([facility]), HTN (hypertension), Hypothyroid, Mitral regurgitation, Paroxysmal A-fib (HCC), and Pericarditis.       has a past surgical history that includes Cardiac catheterization; Cholecystectomy; Hysterectomy; shoulder surgery; Tonsillectomy; Breast lumpectomy; and Coronary artery bypass graft (5/9/2014).      Social History     Socioeconomic History    Marital status:       teens.    Initial EKG at 2303 rate 120, sinus tachycardia with left bundle branch block, EKG with concerning ST changes in lateral leads meeting [facility] criteria for acute ischemia [TD]   2350 Spoke with interventional cardiologist Dr. Casas who is noting some concerns for ischemia on EKG.  Recommending aspirin (given by EMS) and IV heparin if there is no bleeding contraindication.  Stating that they can potentially cath the patient in the morning [TD]   2359 Chest x-ray showing pulmonary edema and mild cardiomegaly [TD]   2359 XR abdomen     IMPRESSION:  Prominent bowel loops gas-filled bowel loops in the upper abdomen suspicious  for ileus or partial obstruction.   [TD]   Fri Sep 20, 2024   0006 Hemoglobin Quant(!): 9.7  Hemoglobin appears at baseline when compared to labs from March of this year at [facility] [TD]   0014 Did speak with patient and family regarding CODE STATUS.  Both patient and the family at bedside say that if the patient's heart were to stop they do not want CPR performed. Patient and family are agreeable with intubation if necessary [TD]   0020 Troponin, High Sensitivity(!!): 123  Heparin gtt ordered [TD]   0020 NT Pro-BNP(!): 15,996  Consistent with CHF exacerbation [TD]   0031 Concern for possible pna on CT chest, vanc/zosyn ordered [TD]   0101 Patient signed out to night resident, awaiting reads of CT scans, likely ICU admission, possible surgery consult [TD]   0125 Troponin, High Sensitivity(!!): 292 [JF]      ED Course User Index  [JF] Holly Ramon DO  [TD] Adamaris Renner DO       CONSULTS:  PHARMACY TO DOSE VANCOMYCIN    CRITICAL CARE:  There was significant risk of life threatening deterioration of patient's condition requiring my direct management. Critical care time 20 minutes, excluding any documented procedures.    FINAL IMPRESSION      1. Acute on chronic congestive heart failure, unspecified heart failure type (HCC)          DISPOSITION / PLAN     DISPOSITION

## 2024-09-20 NOTE — ED PROVIDER NOTES
[facility]     Emergency Department     Faculty Note/ Attestation      Pt Name: Rossana Alcantara                                       MRN: [id number]  Birthdate 7/13/1930  Date of evaluation: 9/19/2024    Patients PCP:    Modesto Ayala MD    Note Started: 12:11 AM EDT      Attestation  I performed a history and physical examination of the patient and discussed management with the resident. I reviewed the resident’s note and agree with the documented findings and plan of care. Any areas of disagreement are noted on the chart. I was personally present for the key portions of any procedures. I have documented in the chart those procedures where I was not present during the key portions. I have reviewed the emergency nurses triage note. I agree with the chief complaint, past medical history, past surgical history, allergies, medications, social and family history as documented unless otherwise noted below.    For Physician Assistant/ Nurse Practitioner cases/documentation I have personally evaluated this patient and have completed at least one if not all key elements of the E/M (history, physical exam, and MDM). Additional findings are as noted.      Initial Screens:        Mateo Coma Scale  Eye Opening: Spontaneous  Best Verbal Response: Oriented  Best Motor Response: Obeys commands  [place] Coma Scale Score: 15    Vitals:    Vitals:    09/19/24 2346 09/19/24 2349 09/19/24 2350 09/19/24 2355   BP: (!) 161/84  (!) 153/80 (!) 145/75   Pulse: 95 95 93 91   Resp:  29 20 (!) 32   Temp:       TempSrc:       SpO2:  100% 99% 100%   Weight:           CHIEF COMPLAINT       Chief Complaint   Patient presents with    Chest Pain    Shortness of Breath             DIAGNOSTIC RESULTS             RADIOLOGY:   XR CHEST PORTABLE   Final Result   Pulmonary edema and mild cardiomegaly is seen.         XR ABDOMEN (KUB) (SINGLE AP VIEW)   Final Result   Prominent bowel loops gas-filled bowel loops in the

## 2024-09-20 NOTE — PROGRESS NOTES
09/19/24 2349   Settings/Measurements   IPAP (S)  12 cmH20   CPAP/EPAP (S)  6 cmH2O   Insp Rise Time (%) (S)  2 %     Pt comfort

## 2024-09-20 NOTE — ED PROVIDER NOTES
[facility]- [facility]  Emergency Department  Emergency Medicine Resident Turn-Over     Note Started: 12:53 AM EDT    Care of Rossana Alcantara was assumed from Dr. Renner and is being seen for Chest Pain and Shortness of Breath  .  The patient's initial evaluation and plan have been discussed with the prior provider who initially evaluated the patient.     EMERGENCY DEPARTMENT COURSE / MEDICAL DECISION MAKING:       MEDICATIONS GIVEN:  Orders Placed This Encounter   Medications    nitroGLYCERIN 200 mcg/mL in dextrose 5%     Order Specific Question:   Titrate Infusion?     Answer:   Yes     Order Specific Question:   Initial Infusion Dose:     Answer:   5 mcg/min     Order Specific Question:   Goal of Therapy is:     Answer:   SBP less than 160 mmHg     Order Specific Question:   Contact Provider if:     Answer:   SBP less than 90 mmHg    ondansetron (ZOFRAN) injection 4 mg    nitroGLYCERIN 200-5 MCG/ML-% infusion     Jessica Vaughan: cabinet override    heparin (porcine) injection 4,000 Units    heparin (porcine) injection 4,000 Units    heparin (porcine) injection 2,000 Units    heparin 25,000 units in dextrose 5% 250 mL (premix) infusion    iopamidol (ISOVUE-370) 76 % injection 75 mL    piperacillin-tazobactam (ZOSYN) 4,500 mg in sodium chloride 0.9 % 100 mL IVPB ([id number])     Order Specific Question:   Antimicrobial Indications     Answer:   Sepsis of Unknown Etiology     Order Specific Question:   Sepsis duration of therapy     Answer:   7 days    DISCONTD: vancomycin (VANCOCIN) intermittent dosing (placeholder)     Order Specific Question:   Antimicrobial Indications     Answer:   Sepsis of Unknown Etiology     Order Specific Question:   Sepsis duration of therapy     Answer:   7 days    DISCONTD: vancomycin (VANCOCIN) 1,500 mg in sodium chloride 0.9 % 250 mL IVPB ([id number])     Order Specific Question:   Antimicrobial Indications     Answer:   Sepsis of Unknown Etiology     Order Specific Question:      ANTI-XA, UNFRACTIONATED HEPARIN   ANTI-XA, UNFRACTIONATED HEPARIN   TROPONIN   CBC WITH AUTO DIFFERENTIAL   BASIC METABOLIC PANEL   MAGNESIUM   TROPONIN   UREA N (POC)   POCT GLUCOSE       XR CHEST PORTABLE    Result Date: 9/19/2024  EXAMINATION: ONE XRAY VIEW OF THE CHEST 9/19/2024 10:19 pm COMPARISON: None. HISTORY: ORDERING SYSTEM PROVIDED HISTORY: sob, cp TECHNOLOGIST PROVIDED HISTORY: sob, cp Reason for Exam: port upright FINDINGS: There is mild cardiomegaly.  Pulmonary venous congestion is seen.  Perihilar pulmonary consolidation is present.  There is no pleural effusion.  No pneumothorax.  No acute osseous abnormality is seen.     Pulmonary edema and mild cardiomegaly is seen.     XR ABDOMEN (KUB) (SINGLE AP VIEW)    Result Date: 9/19/2024  EXAMINATION: ONE SUPINE XRAY VIEW(S) OF THE ABDOMEN 9/19/2024 10:19 pm COMPARISON: None. HISTORY: ORDERING SYSTEM PROVIDED HISTORY: upright TECHNOLOGIST PROVIDED HISTORY: upright Reason for Exam: port upright FINDINGS: Prominent bowel loops gas-filled bowel loops are seen in the upper abdomen. These findings are suspicious for ileus or partial obstruction.  No pneumoperitoneum is seen.  Pulmonary edema is seen in the visualized lungs. No radiopaque foreign bodies are seen.     Prominent bowel loops gas-filled bowel loops in the upper abdomen suspicious for ileus or partial obstruction.       RECENT VITALS:     Temp: 98.3 °F (36.8 °C),  Pulse: 70, Respirations: 25, BP: 132/73, SpO2: 99 %    This patient is a 89 y.o. Female with PMH CAD, CABG, CHF with EF 40%, HTN presenting with chest pain, shortness of breath that began suddenly while at rest.  Hypoxic for EMS, 85% on room air, no home oxygen at baseline.  Aspirin and nitro given by EMS, mild relief of pain.  Hypertensive upon arrival.  Crackles throughout, hypoxic.  Concern for CHF exacerbation.  Started on nitro drip, on BiPAP.  Patient reports improvement in symptoms.  EKG with left bundle, meets Sgarbossa criteria.

## 2024-09-20 NOTE — PROGRESS NOTES
Spiritual Health History and Assessment/Progress Note  [facility]    Initial Encounter,     Name: Rossana Alcantara MRN: [id number]    Age: 89 y.o.     Sex: female   Language: English   Mandaeism: Christian   NSTEMI (non-ST elevated myocardial infarction) (HCC)     Date: 9/20/2024            Total Time Calculated: (P) 32 min              Spiritual Assessment began in [facility]        Referral/Consult From: Rounding (ED Rounding)   Encounter Overview/Reason: Initial Encounter  Service Provided For: Patient and family together    Narrative:  visited patient per initial rounding visits in the ED. Patient was sitting up in hospital bed, wearing breathing mask, when  visited. Patient's two sons and daughter-in-law were present in room, when  visited. Patient shared that she arrived to the hospital with a level \"ten in chest pain.\" Patient shared that she is feeling much better now. Patient and family were coping well and indicated no needs at time of visit. Per report, patient's code status is \"DNRCCA.\"     Taylor, Belief, Meaning:   Patient has beliefs or practices that help with coping during difficult times  Family/Friends have beliefs or practices that help with coping during difficult times      Importance and Influence:  Patient has spiritual/personal beliefs that influence decisions regarding their health  Family/Friends have spiritual/personal beliefs that influence decisions regarding the patient's health    Community:  Patient feels well-supported. Support system includes: Children  Family/Friends feel well-supported. Support system includes: Extended family    Assessment and Plan of Care:     Patient Interventions include: Affirmed coping skills/support systems  Family/Friends Interventions include: Affirmed coping skills/support systems    Patient Plan of Care: Spiritual Care available upon further referral  Family/Friends Plan of Care: Spiritual Care available  upon further referral    Electronically signed by CONNOR Ronquillo on 9/20/2024 at 7:06 AM

## 2024-09-20 NOTE — PROGRESS NOTES
[facility]   Pharmacy Pharmacokinetic Monitoring Service - Vancomycin     Rossana Alcantara is a 89 y.o. female starting on vancomycin therapy for sepsis. Pharmacy consulted by Dr. Renner for monitoring and adjustment.    Target Concentration: Dosing based on anticipated concentration <15 mg/L due to renal impairment/insufficiency    Additional Antimicrobials: Zosyn    Pertinent Laboratory Values:   Wt Readings from Last 1 Encounters:   09/19/24 67.6 kg (149 lb)     Temp Readings from Last 1 Encounters:   09/19/24 98.3 °F (36.8 °C) (Oral)     CrCl cannot be calculated (Unknown ideal weight.).  Recent Labs     09/19/24  2307 09/19/24  2341 09/20/24  0043   CREATININE 1.0 1.2*  --    BUN  --  21  --    WBC  --  6.8 6.4     Procalcitonin: n/a    Pertinent Cultures:  Culture Date Source Results        MRSA Nasal Swab: not ordered. Order placed by pharmacy.    Plan:  Concentration-guided dosing due to renal impairment/insufficiency  Start vancomycin 1500mg IVPB x 1 dose  Renal labs as indicated   Vancomycin concentration ordered for  @    Pharmacy will continue to monitor patient and adjust therapy as indicated    Thank you for the consult,  Dimitry Recio RPH  9/20/2024 1:19 AM

## 2024-09-20 NOTE — PLAN OF CARE
Problem: Discharge Planning  Goal: Discharge to home or other facility with appropriate resources  Outcome: Progressing  Flowsheets (Taken 9/20/2024 0800)  Discharge to home or other facility with appropriate resources:   Identify barriers to discharge with patient and caregiver   Arrange for needed discharge resources and transportation as appropriate   Identify discharge learning needs (meds, wound care, etc)     Problem: Skin/Tissue Integrity  Goal: Absence of new skin breakdown  Description: 1.  Monitor for areas of redness and/or skin breakdown  2.  Assess vascular access sites hourly  3.  Every 4-6 hours minimum:  Change oxygen saturation probe site  4.  Every 4-6 hours:  If on nasal continuous positive airway pressure, respiratory therapy assess nares and determine need for appliance change or resting period.  Outcome: Progressing     Problem: Safety - Adult  Goal: Free from fall injury  Outcome: Progressing

## 2024-09-20 NOTE — H&P
Critical Care - History and Physical Examination    Patient's name:  Rossana Alcantara  Medical Record Number: [id number]  Patient's account/billing number: [id number]  Patient's YOB: 1930  Age: 89 y.o.  Date of Admission: 9/19/2024 10:57 PM  Reason of ICU admission: NSTEMI with high risk for decompensation, on nitro gtt, BiPAP  Date of History and Physical Examination: 9/20/2024      Primary Care Physician: Modesto Ayala MD  Attending Physician: Dr. Basurto    Code Status: DNR-CCA, NO INTUBATION    Chief complaint: chest pain, fatigue    Reason for ICU admission: NSTEMI with high risk for decompensation, on nitro gtt, BiPAP      History Of Present Illness:   History was obtained from chart review and the patient.      Rossana Alcantara is a 89 y.o. with a history of CAD, status post CABG in 2014, mitral regurgitation, LBBB, HTN, HLD, CHF [EF 40-45%], hypothyroidism, paroxysmal A-fib not on anticoagulation presenting with chest pain and fatigue.  Patient states that the fatigue has been ongoing since waking up this morning.  She states that prior to arrival she noted chest pain and nausea.  She states that she tried to go to bed and ignore the chest pain however she became more nauseous and vomited.  Patient's family called 911, patient was given aspirin and taken to the emergency department.    In the emergency department, patient was found to be tachypneic, diaphoretic.  ECG did show left bundle branch block [old], concern for Sgarbossa criteria.  Cardiology was consulted, recommended trending troponins and admission for cardiac catheterization in the morning.  Patient does follow with cardiology, Dr. Mazariegos.    Given that the patient was meeting SIRS criteria, patient was given 1 dose of Zosyn and vancomycin in the emergency department.    Patient states she is compliant with her medications including her daily aspirin, metoprolol, levothyroxine, and statin.    Labs in the ED show:  Hb  6.4  CT chest showing non specific ground glass opacities  Denies fever, afebrile here, denies productive cough  Given 1 dose of zosyn and vanco - D/C, monitor WBC and fevers  Tmax 36.8C  Antimicrobials: HELD, not indicated    Endo:  glucose controlled - most recent BGL is 260  On MDSSI    MSK:  No active issues    Skin:  No active issues    Prophylaxis:  Heparin gtt for NSTEMI  GI - not indicated    Dispo: Admit to ICU for close monitoring    CODE STATUS: DNR-CCA with NO INTUBATION per discussion with patient in presence of 2 twos and one daughter-in-law      Musa Valdovinos MD   Emergency Medicine PGY-3  Intensive Care Unit  9/20/2024, 2:58 AM       Attending Physician Statement  I have discussed the care of Rossana Alcantara, including pertinent history and exam findings with the resident. I have reviewed the key elements of all parts of the encounter with the resident. I have seen and examined the patient with the resident.  I agree with the assessment and plan and status of the problem list as documented.    I saw the patient during around today, chart reviewed overnight events noted EKG images reviewed and CTA chest and chest x-ray images reviewed.  Patient is very typical history starting this summer with shortness of breath chest tightness chest pain on activities and on walking had been taking nitro the symptoms were getting worse since spring she apparently had a stress test done which per patient she was not able to complete she was seen by cardiology and had been followed by cardiology and per cardiology note they did not want ischemia workup because of her age and comorbidities.  Yesterday hold the patient was having not feeling good fatigue and then she started having acute shortness of breath chest tightness chest pain she took nitro and then she was brought into emergency room in the emergency room chest x-ray was consistent with pulm edema her troponin was elevated in 200 initially cardiology was

## 2024-09-20 NOTE — ED PROVIDER NOTES
Faculty Sign-Out Attestation  Handoff taken on the following patient from prior Attending Physician: Wilfredo  Note Started: 2:06 AM EDT    I was available and discussed any additional care issues that arose and coordinated the management plans with the resident(s) caring for the patient during my duty period. Any areas of disagreement with resident’s documentation of care or procedures are noted on the chart. I was personally present for the key portions of any/all procedures during my duty period. I have documented in the chart those procedures where I was not present during the key portions.    Sob / bipap, icu admit ct no pe  Heparin, abx, ntg    Neri Britton DO  Attending Physician       Neri Britton DO  09/20/24 0209

## 2024-09-20 NOTE — CONSULTS
[facility]    Consult / H&P               Today's Date: 9/20/2024  Patient Name: Rossana Alcantara  Date of admission: 9/19/2024 10:57 PM  Patient's age: 89 y.o., 7/13/1930  Admission Dx: NSTEMI (non-ST elevated myocardial infarction) (Former[facility]) [I21.4]  Troponin level elevated [R79.89]  Acute on chronic congestive heart failure, unspecified heart failure type (Formerly [facility]) [I50.9]    Requesting Physician: Gabo Basurto MD    Cardiac Evaluation Reason:  NSTEMI    History Obtained From: patient and chart review     History of Present Illness:    This patient 89 y.o. years old with past medical history given below.     Rossana Alcantara is a 89 y.o. with a history of CAD, status post CABG in 2014, mitral regurgitation, LBBB, HTN, HLD, CHF [EF 40-45%], hypothyroidism, paroxysmal A-fib not on anticoagulation presenting with chest pain and fatigue.  Patient states that the fatigue has been ongoing since waking up this morning.  She states that prior to arrival she noted chest pain and nausea.  She states that she tried to go to bed and ignore the chest pain however she became more nauseous and vomited.  Patient's family called 911, patient was given aspirin and taken to the emergency department.     In the emergency department, patient was found to be tachypneic, diaphoretic.  ECG did show left bundle branch block [old], concern for Sgarbossa criteria.  Cardiology was consulted, recommended trending troponins and admission for cardiac catheterization in the morning.  Patient does follow with cardiology, Dr. Mazariegos.     Given that the patient was meeting SIRS criteria, patient was given 1 dose of Zosyn and vancomycin in the emergency department.     Patient states she is compliant with her medications including her daily aspirin, metoprolol, levothyroxine, and statin.      Past Medical History:   has a past medical history of Accelerating angina ([facility]), Arthritis, CAD (coronary artery disease), Carotid bruit,

## 2024-09-20 NOTE — CARE COORDINATION
Case Management Assessment  Initial Evaluation    Date/Time of Evaluation: 9/20/2024 10:55 AM  Assessment Completed by: Marie Conti RN    If patient is discharged prior to next notation, then this note serves as note for discharge by case management.    Patient Name: Rossana Alcantara                   YOB: 1930  Diagnosis: NSTEMI (non-ST elevated myocardial infarction) (HCC) [I21.4]  Troponin level elevated [R79.89]  Acute on chronic congestive heart failure, unspecified heart failure type (HCC) [I50.9]                   Date / Time: 9/19/2024 10:57 PM    Patient Admission Status: Inpatient   Readmission Risk (Low < 19, Mod (19-27), High > 27): Readmission Risk Score: 15.9    Current PCP: Modesto Ayala MD  PCP verified by CM? (P) Yes    Chart Reviewed: Yes      History Provided by: (P) Patient  Patient Orientation: (P) Alert and Oriented    Patient Cognition: (P) Alert    Hospitalization in the last 30 days (Readmission):  No    If yes, Readmission Assessment in  Navigator will be completed.    Advance Directives:      Code Status: DNR-CCA   Patient's Primary Decision Maker is: (P) Legal Next of Kin      Discharge Planning:    Patient lives with: (P) Alone Type of Home: (P) House  Primary Care Giver: (P) Self  Patient Support Systems include: (P) Family Members   Current Financial resources: (P) Medicare  Current community resources: (P) None  Current services prior to admission: (P) None            Current DME:              Type of Home Care services:  (P) None    ADLS  Prior functional level: (P) Independent in ADLs/IADLs  Current functional level: (P) Independent in ADLs/IADLs    PT AM-PAC:   /24  OT AM-PAC:   /24    Family can provide assistance at DC: (P) Yes  Would you like Case Management to discuss the discharge plan with any other family members/significant others, and if so, who? (P) No  Plans to Return to Present Housing: (P) Yes  Other Identified Issues/Barriers to RETURNING to

## 2024-09-21 ENCOUNTER — APPOINTMENT (OUTPATIENT)
Age: 89
End: 2024-09-21
Payer: MEDICARE

## 2024-09-21 LAB
ANION GAP SERPL CALCULATED.3IONS-SCNC: 12 MMOL/L (ref 9–16)
ANTI-XA UNFRAC HEPARIN: 0.12 IU/L
ANTI-XA UNFRAC HEPARIN: 0.29 IU/L
ANTI-XA UNFRAC HEPARIN: 0.51 IU/L
BASOPHILS # BLD: 0 K/UL (ref 0–0.2)
BASOPHILS NFR BLD: 0 % (ref 0–2)
BNP SERPL-MCNC: ABNORMAL PG/ML (ref 0–300)
BUN SERPL-MCNC: 24 MG/DL (ref 8–23)
CALCIUM SERPL-MCNC: 8.2 MG/DL (ref 8.6–10.4)
CHLORIDE SERPL-SCNC: 99 MMOL/L (ref 98–107)
CO2 SERPL-SCNC: 22 MMOL/L (ref 20–31)
CREAT SERPL-MCNC: 1.6 MG/DL (ref 0.5–0.9)
ECHO AO ASC DIAM: 2.9 CM
ECHO AO ASCENDING AORTA INDEX: 1.65 CM/M2
ECHO AO ROOT DIAM: 2.1 CM
ECHO AO ROOT INDEX: 1.19 CM/M2
ECHO AV AREA PEAK VELOCITY: 1.7 CM2
ECHO AV AREA VTI: 1.5 CM2
ECHO AV AREA/BSA PEAK VELOCITY: 1 CM2/M2
ECHO AV AREA/BSA VTI: 0.9 CM2/M2
ECHO AV MEAN GRADIENT: 5 MMHG
ECHO AV MEAN VELOCITY: 1 M/S
ECHO AV PEAK GRADIENT: 9 MMHG
ECHO AV PEAK VELOCITY: 1.5 M/S
ECHO AV VELOCITY RATIO: 0.73
ECHO AV VTI: 30.6 CM
ECHO BSA: 1.78 M2
ECHO EST RA PRESSURE: 3 MMHG
ECHO IVC PROX: 1.4 CM
ECHO LA AREA 2C: 18.3 CM2
ECHO LA AREA 4C: 17.5 CM2
ECHO LA DIAMETER INDEX: 2.44 CM/M2
ECHO LA DIAMETER: 4.3 CM
ECHO LA MAJOR AXIS: 4.8 CM
ECHO LA MINOR AXIS: 5.1 CM
ECHO LA TO AORTIC ROOT RATIO: 2.05
ECHO LA VOL BP: 55 ML (ref 22–52)
ECHO LA VOL MOD A2C: 54 ML (ref 22–52)
ECHO LA VOL MOD A4C: 53 ML (ref 22–52)
ECHO LA VOL/BSA BIPLANE: 31 ML/M2 (ref 16–34)
ECHO LA VOLUME INDEX MOD A2C: 31 ML/M2 (ref 16–34)
ECHO LA VOLUME INDEX MOD A4C: 30 ML/M2 (ref 16–34)
ECHO LV E' LATERAL VELOCITY: 7.8 CM/S
ECHO LV E' SEPTAL VELOCITY: 3.2 CM/S
ECHO LV EDV A2C: 87 ML
ECHO LV EDV A4C: 110 ML
ECHO LV EDV INDEX A4C: 63 ML/M2
ECHO LV EDV NDEX A2C: 49 ML/M2
ECHO LV EJECTION FRACTION A2C: 51 %
ECHO LV EJECTION FRACTION A4C: 46 %
ECHO LV EJECTION FRACTION BIPLANE: 30 % (ref 55–100)
ECHO LV ESV A2C: 43 ML
ECHO LV ESV A4C: 60 ML
ECHO LV ESV INDEX A2C: 24 ML/M2
ECHO LV ESV INDEX A4C: 34 ML/M2
ECHO LV FRACTIONAL SHORTENING: 12 % (ref 28–44)
ECHO LV INTERNAL DIMENSION DIASTOLE INDEX: 2.78 CM/M2
ECHO LV INTERNAL DIMENSION DIASTOLIC: 4.9 CM (ref 3.9–5.3)
ECHO LV INTERNAL DIMENSION SYSTOLIC INDEX: 2.44 CM/M2
ECHO LV INTERNAL DIMENSION SYSTOLIC: 4.3 CM
ECHO LV IVSD: 1.2 CM (ref 0.6–0.9)
ECHO LV MASS 2D: 226.4 G (ref 67–162)
ECHO LV MASS INDEX 2D: 128.6 G/M2 (ref 43–95)
ECHO LV POSTERIOR WALL DIASTOLIC: 1.2 CM (ref 0.6–0.9)
ECHO LV RELATIVE WALL THICKNESS RATIO: 0.49
ECHO LVOT AREA: 2.3 CM2
ECHO LVOT AV VTI INDEX: 0.64
ECHO LVOT DIAM: 1.7 CM
ECHO LVOT MEAN GRADIENT: 2 MMHG
ECHO LVOT PEAK GRADIENT: 5 MMHG
ECHO LVOT PEAK VELOCITY: 1.1 M/S
ECHO LVOT STROKE VOLUME INDEX: 25.3 ML/M2
ECHO LVOT SV: 44.5 ML
ECHO LVOT VTI: 19.6 CM
ECHO MV A VELOCITY: 1.32 M/S
ECHO MV AREA VTI: 1.2 CM2
ECHO MV E DECELERATION TIME (DT): 125 MS
ECHO MV E VELOCITY: 1.33 M/S
ECHO MV E/A RATIO: 1.01
ECHO MV E/E' LATERAL: 17.05
ECHO MV E/E' RATIO (AVERAGED): 29.31
ECHO MV E/E' SEPTAL: 41.56
ECHO MV LVOT VTI INDEX: 1.87
ECHO MV MAX VELOCITY: 1.3 M/S
ECHO MV MEAN GRADIENT: 5 MMHG
ECHO MV MEAN VELOCITY: 1 M/S
ECHO MV PEAK GRADIENT: 7 MMHG
ECHO MV REGURGITANT PEAK GRADIENT: 125 MMHG
ECHO MV REGURGITANT PEAK VELOCITY: 5.6 M/S
ECHO MV REGURGITANT VTIA: 189 CM
ECHO MV VTI: 36.7 CM
ECHO RIGHT VENTRICULAR SYSTOLIC PRESSURE (RVSP): 43 MMHG
ECHO RV FREE WALL PEAK S': 8.4 CM/S
ECHO RV TAPSE: 1.9 CM (ref 1.7–?)
ECHO TV REGURGITANT MAX VELOCITY: 3.18 M/S
ECHO TV REGURGITANT PEAK GRADIENT: 40 MMHG
EKG ATRIAL RATE: 120 BPM
EKG ATRIAL RATE: 93 BPM
EKG P AXIS: 68 DEGREES
EKG P-R INTERVAL: 128 MS
EKG P-R INTERVAL: 178 MS
EKG Q-T INTERVAL: 386 MS
EKG Q-T INTERVAL: 438 MS
EKG QRS DURATION: 152 MS
EKG QRS DURATION: 156 MS
EKG QTC CALCULATION (BAZETT): 544 MS
EKG QTC CALCULATION (BAZETT): 545 MS
EKG R AXIS: -11 DEGREES
EKG R AXIS: -62 DEGREES
EKG T AXIS: 121 DEGREES
EKG T AXIS: 144 DEGREES
EKG VENTRICULAR RATE: 120 BPM
EKG VENTRICULAR RATE: 93 BPM
EOSINOPHIL # BLD: 0 K/UL (ref 0–0.44)
EOSINOPHILS RELATIVE PERCENT: 0 % (ref 1–4)
ERYTHROCYTE [DISTWIDTH] IN BLOOD BY AUTOMATED COUNT: 14.8 % (ref 11.8–14.4)
GFR, ESTIMATED: 30 ML/MIN/1.73M2
GLUCOSE BLD-MCNC: 174 MG/DL (ref 65–105)
GLUCOSE BLD-MCNC: 184 MG/DL (ref 65–105)
GLUCOSE BLD-MCNC: 192 MG/DL (ref 65–105)
GLUCOSE BLD-MCNC: 195 MG/DL (ref 65–105)
GLUCOSE SERPL-MCNC: 178 MG/DL (ref 74–99)
HCT VFR BLD AUTO: 26.7 % (ref 36.3–47.1)
HGB BLD-MCNC: 8.7 G/DL (ref 11.9–15.1)
IMM GRANULOCYTES # BLD AUTO: 0 K/UL (ref 0–0.3)
IMM GRANULOCYTES NFR BLD: 0 %
LYMPHOCYTES NFR BLD: 1.03 K/UL (ref 1.1–3.7)
LYMPHOCYTES RELATIVE PERCENT: 22 % (ref 24–43)
MAGNESIUM SERPL-MCNC: 2 MG/DL (ref 1.7–2.3)
MCH RBC QN AUTO: 39.9 PG (ref 25.2–33.5)
MCHC RBC AUTO-ENTMCNC: 32.6 G/DL (ref 28.4–34.8)
MCV RBC AUTO: 122.5 FL (ref 82.6–102.9)
MONOCYTES NFR BLD: 0.24 K/UL (ref 0.1–1.2)
MONOCYTES NFR BLD: 5 % (ref 3–12)
MORPHOLOGY: ABNORMAL
MORPHOLOGY: ABNORMAL
NEUTROPHILS NFR BLD: 73 % (ref 36–65)
NEUTS SEG NFR BLD: 3.43 K/UL (ref 1.5–8.1)
NRBC BLD-RTO: 0 PER 100 WBC
PLATELET # BLD AUTO: 142 K/UL (ref 138–453)
PMV BLD AUTO: 11.5 FL (ref 8.1–13.5)
POTASSIUM SERPL-SCNC: 4.4 MMOL/L (ref 3.7–5.3)
RBC # BLD AUTO: 2.18 M/UL (ref 3.95–5.11)
SODIUM SERPL-SCNC: 133 MMOL/L (ref 136–145)
TROPONIN I SERPL HS-MCNC: 1305 NG/L (ref 0–14)
TROPONIN I SERPL HS-MCNC: 1725 NG/L (ref 0–14)
TROPONIN I SERPL HS-MCNC: 1882 NG/L (ref 0–14)
WBC OTHER # BLD: 4.7 K/UL (ref 3.5–11.3)

## 2024-09-21 PROCEDURE — 36415 COLL VENOUS BLD VENIPUNCTURE: CPT

## 2024-09-21 PROCEDURE — 2580000003 HC RX 258: Performed by: STUDENT IN AN ORGANIZED HEALTH CARE EDUCATION/TRAINING PROGRAM

## 2024-09-21 PROCEDURE — 6370000000 HC RX 637 (ALT 250 FOR IP)

## 2024-09-21 PROCEDURE — 83735 ASSAY OF MAGNESIUM: CPT

## 2024-09-21 PROCEDURE — 6370000000 HC RX 637 (ALT 250 FOR IP): Performed by: STUDENT IN AN ORGANIZED HEALTH CARE EDUCATION/TRAINING PROGRAM

## 2024-09-21 PROCEDURE — 85520 HEPARIN ASSAY: CPT

## 2024-09-21 PROCEDURE — 93306 TTE W/DOPPLER COMPLETE: CPT | Performed by: INTERNAL MEDICINE

## 2024-09-21 PROCEDURE — 93306 TTE W/DOPPLER COMPLETE: CPT

## 2024-09-21 PROCEDURE — 84484 ASSAY OF TROPONIN QUANT: CPT

## 2024-09-21 PROCEDURE — 85025 COMPLETE CBC W/AUTO DIFF WBC: CPT

## 2024-09-21 PROCEDURE — 83880 ASSAY OF NATRIURETIC PEPTIDE: CPT

## 2024-09-21 PROCEDURE — 80048 BASIC METABOLIC PNL TOTAL CA: CPT

## 2024-09-21 PROCEDURE — 99291 CRITICAL CARE FIRST HOUR: CPT | Performed by: INTERNAL MEDICINE

## 2024-09-21 PROCEDURE — 82947 ASSAY GLUCOSE BLOOD QUANT: CPT

## 2024-09-21 PROCEDURE — 6360000002 HC RX W HCPCS

## 2024-09-21 PROCEDURE — 99233 SBSQ HOSP IP/OBS HIGH 50: CPT | Performed by: INTERNAL MEDICINE

## 2024-09-21 PROCEDURE — 2060000000 HC ICU INTERMEDIATE R&B

## 2024-09-21 RX ORDER — METOPROLOL TARTRATE 50 MG
50 TABLET ORAL 2 TIMES DAILY
Status: DISCONTINUED | OUTPATIENT
Start: 2024-09-22 | End: 2024-09-22

## 2024-09-21 RX ORDER — INSULIN LISPRO 100 [IU]/ML
0-8 INJECTION, SOLUTION INTRAVENOUS; SUBCUTANEOUS
Status: DISCONTINUED | OUTPATIENT
Start: 2024-09-21 | End: 2024-09-27 | Stop reason: HOSPADM

## 2024-09-21 RX ORDER — METOPROLOL TARTRATE 25 MG/1
25 TABLET, FILM COATED ORAL ONCE
Status: COMPLETED | OUTPATIENT
Start: 2024-09-21 | End: 2024-09-21

## 2024-09-21 RX ORDER — ROPINIROLE 0.25 MG/1
0.25 TABLET, FILM COATED ORAL 3 TIMES DAILY
Status: DISCONTINUED | OUTPATIENT
Start: 2024-09-21 | End: 2024-09-27 | Stop reason: HOSPADM

## 2024-09-21 RX ORDER — METOPROLOL TARTRATE 25 MG/1
25 TABLET, FILM COATED ORAL 2 TIMES DAILY
Status: DISCONTINUED | OUTPATIENT
Start: 2024-09-21 | End: 2024-09-21

## 2024-09-21 RX ORDER — FAMOTIDINE 20 MG/1
20 TABLET, FILM COATED ORAL DAILY
Status: DISCONTINUED | OUTPATIENT
Start: 2024-09-21 | End: 2024-09-27 | Stop reason: HOSPADM

## 2024-09-21 RX ADMIN — Medication 5 MG: at 21:33

## 2024-09-21 RX ADMIN — METOPROLOL TARTRATE 25 MG: 25 TABLET, FILM COATED ORAL at 22:09

## 2024-09-21 RX ADMIN — INSULIN LISPRO 2 UNITS: 100 INJECTION, SOLUTION INTRAVENOUS; SUBCUTANEOUS at 02:53

## 2024-09-21 RX ADMIN — SODIUM CHLORIDE, PRESERVATIVE FREE 10 ML: 5 INJECTION INTRAVENOUS at 08:44

## 2024-09-21 RX ADMIN — CLOPIDOGREL BISULFATE 75 MG: 75 TABLET ORAL at 08:43

## 2024-09-21 RX ADMIN — ACETAMINOPHEN 650 MG: 325 TABLET ORAL at 02:54

## 2024-09-21 RX ADMIN — FAMOTIDINE 20 MG: 20 TABLET, FILM COATED ORAL at 11:04

## 2024-09-21 RX ADMIN — METOPROLOL TARTRATE 25 MG: 25 TABLET, FILM COATED ORAL at 08:43

## 2024-09-21 RX ADMIN — INSULIN LISPRO 2 UNITS: 100 INJECTION, SOLUTION INTRAVENOUS; SUBCUTANEOUS at 06:44

## 2024-09-21 RX ADMIN — INSULIN LISPRO 2 UNITS: 100 INJECTION, SOLUTION INTRAVENOUS; SUBCUTANEOUS at 21:34

## 2024-09-21 RX ADMIN — ROPINIROLE HYDROCHLORIDE 0.25 MG: 0.25 TABLET, FILM COATED ORAL at 11:04

## 2024-09-21 RX ADMIN — HEPARIN SODIUM 2000 UNITS: 1000 INJECTION INTRAVENOUS; SUBCUTANEOUS at 04:42

## 2024-09-21 RX ADMIN — HEPARIN SODIUM 16 UNITS/KG/HR: 10000 INJECTION, SOLUTION INTRAVENOUS at 13:56

## 2024-09-21 RX ADMIN — SODIUM CHLORIDE, PRESERVATIVE FREE 10 ML: 5 INJECTION INTRAVENOUS at 21:34

## 2024-09-21 RX ADMIN — ROPINIROLE HYDROCHLORIDE 0.25 MG: 0.25 TABLET, FILM COATED ORAL at 21:33

## 2024-09-21 RX ADMIN — HEPARIN SODIUM 2000 UNITS: 1000 INJECTION INTRAVENOUS; SUBCUTANEOUS at 13:56

## 2024-09-21 RX ADMIN — ASPIRIN 81 MG 81 MG: 81 TABLET ORAL at 08:43

## 2024-09-21 RX ADMIN — ISOSORBIDE MONONITRATE 60 MG: 60 TABLET, EXTENDED RELEASE ORAL at 08:43

## 2024-09-21 RX ADMIN — LEVOTHYROXINE SODIUM 125 MCG: 75 TABLET ORAL at 06:41

## 2024-09-21 RX ADMIN — ATORVASTATIN CALCIUM 40 MG: 40 TABLET, FILM COATED ORAL at 21:33

## 2024-09-21 RX ADMIN — METOPROLOL TARTRATE 25 MG: 25 TABLET, FILM COATED ORAL at 21:33

## 2024-09-21 RX ADMIN — HEPARIN SODIUM 14 UNITS/KG/HR: 10000 INJECTION, SOLUTION INTRAVENOUS at 04:44

## 2024-09-21 ASSESSMENT — PAIN DESCRIPTION - DESCRIPTORS: DESCRIPTORS: SORE

## 2024-09-21 ASSESSMENT — PAIN SCALES - GENERAL
PAINLEVEL_OUTOF10: 0
PAINLEVEL_OUTOF10: 4
PAINLEVEL_OUTOF10: 0

## 2024-09-21 ASSESSMENT — PAIN DESCRIPTION - LOCATION: LOCATION: LEG

## 2024-09-21 ASSESSMENT — PAIN DESCRIPTION - ORIENTATION: ORIENTATION: RIGHT

## 2024-09-21 NOTE — PROGRESS NOTES
[facility]   Progress Note                   Date:   9/21/2024  Patient name: Rossana Alcantara  Date of admission:  9/19/2024 10:57 PM  MRN:   [id number]  YOB: 1930  PCP: Modesto Ayala MD    Reason for Admission: NSTEMI    Subjective:       Patient seen and examined at the bedside: No acute issues overnight, currently on oxygen to nasal cannula, NSR 92.  /77.  On IV heparin    Medications:   Scheduled Meds:   levothyroxine  125 mcg Oral Daily    [Held by provider] metoprolol succinate  50 mg Oral Daily    isosorbide mononitrate  60 mg Oral Daily    sodium chloride flush  5-40 mL IntraVENous 2 times per day    melatonin  5 mg Oral Nightly    insulin lispro  0-8 Units SubCUTAneous Q4H    sodium chloride flush  5-40 mL IntraVENous 2 times per day    atorvastatin  40 mg Oral Daily    clopidogrel  75 mg Oral Daily    aspirin  81 mg Oral Daily    metoprolol tartrate  25 mg Oral 4x daily     Continuous Infusions:   heparin (PORCINE) Infusion 14 Units/kg/hr (09/21/24 0627)    sodium chloride      dextrose      sodium chloride      nitroGLYCERIN Stopped (09/20/24 2146)     CBC:   Recent Labs     09/20/24  0043 09/20/24  0627 09/21/24  0243   WBC 6.4 6.3 4.7   HGB 8.9* 8.4* 8.7*    158 142     BMP:    Recent Labs     09/19/24  2341 09/20/24  0627 09/21/24  0243   * 135* 133*   K 4.4 4.8 4.4   CL 99 100 99   CO2 19* 22 22   BUN 21 22 24*   CREATININE 1.2* 1.3* 1.6*   GLUCOSE 321* 166* 178*     Hepatic:   Recent Labs     09/19/24  2341   AST 27   ALT 11   BILITOT 0.4   ALKPHOS 52     Troponin: No results for input(s): \"TROPONINI\" in the last 72 hours.  BNP: No results for input(s): \"BNP\" in the last 72 hours.  Lipids: No results for input(s): \"CHOL\", \"HDL\" in the last 72 hours.    Invalid input(s): \"LDLCALCU\"  INR:   Recent Labs     09/20/24  0043   INR 1.2       Objective:   Vitals: BP (!) 140/69   Pulse 91   Temp 97.9 °F (36.6 °C) (Oral)   Resp 29   Ht 1.651 m (5'  symptomatic despite on medical therapy then we will consider high risk PCI to RCA  Will continue to follow    Please wait for final attestation from rounding attending       Cuong Bustillo MD.  Fellow, cardiovascular disease   [facility], [place], [place]    I performed a history and physical examination of the patient and discussed management with the resident. I reviewed the resident’s note and agree with the documented findings and plan of care. Any areas of disagreement are noted on the chart. I was personally present for the key portions of any procedures. I have documented in the chart those procedures where I was not present during the key portions. I have personally evaluated this patient and have completed at least one if not all key elements of the E/M (history, physical exam, and MDM). Additional findings are as noted.      Coronary angiogram reviewed. RCA has diffuse severe disease and small-  not amenable to PCI. Medical therapy.     Breezy Brunner MD MD

## 2024-09-21 NOTE — PLAN OF CARE
Problem: Discharge Planning  Goal: Discharge to home or other facility with appropriate resources  Outcome: Progressing     Problem: Pain  Goal: Verbalizes/displays adequate comfort level or baseline comfort level  Outcome: Progressing  Flowsheets  Taken 9/20/2024 1600 by Nicholas Simental RN  Verbalizes/displays adequate comfort level or baseline comfort level:   Encourage patient to monitor pain and request assistance   Assess pain using appropriate pain scale  Taken 9/20/2024 1200 by Nicholas Simental RN  Verbalizes/displays adequate comfort level or baseline comfort level:   Encourage patient to monitor pain and request assistance   Assess pain using appropriate pain scale     Problem: Skin/Tissue Integrity  Goal: Absence of new skin breakdown  Description: 1.  Monitor for areas of redness and/or skin breakdown  2.  Assess vascular access sites hourly  3.  Every 4-6 hours minimum:  Change oxygen saturation probe site  4.  Every 4-6 hours:  If on nasal continuous positive airway pressure, respiratory therapy assess nares and determine need for appliance change or resting period.  Outcome: Progressing     Problem: Safety - Adult  Goal: Free from fall injury  Outcome: Progressing     Problem: ABCDS Injury Assessment  Goal: Absence of physical injury  Outcome: Progressing     Problem: Chronic Conditions and Co-morbidities  Goal: Patient's chronic conditions and co-morbidity symptoms are monitored and maintained or improved  Outcome: Progressing

## 2024-09-21 NOTE — TRANSITION OF CARE
Critical care team - Resident sign-out to medicine service      Date and time: 9/21/2024 10:59 AM  Patient's name:  Rossana Alcantara  Medical Record Number: [id number]  Patient's account/billing number: [id number]  Patient's YOB: 1930  Age: 89 y.o.  Date of Admission: 9/19/2024 10:57 PM  Length of stay during current admission: 1    Primary Care Physician: Modesto Ayala MD    Code Status: DNR-CCA    Mode of physician to physician communication:        [x] Via telephone   [] In person     Date and time of sign-out: 9/21/2024 10:59 AM    Accepting Internal Medicine resident: Dr. Juan Mack    Accepting Medicine team: IM Team MED 1    Accepting team's attending: Dr. Childress    Patient's current ICU Bed:  3024     Patient's assigned bed on floor:  406        [] Med-Surg Monitored [x] Step-down       [] Psychiatry ICU       [] Psych floor     Reason for ICU admission:     IV nitro drip and closer hemodynamic monitoring    ICU course summary:     Rossana Alcantara is a 89 y.o. with a history of CAD, status post CABG in 2014, mitral regurgitation, LBBB, HTN, HLD, CHF [EF 40-45%], hypothyroidism, paroxysmal A-fib not on anticoagulation presenting with chest pain and fatigue.  As per patient, she was noting the pain since the morning.  However, she became more nauseous and had an episode of vomiting.  Family called 911 and patient was brought to the emergency department further evaluation.    In the ER, patient was found to be tachypneic and diaphoretic.  EKG was done which showed old left bundle branch block.  Cardiology was consulted for uptrending troponin and chest pain.  She was started on IV heparin drip with a plan to undergo cardiac cath in the morning.  Patient was found to have elevated D-dimer level and hence, she underwent CT PE which was unremarkable for any pulmonary embolism but it did show nonspecific patchy groundglass infiltrate with trace bilateral effusions.  Patient was  placed on BiPAP and IV nitro drip.    She underwent cardiac cath on 9/21/2024, she was found to have occluded SVG to RCA.  Patient had severe RCA disease as well.  Cardiology recommended to continue the medical management for pain.  However, if pain is not controlled on medication then, cardiology recommends to go for high risk PCI.  Patient is currently on aspirin, Plavix and IV heparin drip as per cardiology recommendations.  She is also on Imdur 60 mg daily and started on Lopressor 25 mg twice daily.    Patient is currently afebrile and hemodynamically stable.  She is okay to be transferred out from ICU to floor for further management.    Procedures during patient's ICU stay:     Left heart cardiac catheterization    Current Vitals:     /86   Pulse 88   Temp 98 °F (36.7 °C) (Oral)   Resp 24   Ht 1.651 m (5' 5\")   Wt 68.9 kg (152 lb)   SpO2 100%   BMI 25.29 kg/m²       Cultures:     Blood cultures:                 [] None drawn      [] Negative             []  Positive (Details:  )  Urine Culture:                   [] None drawn      [] Negative             []  Positive (Details:  )  Sputum Culture:               [] None drawn       [] Negative             []  Positive (Details:  )   Endotracheal aspirate:     [] None drawn       [] Negative             []  Positive (Details:  )       Consults:     1.  Cardiology    Assessment:     Patient Active Problem List    Diagnosis Date Noted    Acute on chronic congestive heart failure (HCC) 09/20/2024    NSTEMI (non-ST elevated myocardial infarction) ([facility]) 09/20/2024    Chest pain 04/05/2022    Left lower lobe pneumonia 04/04/2022    Pneumonia 04/04/2022    Primary hypertension 04/04/2022    Hypothyroidism 04/04/2022    ASCVD (arteriosclerotic cardiovascular disease) 04/04/2022    CAD (coronary artery disease) 05/10/2014    Angina pectoris (HCC) 05/10/2014    HTN (hypertension) 05/10/2014           Recommended Follow-up:     Continue IV heparin drip for total

## 2024-09-21 NOTE — PROGRESS NOTES
[facility]  Internal Medicine Teaching Residency Program  Inpatient Daily Progress Note  ______________________________________________________________________________    Patient: Rossana Alcantara  YOB: 1930   MRN:[id number]    Acct: [id number]     Room: 3024/3024-01  Admit date: 9/19/2024  Today's date: 09/21/24  Number of days in the hospital: 1    SUBJECTIVE   Admitting Diagnosis: NSTEMI (non-ST elevated myocardial infarction) (HCC)  CC: chest pain     ICU transfer / step down     Pt examined at bedside. Chart & results reviewed.   Generally well, no active concerns or symptoms   Afebrile and vitals stable - sat 97 to 100 % on 2 L   No more chest pains since Friday morning 9/20/24     Trop: 123 > 292 > 1592 > 1611 > 997 > 1305 > 1882 > 1725   BNP 47,176   Echo - 9/21/24 - Mildly reduced LVSF with EF 30-35 %, mild concentric LVH, mild to mod TR - RVSP 43 mmHg       ROS:  Constitutional:  negative for chills, fevers, sweats  Respiratory:  negative for cough, dyspnea on exertion, hemoptysis, shortness of breath, wheezing  Cardiovascular:  negative for chest pain, chest pressure/discomfort, lower extremity edema, palpitations  Gastrointestinal:  negative for abdominal pain, constipation, diarrhea, nausea, vomiting  Neurological:  negative for dizziness, headache  BRIEF HISTORY   Rossana Alcantara is 89-year-old female whose history includes CAD s/p CABG in 2014, mitral regurgitation, LBBB, hypertension, hyperlipidemia, CHF, hypothyroidism and paroxysmal AF not on anticoagulation.  She woke up on Thursday morning, 9/19/2024, and was feeling fatigued throughout the day.  She started to experience extreme tiredness around bedtime.  As soon as she laid down she started to have pressure type severe central chest pain followed by diaphoresis and vomiting.  911 was called and patient was brought to the hospital.  EKG was known left bundle branch block, however

## 2024-09-21 NOTE — PROGRESS NOTES
INTENSIVE CARE UNIT  Resident Physician Progress Note    Patient - Rossana Alcantara  Date of Admission -  9/19/2024 10:57 PM  Date of Evaluation -  9/21/2024  Room and Bed Number -  3024/3024-01   Hospital Day - 1    SUBJECTIVE:     HISTORY OF PRESENT ILLNESS:    History was obtained from chart review and the patient.       Rossana Alcantara is a 89 y.o. with a history of CAD, status post CABG in 2014, mitral regurgitation, LBBB, HTN, HLD, CHF [EF 40-45%], hypothyroidism, paroxysmal A-fib not on anticoagulation presenting with chest pain and fatigue.  Patient states that the fatigue has been ongoing since waking up this morning.  She states that prior to arrival she noted chest pain and nausea.  She states that she tried to go to bed and ignore the chest pain however she became more nauseous and vomited.  Patient's family called 911, patient was given aspirin and taken to the emergency department.     In the emergency department, patient was found to be tachypneic, diaphoretic.  ECG did show left bundle branch block [old], concern for Sgarbossa criteria.  Cardiology was consulted, recommended trending troponins and admission for cardiac catheterization in the morning.  Patient does follow with cardiology, Dr. Mazariegos.     Given that the patient was meeting SIRS criteria, patient was given 1 dose of Zosyn and vancomycin in the emergency department.     Patient states she is compliant with her medications including her daily aspirin, metoprolol, levothyroxine, and statin.     Labs in the ED show:  Hb 9.7, WBC 6.8,   Na 134, K 4.4, Cl 99, bicarb 19, anion gap 16, glucose 321, LA 2.7  Cr 1.2 [baseline], BUN 21, ALT 11, AST 27, alk phos 52, T bili 0.4, lipase 19  Trop 123 > 292 [baseline 20s]  BNP 15,996 [no baseline]  INR 1.2, APTT 28.2     Imaging in the ED shows:  CT PE study - no PE, nonspecific patchy groundglass infiltrates with trace bilateral effusions.   CT abdomen pelvis - unremarkable, no dissection  Dragon dictation software. Although every effort was made to ensure the accuracy of this automated transcription, some errors in transcription may have occurred.     Evert Kang MD  9/21/2024 11:07 AM

## 2024-09-22 ENCOUNTER — APPOINTMENT (OUTPATIENT)
Dept: ULTRASOUND IMAGING | Age: 89
End: 2024-09-22
Payer: MEDICARE

## 2024-09-22 PROBLEM — E61.1 IRON DEFICIENCY: Status: ACTIVE | Noted: 2024-09-22

## 2024-09-22 PROBLEM — J96.01 ACUTE HYPOXIC RESPIRATORY FAILURE: Status: ACTIVE | Noted: 2024-09-22

## 2024-09-22 PROBLEM — I25.5 ISCHEMIC CARDIOMYOPATHY: Status: ACTIVE | Noted: 2024-09-22

## 2024-09-22 PROBLEM — R79.89 TROPONIN LEVEL ELEVATED: Status: ACTIVE | Noted: 2024-09-22

## 2024-09-22 PROBLEM — I48.0 PAROXYSMAL A-FIB (HCC): Status: ACTIVE | Noted: 2024-09-22

## 2024-09-22 PROBLEM — N18.9 ACUTE KIDNEY INJURY SUPERIMPOSED ON CKD (HCC): Status: ACTIVE | Noted: 2024-09-22

## 2024-09-22 PROBLEM — E78.5 HYPERLIPIDEMIA: Status: ACTIVE | Noted: 2024-09-22

## 2024-09-22 PROBLEM — I44.7 LEFT BUNDLE BRANCH BLOCK: Status: ACTIVE | Noted: 2024-09-22

## 2024-09-22 PROBLEM — E87.1 HYPONATREMIA: Status: ACTIVE | Noted: 2024-09-22

## 2024-09-22 PROBLEM — D64.9 ACUTE ON CHRONIC ANEMIA: Status: ACTIVE | Noted: 2024-09-22

## 2024-09-22 PROBLEM — N17.9 ACUTE KIDNEY INJURY SUPERIMPOSED ON CKD (HCC): Status: ACTIVE | Noted: 2024-09-22

## 2024-09-22 LAB
ANION GAP SERPL CALCULATED.3IONS-SCNC: 9 MMOL/L (ref 9–16)
ANTI-XA UNFRAC HEPARIN: 0.44 IU/L
BACTERIA URNS QL MICRO: ABNORMAL
BASOPHILS # BLD: 0 K/UL (ref 0–0.2)
BASOPHILS NFR BLD: 0 % (ref 0–2)
BILIRUB UR QL STRIP: NEGATIVE
BUN SERPL-MCNC: 28 MG/DL (ref 8–23)
C3 SERPL-MCNC: 157 MG/DL (ref 90–180)
C4 SERPL-MCNC: 28 MG/DL (ref 10–40)
CALCIUM SERPL-MCNC: 8.6 MG/DL (ref 8.6–10.4)
CASTS #/AREA URNS LPF: ABNORMAL /LPF (ref 0–8)
CHLORIDE SERPL-SCNC: 99 MMOL/L (ref 98–107)
CHLORIDE UR-SCNC: 28 MMOL/L
CLARITY UR: ABNORMAL
CO2 SERPL-SCNC: 25 MMOL/L (ref 20–31)
COLOR UR: YELLOW
CREAT SERPL-MCNC: 2.3 MG/DL (ref 0.5–0.9)
CREAT UR-MCNC: 136 MG/DL (ref 28–217)
EOSINOPHIL # BLD: 0.1 K/UL (ref 0–0.44)
EOSINOPHILS RELATIVE PERCENT: 2 % (ref 1–4)
EPI CELLS #/AREA URNS HPF: ABNORMAL /HPF (ref 0–5)
ERYTHROCYTE [DISTWIDTH] IN BLOOD BY AUTOMATED COUNT: 14.9 % (ref 11.8–14.4)
EST. AVERAGE GLUCOSE BLD GHB EST-MCNC: 177 MG/DL
FOLATE SERPL-MCNC: >20 NG/ML (ref 4.8–24.2)
FREE KAPPA/LAMBDA RATIO: 0.05 (ref 0.22–1.74)
GFR, ESTIMATED: 19 ML/MIN/1.73M2
GLUCOSE BLD-MCNC: 167 MG/DL (ref 65–105)
GLUCOSE BLD-MCNC: 172 MG/DL (ref 65–105)
GLUCOSE BLD-MCNC: 204 MG/DL (ref 65–105)
GLUCOSE BLD-MCNC: 204 MG/DL (ref 65–105)
GLUCOSE SERPL-MCNC: 139 MG/DL (ref 74–99)
GLUCOSE UR STRIP-MCNC: NEGATIVE MG/DL
HAV IGM SERPL QL IA: NONREACTIVE
HBA1C MFR BLD: 7.8 % (ref 4–6)
HBV CORE IGM SERPL QL IA: NONREACTIVE
HBV SURFACE AG SERPL QL IA: NONREACTIVE
HCT VFR BLD AUTO: 23.8 % (ref 36.3–47.1)
HCV AB SERPL QL IA: NONREACTIVE
HGB BLD-MCNC: 7.7 G/DL (ref 11.9–15.1)
HGB UR QL STRIP.AUTO: ABNORMAL
IMM GRANULOCYTES # BLD AUTO: 0 K/UL (ref 0–0.3)
IMM GRANULOCYTES NFR BLD: 0 %
IRON SATN MFR SERPL: 11 % (ref 20–55)
IRON SERPL-MCNC: 25 UG/DL (ref 37–145)
KAPPA LC FREE SER-MCNC: 41.1 MG/L
KETONES UR STRIP-MCNC: NEGATIVE MG/DL
LAMBDA LC FREE SERPL-MCNC: 882 MG/L (ref 4.2–27.7)
LEUKOCYTE ESTERASE UR QL STRIP: ABNORMAL
LYMPHOCYTES NFR BLD: 1.6 K/UL (ref 1.1–3.7)
LYMPHOCYTES RELATIVE PERCENT: 32 % (ref 24–43)
MAGNESIUM SERPL-MCNC: 1.9 MG/DL (ref 1.7–2.3)
MCH RBC QN AUTO: 39.3 PG (ref 25.2–33.5)
MCHC RBC AUTO-ENTMCNC: 32.4 G/DL (ref 28.4–34.8)
MCV RBC AUTO: 121.4 FL (ref 82.6–102.9)
MONOCYTES NFR BLD: 0.25 K/UL (ref 0.1–1.2)
MONOCYTES NFR BLD: 5 % (ref 3–12)
MORPHOLOGY: ABNORMAL
MORPHOLOGY: ABNORMAL
NEUTROPHILS NFR BLD: 61 % (ref 36–65)
NEUTS SEG NFR BLD: 3.05 K/UL (ref 1.5–8.1)
NITRITE UR QL STRIP: NEGATIVE
NRBC BLD-RTO: 0 PER 100 WBC
OSMOLALITY UR: 455 MOSM/KG (ref 80–1300)
PH UR STRIP: 5.5 [PH] (ref 5–8)
PLATELET # BLD AUTO: 147 K/UL (ref 138–453)
PMV BLD AUTO: 11.7 FL (ref 8.1–13.5)
POTASSIUM SERPL-SCNC: 4.3 MMOL/L (ref 3.7–5.3)
PROT UR STRIP-MCNC: ABNORMAL MG/DL
RBC # BLD AUTO: 1.96 M/UL (ref 3.95–5.11)
RBC #/AREA URNS HPF: ABNORMAL /HPF (ref 0–4)
SODIUM SERPL-SCNC: 133 MMOL/L (ref 136–145)
SODIUM UR-SCNC: 36 MMOL/L
SP GR UR STRIP: 1.04 (ref 1–1.03)
TIBC SERPL-MCNC: 238 UG/DL (ref 250–450)
TOTAL PROTEIN, URINE: 333 MG/DL
TROPONIN I SERPL HS-MCNC: 2080 NG/L (ref 0–14)
TROPONIN I SERPL HS-MCNC: 2116 NG/L (ref 0–14)
UNSATURATED IRON BINDING CAPACITY: 213 UG/DL (ref 112–347)
URINE TOTAL PROTEIN CREATININE RATIO: 2.45
UROBILINOGEN UR STRIP-ACNC: NORMAL EU/DL (ref 0–1)
VIT B12 SERPL-MCNC: 355 PG/ML (ref 232–1245)
WBC #/AREA URNS HPF: ABNORMAL /HPF (ref 0–5)
WBC OTHER # BLD: 5 K/UL (ref 3.5–11.3)

## 2024-09-22 PROCEDURE — 84484 ASSAY OF TROPONIN QUANT: CPT

## 2024-09-22 PROCEDURE — 84300 ASSAY OF URINE SODIUM: CPT

## 2024-09-22 PROCEDURE — 83935 ASSAY OF URINE OSMOLALITY: CPT

## 2024-09-22 PROCEDURE — 83735 ASSAY OF MAGNESIUM: CPT

## 2024-09-22 PROCEDURE — 84166 PROTEIN E-PHORESIS/URINE/CSF: CPT

## 2024-09-22 PROCEDURE — 82570 ASSAY OF URINE CREATININE: CPT

## 2024-09-22 PROCEDURE — 86334 IMMUNOFIX E-PHORESIS SERUM: CPT

## 2024-09-22 PROCEDURE — 51798 US URINE CAPACITY MEASURE: CPT

## 2024-09-22 PROCEDURE — 83036 HEMOGLOBIN GLYCOSYLATED A1C: CPT

## 2024-09-22 PROCEDURE — 2580000003 HC RX 258: Performed by: NURSE PRACTITIONER

## 2024-09-22 PROCEDURE — 82436 ASSAY OF URINE CHLORIDE: CPT

## 2024-09-22 PROCEDURE — 36415 COLL VENOUS BLD VENIPUNCTURE: CPT

## 2024-09-22 PROCEDURE — 2060000000 HC ICU INTERMEDIATE R&B

## 2024-09-22 PROCEDURE — 85520 HEPARIN ASSAY: CPT

## 2024-09-22 PROCEDURE — 82607 VITAMIN B-12: CPT

## 2024-09-22 PROCEDURE — 86225 DNA ANTIBODY NATIVE: CPT

## 2024-09-22 PROCEDURE — 83540 ASSAY OF IRON: CPT

## 2024-09-22 PROCEDURE — 6370000000 HC RX 637 (ALT 250 FOR IP)

## 2024-09-22 PROCEDURE — 6360000002 HC RX W HCPCS

## 2024-09-22 PROCEDURE — 81001 URINALYSIS AUTO W/SCOPE: CPT

## 2024-09-22 PROCEDURE — 83521 IG LIGHT CHAINS FREE EACH: CPT

## 2024-09-22 PROCEDURE — 82947 ASSAY GLUCOSE BLOOD QUANT: CPT

## 2024-09-22 PROCEDURE — 97166 OT EVAL MOD COMPLEX 45 MIN: CPT

## 2024-09-22 PROCEDURE — 86335 IMMUNFIX E-PHORSIS/URINE/CSF: CPT

## 2024-09-22 PROCEDURE — 84156 ASSAY OF PROTEIN URINE: CPT

## 2024-09-22 PROCEDURE — 86160 COMPLEMENT ANTIGEN: CPT

## 2024-09-22 PROCEDURE — 84155 ASSAY OF PROTEIN SERUM: CPT

## 2024-09-22 PROCEDURE — 85025 COMPLETE CBC W/AUTO DIFF WBC: CPT

## 2024-09-22 PROCEDURE — 86038 ANTINUCLEAR ANTIBODIES: CPT

## 2024-09-22 PROCEDURE — 99233 SBSQ HOSP IP/OBS HIGH 50: CPT | Performed by: HOSPITALIST

## 2024-09-22 PROCEDURE — 6370000000 HC RX 637 (ALT 250 FOR IP): Performed by: STUDENT IN AN ORGANIZED HEALTH CARE EDUCATION/TRAINING PROGRAM

## 2024-09-22 PROCEDURE — 97535 SELF CARE MNGMENT TRAINING: CPT

## 2024-09-22 PROCEDURE — 82746 ASSAY OF FOLIC ACID SERUM: CPT

## 2024-09-22 PROCEDURE — 84165 PROTEIN E-PHORESIS SERUM: CPT

## 2024-09-22 PROCEDURE — 2580000003 HC RX 258

## 2024-09-22 PROCEDURE — 99223 1ST HOSP IP/OBS HIGH 75: CPT | Performed by: INTERNAL MEDICINE

## 2024-09-22 PROCEDURE — 80048 BASIC METABOLIC PNL TOTAL CA: CPT

## 2024-09-22 PROCEDURE — 99233 SBSQ HOSP IP/OBS HIGH 50: CPT | Performed by: NURSE PRACTITIONER

## 2024-09-22 PROCEDURE — 76770 US EXAM ABDO BACK WALL COMP: CPT

## 2024-09-22 PROCEDURE — 83550 IRON BINDING TEST: CPT

## 2024-09-22 PROCEDURE — 80074 ACUTE HEPATITIS PANEL: CPT

## 2024-09-22 RX ORDER — FUROSEMIDE 10 MG/ML
20 INJECTION INTRAMUSCULAR; INTRAVENOUS DAILY
Status: DISCONTINUED | OUTPATIENT
Start: 2024-09-22 | End: 2024-09-23

## 2024-09-22 RX ORDER — SODIUM CHLORIDE 9 MG/ML
INJECTION, SOLUTION INTRAVENOUS CONTINUOUS
Status: ACTIVE | OUTPATIENT
Start: 2024-09-22 | End: 2024-09-23

## 2024-09-22 RX ORDER — ALBUTEROL SULFATE 90 UG/1
2 INHALANT RESPIRATORY (INHALATION) EVERY 6 HOURS PRN
Status: DISCONTINUED | OUTPATIENT
Start: 2024-09-22 | End: 2024-09-27 | Stop reason: HOSPADM

## 2024-09-22 RX ORDER — METOPROLOL TARTRATE 25 MG/1
25 TABLET, FILM COATED ORAL 2 TIMES DAILY
Status: DISCONTINUED | OUTPATIENT
Start: 2024-09-22 | End: 2024-09-23

## 2024-09-22 RX ADMIN — ROPINIROLE HYDROCHLORIDE 0.25 MG: 0.25 TABLET, FILM COATED ORAL at 08:45

## 2024-09-22 RX ADMIN — SODIUM CHLORIDE: 9 INJECTION, SOLUTION INTRAVENOUS at 11:37

## 2024-09-22 RX ADMIN — INSULIN LISPRO 2 UNITS: 100 INJECTION, SOLUTION INTRAVENOUS; SUBCUTANEOUS at 08:46

## 2024-09-22 RX ADMIN — ATORVASTATIN CALCIUM 40 MG: 40 TABLET, FILM COATED ORAL at 21:02

## 2024-09-22 RX ADMIN — ROPINIROLE HYDROCHLORIDE 0.25 MG: 0.25 TABLET, FILM COATED ORAL at 21:03

## 2024-09-22 RX ADMIN — FUROSEMIDE 20 MG: 10 INJECTION, SOLUTION INTRAMUSCULAR; INTRAVENOUS at 08:45

## 2024-09-22 RX ADMIN — CLOPIDOGREL BISULFATE 75 MG: 75 TABLET ORAL at 08:45

## 2024-09-22 RX ADMIN — HEPARIN SODIUM 16 UNITS/KG/HR: 10000 INJECTION, SOLUTION INTRAVENOUS at 08:45

## 2024-09-22 RX ADMIN — METOPROLOL TARTRATE 25 MG: 25 TABLET, FILM COATED ORAL at 21:02

## 2024-09-22 RX ADMIN — METOPROLOL TARTRATE 25 MG: 25 TABLET, FILM COATED ORAL at 08:45

## 2024-09-22 RX ADMIN — Medication 5 MG: at 21:03

## 2024-09-22 RX ADMIN — FAMOTIDINE 20 MG: 20 TABLET, FILM COATED ORAL at 08:46

## 2024-09-22 RX ADMIN — ROPINIROLE HYDROCHLORIDE 0.25 MG: 0.25 TABLET, FILM COATED ORAL at 15:21

## 2024-09-22 RX ADMIN — INSULIN LISPRO 2 UNITS: 100 INJECTION, SOLUTION INTRAVENOUS; SUBCUTANEOUS at 21:03

## 2024-09-22 RX ADMIN — LEVOTHYROXINE SODIUM 125 MCG: 75 TABLET ORAL at 06:44

## 2024-09-22 RX ADMIN — ISOSORBIDE MONONITRATE 60 MG: 60 TABLET, EXTENDED RELEASE ORAL at 08:45

## 2024-09-22 RX ADMIN — ASPIRIN 81 MG 81 MG: 81 TABLET ORAL at 08:45

## 2024-09-22 RX ADMIN — SODIUM CHLORIDE 125 MG: 9 INJECTION, SOLUTION INTRAVENOUS at 11:38

## 2024-09-22 NOTE — PLAN OF CARE
Problem: Discharge Planning  Goal: Discharge to home or other facility with appropriate resources  9/22/2024 0308 by Daljit Ma RN  Outcome: Progressing  9/21/2024 1608 by Yennifer Cheney RN  Outcome: Progressing     Problem: Pain  Goal: Verbalizes/displays adequate comfort level or baseline comfort level  9/22/2024 0308 by Daljit Ma RN  Outcome: Progressing  9/21/2024 1608 by Yennifer Cheney RN  Outcome: Progressing     Problem: Skin/Tissue Integrity  Goal: Absence of new skin breakdown  Description: 1.  Monitor for areas of redness and/or skin breakdown  2.  Assess vascular access sites hourly  3.  Every 4-6 hours minimum:  Change oxygen saturation probe site  4.  Every 4-6 hours:  If on nasal continuous positive airway pressure, respiratory therapy assess nares and determine need for appliance change or resting period.  9/22/2024 0308 by Daljit Ma RN  Outcome: Progressing  9/21/2024 1608 by Yennifer Chenye RN  Outcome: Progressing     Problem: Safety - Adult  Goal: Free from fall injury  9/22/2024 0308 by Daljit Ma RN  Outcome: Progressing  9/21/2024 1608 by Yennifer Cheney RN  Outcome: Progressing     Problem: ABCDS Injury Assessment  Goal: Absence of physical injury  9/22/2024 0308 by Daljit Ma RN  Outcome: Progressing  9/21/2024 1608 by Yennifer Cheney RN  Outcome: Progressing     Problem: Chronic Conditions and Co-morbidities  Goal: Patient's chronic conditions and co-morbidity symptoms are monitored and maintained or improved  9/22/2024 0308 by Daljit Ma RN  Outcome: Progressing  9/21/2024 1608 by Yennifer Cheney RN  Outcome: Progressing

## 2024-09-22 NOTE — PROGRESS NOTES
Dr. Bustillo notified of trop 2116 at 1014.Message read with no response. Unable to contact Alessia GOODMAN with cardio- communicated to Dr. Bustillo.

## 2024-09-22 NOTE — PROGRESS NOTES
mobility  Supine to Sit: Modified independent  Sit to Supine: Unable to assess  Scooting: Modified independent  Bed Mobility Comments: Pt retired in recliner    Transfers  Sit to stand: Supervision  Stand to sit: Supervision         Functional Mobility Skilled Clinical Factors: pt used RW, some LB \"wobbling\" noted at times, func mob to/from bathroom only, fatigue noted     Patient Education  Patient Education  Education Given To: Patient  Education Provided: Role of Therapy;Plan of Care;Transfer Training;Fall Prevention Strategies  Education Method: Verbal  Barriers to Learning: None  Education Outcome: Verbalized understanding;Continued education needed    Goals  Short Term Goals  Time Frame for Short Term Goals: Pt will by d/c  Short Term Goal 1: demo good safety awareness during func mob around room using LRAD PRN at mod I  Short Term Goal 2: demo ADL UB bathing/dressing activity at (I)  Short Term Goal 3: demo ADL LB bathing/dressing activity at mod I  Short Term Goal 4: demo activity tolerance for 40 min+  Short Term Goal 5: demo bending/reaching high/low func activity for 8 min+, while standing, using LRAD PRN and mod I    Plan  Occupational Therapy Plan  Times Per Week: 3-5x  Current Treatment Recommendations: Balance training, Functional mobility training, Endurance training, Pain management, Safety education & training, Patient/Caregiver education & training, Equipment evaluation, education, & procurement, Self-Care / ADL, Home management training    AM-PAC Daily Activities Inpatient  AM-PAC Daily Activity - Inpatient   How much help is needed for putting on and taking off regular lower body clothing?: A Little  How much help is needed for bathing (which includes washing, rinsing, drying)?: A Little  How much help is needed for toileting (which includes using toilet, bedpan, or urinal)?: A Little  How much help is needed for putting on and taking off regular upper body clothing?: None  How much help is  needed for taking care of personal grooming?: None  How much help for eating meals?: None  AM-PAC Inpatient Daily Activity Raw Score: 21  AM-PAC Inpatient ADL T-Scale Score : 44.27  ADL Inpatient CMS 0-100% Score: 32.79  ADL Inpatient CMS G-Code Modifier : CJ    Minutes  OT Individual Minutes  Time In: 1200  Time Out: 1241  Minutes: 41  Time Code Minutes   Timed Code Treatment Minutes: 38 Minutes    Electronically signed by YURY Pichardo on 9/22/24 at 2:45 PM EDT

## 2024-09-22 NOTE — PROGRESS NOTES
[facility]  Internal Medicine Teaching Residency Program  Inpatient Daily Progress Note  ______________________________________________________________________________    Patient: Rossana Alcantara  YOB: 1930   MRN:[id number]    Acct: [id number]     Room: [facility]0421-02  Admit date: 9/19/2024  Today's date: 09/22/24  Number of days in the hospital: 2    SUBJECTIVE   Admitting Diagnosis: NSTEMI (non-ST elevated myocardial infarction) (HCC)  CC: chest pain     ICU transfer / step down     Pt examined at bedside. Chart & results reviewed.   Generally well, no active concerns or symptoms   Afebrile and vitals stable - sat 97 to 100 % on 2 L - wean off to 1 L   Output recorded as only 300 ml   No more chest pains since Friday morning 9/20/24 - generally feeling better, slept well overnight.     Trop: 123 > 292 > 1592 > 1611 > 997 > 1305 > 1882 > 1725 > 2080   BNP 47,176   Creat: 1.2 > 1.6 > 2.3   BUN:  22 > 24 > 28   Na: 133   Echo - 9/21/24 - Mildly reduced LVSF with EF 30-35 %, mild concentric LVH, mild to mod TR - RVSP 43 mmHg       ROS:  Constitutional:  negative for chills, fevers, sweats  Respiratory:  negative for cough, dyspnea on exertion, hemoptysis, wheezing  Cardiovascular:  negative for chest pain, chest pressure/discomfort, lower extremity edema, palpitations  Gastrointestinal:  negative for abdominal pain, constipation, diarrhea, nausea, vomiting  Neurological:  negative for dizziness, headache  BRIEF HISTORY   Rossana Alcantara is 89-year-old female whose history includes CAD s/p CABG in 2014, mitral regurgitation, LBBB, hypertension, hyperlipidemia, CHF, hypothyroidism and paroxysmal AF not on anticoagulation.  She woke up on Thursday morning, 9/19/2024, and was feeling fatigued throughout the day.  She started to experience extreme tiredness around bedtime.  As soon as she laid down she started to have pressure type severe central chest pain  patient's general behavior, level of consciousness, thought content and emotional status is normal.        Medications:  Scheduled Medications:    metoprolol tartrate  25 mg Oral BID    famotidine  20 mg Oral Daily    rOPINIRole  0.25 mg Oral TID    insulin lispro  0-8 Units SubCUTAneous 4x Daily AC & HS    levothyroxine  125 mcg Oral Daily    [Held by provider] metoprolol succinate  50 mg Oral Daily    isosorbide mononitrate  60 mg Oral Daily    sodium chloride flush  5-40 mL IntraVENous 2 times per day    melatonin  5 mg Oral Nightly    sodium chloride flush  5-40 mL IntraVENous 2 times per day    atorvastatin  40 mg Oral Daily    clopidogrel  75 mg Oral Daily    aspirin  81 mg Oral Daily     Continuous Infusions:    heparin (PORCINE) Infusion 16 Units/kg/hr (09/22/24 0342)    sodium chloride      dextrose      sodium chloride       PRN Medicationsalbuterol sulfate HFA, 2 puff, Q6H PRN  heparin (porcine), 4,000 Units, PRN  heparin (porcine), 2,000 Units, PRN  sodium chloride flush, 5-40 mL, PRN  sodium chloride, , PRN  potassium chloride, 20 mEq, PRN   Or  potassium chloride, 10 mEq, PRN  magnesium sulfate, 2,000 mg, PRN  ondansetron, 4 mg, Q8H PRN   Or  ondansetron, 4 mg, Q6H PRN  polyethylene glycol, 17 g, Daily PRN  acetaminophen, 650 mg, Q6H PRN   Or  acetaminophen, 650 mg, Q6H PRN  glucose, 4 tablet, PRN  dextrose bolus, 125 mL, PRN   Or  dextrose bolus, 250 mL, PRN  glucagon (rDNA), 1 mg, PRN  dextrose, , Continuous PRN  fentanNYL, 25 mcg, Q1H PRN  sodium chloride flush, 5-40 mL, PRN  sodium chloride, , PRN  acetaminophen, 650 mg, Q4H PRN        Diagnostic Labs:  CBC:   Recent Labs     09/20/24  0627 09/21/24  0243 09/22/24  0250   WBC 6.3 4.7 5.0   RBC 2.14* 2.18* 1.96*   HGB 8.4* 8.7* 7.7*   HCT 26.0* 26.7* 23.8*   .5* 122.5* 121.4*   RDW 14.8* 14.8* 14.9*    142 147     BMP:   Recent Labs     09/20/24  0627 09/21/24  0243 09/22/24  0250   * 133* 133*   K 4.8 4.4 4.3    99 99

## 2024-09-22 NOTE — PROGRESS NOTES
mild regurgitation. The right ventricular   systolic pressure is mildly elevated. RVSP calculated at 41 mmHg. RVSP is   based on RA pressure of 3 mmHg. There is mild pulmonary hypertension.     Left Ventricle   Left ventricle appears normal in size. There is moderate increased wall thickness/hypertrophy. Systolic function is mildly to moderately decreased with an ejection fraction of 40-45%. See wall score diagram for wall motion abnormalities. Lateral E' is 7.62 cm/s. Medial E' is 4.68 cm/s.     Right Ventricle   Right ventricular size appears normal. The right ventricular basal diameter is 24.0 mm. Systolic function is normal. Normal tricuspid annular plane systolic excursion.     Left Atrium   Left atrium volume index is severely increased. The left atrial volume index is 48.2 mL/m2.     Right Atrium   Right atrium is normal in size.     IVC/SVC   IVC appears normal. There is normal collapse with deep inspiration.     Mitral Valve   Mitral valve structure is normal. The leaflets are mildly thickened. There is mild annular calcification. There is moderate regurgitation. There is no evidence of mitral valve stenosis.     Tricuspid Valve   Tricuspid valve appears to be normal. There is mild regurgitation. The right ventricular systolic pressure is mildly elevated. RVSP calculated at 41 mmHg. RVSP is based on RA pressure of 3 mmHg. There is mild pulmonary hypertension.     Aortic Valve   The aortic valve is trileaflet. The leaflets are mildly calcified. There is mild regurgitation. There is no evidence of aortic valve stenosis.     Pulmonic Valve   Pulmonic valve structure is grossly normal. There is mild regurgitation. There is no evidence of pulmonic valve stenosis. The peak gradient is 5.76 mmHg.     Ascending Aorta   The aortic root is normal in size.     Pericardium   There is no pericardial effusion.   CATH:  Diagnostic  Dominance: Right  Left Main   Size of vessel >=2.0 mm. There is severe disease. The  noncoronary cusp.    Mitral Valve: Moderate regurgitation.    Tricuspid Valve: Mild to moderate regurgitation. The estimated RVSP is 43 mmHg.    Left Atrium: Left atrium is moderately dilated.    Image quality is adequate.    Assessment / Acute Cardiac Problems:   NSTEMI s/p cardiac cath 09/20/2024 showing patent LIMA-LAD, SVG-OM1 and OM 2, SVG-diagonal but occluded SVG to RCA.  Severe disease in RCA(PCI deferred due to advanced age and multiple co morbidities)  Acute hypoxic respiratory failure due to acute on chronic HFiEF  Ischemic cardiomyopathy, EF 40 to 45% on echo from 10/10/2023  Hx of paroxysmal atrial fibrillation, [id number] score 7, not on anticoagulation  Hypertension  Hyperlipidemia  Type 2 diabetes mellitus  Low burden of PVCs on Holter monitor from 01/2024    Patient Active Problem List:     CAD (coronary artery disease)     Angina pectoris (HCC)     HTN (hypertension)     Left lower lobe pneumonia     Pneumonia     Primary hypertension     Hypothyroidism     ASCVD (arteriosclerotic cardiovascular disease)     Chest pain     NSTEMI (non-ST elevated myocardial infarction) (HCC)     Acute on chronic congestive heart failure (HCC)     Hyponatremia     Acute on chronic anemia     Iron deficiency     Hyperlipidemia     Acute hypoxic respiratory failure (HCC)     Paroxysmal A-fib (HCC)     Acute kidney injury superimposed on CKD (HCC)     Ischemic cardiomyopathy     Left bundle branch block      Plan of Treatment:   Continue ASA, Plavix, statin, Imdur and BB  ECHO reviewed. EF 30-35%. Normal wall motion. Abnormal diastolic function. Moderate MR. Mild to moderate TR  Lasix ordered per others  Continue GDMT as tolerated. Continue BB. No ACE/ARB/ARNI d/t worsening creatinine.  Evaluate if candidate for lifevest prior to discharge.   Trop trending  BP stable  HR stable. Continue BB with history of PAF- evaluate AC  Per Dr. Brunner's recommendations- med management at this time.   Will monitor    Electronically

## 2024-09-22 NOTE — CONSULTS
Nephrology Consult Note    Reason for Consult:  AMADO  Requesting Physician:  Enrique Childress*     History Obtained From:  patient, electronic medical record    Chief Complaint:     Chief Complaint   Patient presents with    Chest Pain    Shortness of Breath       History of Present Illness:               This is a 89 y.o. female who presented to the hospital for evaluation of  SOB on 9/19/2024.  Has past medical history significant for CAD status post CABG in 2014, mitral regurgitation, LBBB, hypertension, hypothyroidism, PAF not on anticoagulation, dyslipidemia, HFrEF with most recent echo completed this admission showing a reduced ejection fraction of 30%.  She initially presented to the emergency department for evaluation of chest pain with shortness of breath.  Troponins were significantly elevated and she did meet SIRS criteria.  Cardiology was consulted and cardiac cath was completed on 9/20/2024.  She was also given Zosyn and vancomycin in the emergency department secondary to SIRS criteria being met.  Patient also received CTA chest with IV contrast on 9/20/2024 which was negative for PE.  There was some concern for fluid overload and the patient was started on IV diuresis.    UOP has been suboptimal, 300 mL documented in the last 24 hours    No IV fluids started, patient was administered additional diuretic therapy in the form of IV Lasix    Cannon inserted on    Pt received vanco/NSAID on admission for concern for sepsis    UA showed yellow urine with cloudy turbidity, negative for glucose bilirubin or ketones.  Specific gravity was 1.036 with +3 protein, negative for nitrites, small leukocyte esterase with a pH of 5.5 with no bacteria, 20-50 WBCs, moderate hemoglobin and small leukocyte esterase as mentioned above.  Urine creatinine 136.0, urine sodium 36, urine protein 333 with a ratio of 2.45    No recent dedicated renal imaging    Patient's home medication list reveals the use of Maxide 37.5-25,  that every mistake has been identified and corrected by editing.

## 2024-09-22 NOTE — PLAN OF CARE
Problem: Discharge Planning  Goal: Discharge to home or other facility with appropriate resources  9/22/2024 1559 by Yennifer Cheney RN  Outcome: Progressing  9/22/2024 0308 by Daljit Ma RN  Outcome: Progressing     Problem: Pain  Goal: Verbalizes/displays adequate comfort level or baseline comfort level  9/22/2024 1559 by Yennifer Cheney RN  Outcome: Progressing  9/22/2024 0308 by Daljit Ma RN  Outcome: Progressing     Problem: Skin/Tissue Integrity  Goal: Absence of new skin breakdown  Description: 1.  Monitor for areas of redness and/or skin breakdown  2.  Assess vascular access sites hourly  3.  Every 4-6 hours minimum:  Change oxygen saturation probe site  4.  Every 4-6 hours:  If on nasal continuous positive airway pressure, respiratory therapy assess nares and determine need for appliance change or resting period.  9/22/2024 1559 by Yennifer Cheney RN  Outcome: Progressing  9/22/2024 0308 by Daljit Ma RN  Outcome: Progressing     Problem: Safety - Adult  Goal: Free from fall injury  9/22/2024 1559 by Yennifer Cheney RN  Outcome: Progressing  9/22/2024 0308 by Daljit Ma RN  Outcome: Progressing     Problem: ABCDS Injury Assessment  Goal: Absence of physical injury  9/22/2024 1559 by Yennifer Cheney RN  Outcome: Progressing  9/22/2024 0308 by Daljit Ma RN  Outcome: Progressing     Problem: Chronic Conditions and Co-morbidities  Goal: Patient's chronic conditions and co-morbidity symptoms are monitored and maintained or improved  9/22/2024 1559 by Yennifer Cheney RN  Outcome: Progressing  9/22/2024 0308 by Daljit Ma RN  Outcome: Progressing

## 2024-09-23 ENCOUNTER — APPOINTMENT (OUTPATIENT)
Dept: GENERAL RADIOLOGY | Age: 89
End: 2024-09-23
Payer: MEDICARE

## 2024-09-23 LAB
ALBUMIN PERCENT: 44 % (ref 45–65)
ALBUMIN SERPL-MCNC: 2.7 G/DL (ref 3.2–5.2)
ALPHA 2 PERCENT: 17 % (ref 6–13)
ALPHA1 GLOB SERPL ELPH-MCNC: 0.4 G/DL (ref 0.1–0.4)
ALPHA1 GLOB SERPL ELPH-MCNC: 6 % (ref 3–6)
ALPHA2 GLOB SERPL ELPH-MCNC: 1 G/DL (ref 0.5–0.9)
ANA SER QL IA: NEGATIVE
ANION GAP SERPL CALCULATED.3IONS-SCNC: 14 MMOL/L (ref 9–16)
ANTI-XA UNFRAC HEPARIN: 0.14 IU/L
B-GLOBULIN SERPL ELPH-MCNC: 0.7 G/DL (ref 0.5–1.1)
B-GLOBULIN SERPL ELPH-MCNC: 12 % (ref 11–19)
BASOPHILS # BLD: 0 K/UL (ref 0–0.2)
BASOPHILS NFR BLD: 0 % (ref 0–2)
BUN SERPL-MCNC: 30 MG/DL (ref 8–23)
CALCIUM SERPL-MCNC: 8.6 MG/DL (ref 8.6–10.4)
CHLORIDE SERPL-SCNC: 99 MMOL/L (ref 98–107)
CO2 SERPL-SCNC: 21 MMOL/L (ref 20–31)
CREAT SERPL-MCNC: 2.5 MG/DL (ref 0.5–0.9)
DSDNA IGG SER QL IA: 6.6 IU/ML
EOSINOPHIL # BLD: 0 K/UL (ref 0–0.4)
EOSINOPHILS RELATIVE PERCENT: 0 % (ref 1–4)
ERYTHROCYTE [DISTWIDTH] IN BLOOD BY AUTOMATED COUNT: 14.9 % (ref 11.8–14.4)
GAMMA GLOB SERPL ELPH-MCNC: 1.3 G/DL (ref 0.5–1.5)
GAMMA GLOBULIN %: 21 % (ref 9–20)
GFR, ESTIMATED: 18 ML/MIN/1.73M2
GLUCOSE BLD-MCNC: 154 MG/DL (ref 65–105)
GLUCOSE BLD-MCNC: 183 MG/DL (ref 65–105)
GLUCOSE BLD-MCNC: 201 MG/DL (ref 65–105)
GLUCOSE BLD-MCNC: 222 MG/DL (ref 65–105)
GLUCOSE BLD-MCNC: 224 MG/DL (ref 65–105)
GLUCOSE SERPL-MCNC: 158 MG/DL (ref 74–99)
HCT VFR BLD AUTO: 24.8 % (ref 36.3–47.1)
HGB BLD-MCNC: 8.3 G/DL (ref 11.9–15.1)
IMM GRANULOCYTES # BLD AUTO: 0 K/UL (ref 0–0.3)
IMM GRANULOCYTES NFR BLD: 0 %
ITYP INTERPRETATION: NORMAL
ITYP INTERPRETATION: NORMAL
LYMPHOCYTES NFR BLD: 0.74 K/UL (ref 1–4.8)
LYMPHOCYTES RELATIVE PERCENT: 14 % (ref 24–44)
MAGNESIUM SERPL-MCNC: 2 MG/DL (ref 1.7–2.3)
MCH RBC QN AUTO: 39.3 PG (ref 25.2–33.5)
MCHC RBC AUTO-ENTMCNC: 33.5 G/DL (ref 28.4–34.8)
MCV RBC AUTO: 117.5 FL (ref 82.6–102.9)
MONOCYTES NFR BLD: 0.21 K/UL (ref 0.1–0.8)
MONOCYTES NFR BLD: 4 % (ref 1–7)
MORPHOLOGY: ABNORMAL
MORPHOLOGY: ABNORMAL
NEUTROPHILS NFR BLD: 82 % (ref 36–66)
NEUTS SEG NFR BLD: 4.35 K/UL (ref 1.8–7.7)
NRBC BLD-RTO: 0 PER 100 WBC
NUCLEAR IGG SER IA-RTO: 0.3 U/ML
P E INTERPRETATION, U: NORMAL
PATH REV: NORMAL
PATHOLOGIST REVIEW: NORMAL
PATHOLOGIST: ABNORMAL
PATHOLOGIST: NORMAL
PLATELET # BLD AUTO: 166 K/UL (ref 138–453)
PMV BLD AUTO: 11.7 FL (ref 8.1–13.5)
POTASSIUM SERPL-SCNC: 4.6 MMOL/L (ref 3.7–5.3)
PROT PATTERN SERPL ELPH-IMP: ABNORMAL
PROT SERPL-MCNC: 6.2 G/DL (ref 6.6–8.7)
RBC # BLD AUTO: 2.11 M/UL (ref 3.95–5.11)
SODIUM SERPL-SCNC: 134 MMOL/L (ref 136–145)
SPECIMEN TYPE: NORMAL
SPECIMEN TYPE: NORMAL
SPECIMEN VOL UR: NORMAL ML
TOTAL PROT. SUM,%: 100 % (ref 98–102)
TOTAL PROT. SUM: 6.1 G/DL (ref 6.3–8.2)
TOTAL PROTEIN, URINE: 333 MG/DL
URINE TOTAL PROTEIN: 333 MG/DL
WBC OTHER # BLD: 5.3 K/UL (ref 3.5–11.3)

## 2024-09-23 PROCEDURE — 80048 BASIC METABOLIC PNL TOTAL CA: CPT

## 2024-09-23 PROCEDURE — 2580000003 HC RX 258

## 2024-09-23 PROCEDURE — 6370000000 HC RX 637 (ALT 250 FOR IP): Performed by: STUDENT IN AN ORGANIZED HEALTH CARE EDUCATION/TRAINING PROGRAM

## 2024-09-23 PROCEDURE — 99232 SBSQ HOSP IP/OBS MODERATE 35: CPT | Performed by: INTERNAL MEDICINE

## 2024-09-23 PROCEDURE — 6370000000 HC RX 637 (ALT 250 FOR IP)

## 2024-09-23 PROCEDURE — 71045 X-RAY EXAM CHEST 1 VIEW: CPT

## 2024-09-23 PROCEDURE — 97162 PT EVAL MOD COMPLEX 30 MIN: CPT

## 2024-09-23 PROCEDURE — 97530 THERAPEUTIC ACTIVITIES: CPT

## 2024-09-23 PROCEDURE — 85025 COMPLETE CBC W/AUTO DIFF WBC: CPT

## 2024-09-23 PROCEDURE — 6360000002 HC RX W HCPCS: Performed by: INTERNAL MEDICINE

## 2024-09-23 PROCEDURE — 83735 ASSAY OF MAGNESIUM: CPT

## 2024-09-23 PROCEDURE — 2580000003 HC RX 258: Performed by: STUDENT IN AN ORGANIZED HEALTH CARE EDUCATION/TRAINING PROGRAM

## 2024-09-23 PROCEDURE — 85520 HEPARIN ASSAY: CPT

## 2024-09-23 PROCEDURE — 6360000002 HC RX W HCPCS

## 2024-09-23 PROCEDURE — 6370000000 HC RX 637 (ALT 250 FOR IP): Performed by: NURSE PRACTITIONER

## 2024-09-23 PROCEDURE — 99233 SBSQ HOSP IP/OBS HIGH 50: CPT | Performed by: NURSE PRACTITIONER

## 2024-09-23 PROCEDURE — 82947 ASSAY GLUCOSE BLOOD QUANT: CPT

## 2024-09-23 PROCEDURE — 2060000000 HC ICU INTERMEDIATE R&B

## 2024-09-23 PROCEDURE — 36415 COLL VENOUS BLD VENIPUNCTURE: CPT

## 2024-09-23 RX ORDER — HYDRALAZINE HYDROCHLORIDE 20 MG/ML
10 INJECTION INTRAMUSCULAR; INTRAVENOUS EVERY 6 HOURS PRN
Status: DISCONTINUED | OUTPATIENT
Start: 2024-09-23 | End: 2024-09-27 | Stop reason: HOSPADM

## 2024-09-23 RX ORDER — METOPROLOL TARTRATE 50 MG
50 TABLET ORAL 2 TIMES DAILY
Status: DISCONTINUED | OUTPATIENT
Start: 2024-09-23 | End: 2024-09-26

## 2024-09-23 RX ORDER — FUROSEMIDE 10 MG/ML
80 INJECTION INTRAMUSCULAR; INTRAVENOUS 2 TIMES DAILY
Status: DISCONTINUED | OUTPATIENT
Start: 2024-09-23 | End: 2024-09-24

## 2024-09-23 RX ORDER — ISOSORBIDE MONONITRATE 60 MG/1
60 TABLET, EXTENDED RELEASE ORAL 2 TIMES DAILY
Status: DISCONTINUED | OUTPATIENT
Start: 2024-09-23 | End: 2024-09-27 | Stop reason: HOSPADM

## 2024-09-23 RX ORDER — NITROGLYCERIN 0.4 MG/1
0.4 TABLET SUBLINGUAL EVERY 5 MIN PRN
Status: DISCONTINUED | OUTPATIENT
Start: 2024-09-23 | End: 2024-09-27 | Stop reason: HOSPADM

## 2024-09-23 RX ORDER — CYANOCOBALAMIN 1000 UG/ML
1000 INJECTION, SOLUTION INTRAMUSCULAR; SUBCUTANEOUS ONCE
Status: COMPLETED | OUTPATIENT
Start: 2024-09-23 | End: 2024-09-23

## 2024-09-23 RX ADMIN — Medication 5 MG: at 22:01

## 2024-09-23 RX ADMIN — INSULIN LISPRO 2 UNITS: 100 INJECTION, SOLUTION INTRAVENOUS; SUBCUTANEOUS at 13:14

## 2024-09-23 RX ADMIN — SODIUM CHLORIDE, PRESERVATIVE FREE 10 ML: 5 INJECTION INTRAVENOUS at 22:01

## 2024-09-23 RX ADMIN — METOPROLOL TARTRATE 50 MG: 50 TABLET, FILM COATED ORAL at 08:51

## 2024-09-23 RX ADMIN — ONDANSETRON 4 MG: 2 INJECTION INTRAMUSCULAR; INTRAVENOUS at 13:05

## 2024-09-23 RX ADMIN — NITROGLYCERIN 0.4 MG: 0.4 TABLET SUBLINGUAL at 08:49

## 2024-09-23 RX ADMIN — FAMOTIDINE 20 MG: 20 TABLET, FILM COATED ORAL at 08:52

## 2024-09-23 RX ADMIN — SODIUM CHLORIDE 125 MG: 9 INJECTION, SOLUTION INTRAVENOUS at 13:14

## 2024-09-23 RX ADMIN — ROPINIROLE HYDROCHLORIDE 0.25 MG: 0.25 TABLET, FILM COATED ORAL at 13:26

## 2024-09-23 RX ADMIN — ROPINIROLE HYDROCHLORIDE 0.25 MG: 0.25 TABLET, FILM COATED ORAL at 22:00

## 2024-09-23 RX ADMIN — FUROSEMIDE 80 MG: 10 INJECTION, SOLUTION INTRAMUSCULAR; INTRAVENOUS at 18:25

## 2024-09-23 RX ADMIN — LEVOTHYROXINE SODIUM 125 MCG: 75 TABLET ORAL at 08:51

## 2024-09-23 RX ADMIN — INSULIN LISPRO 2 UNITS: 100 INJECTION, SOLUTION INTRAVENOUS; SUBCUTANEOUS at 18:25

## 2024-09-23 RX ADMIN — SODIUM CHLORIDE, PRESERVATIVE FREE 10 ML: 5 INJECTION INTRAVENOUS at 22:02

## 2024-09-23 RX ADMIN — ATORVASTATIN CALCIUM 40 MG: 40 TABLET, FILM COATED ORAL at 22:01

## 2024-09-23 RX ADMIN — FUROSEMIDE 80 MG: 10 INJECTION, SOLUTION INTRAMUSCULAR; INTRAVENOUS at 13:16

## 2024-09-23 RX ADMIN — SODIUM CHLORIDE, PRESERVATIVE FREE 10 ML: 5 INJECTION INTRAVENOUS at 08:53

## 2024-09-23 RX ADMIN — ROPINIROLE HYDROCHLORIDE 0.25 MG: 0.25 TABLET, FILM COATED ORAL at 08:52

## 2024-09-23 RX ADMIN — SODIUM CHLORIDE, PRESERVATIVE FREE 10 ML: 5 INJECTION INTRAVENOUS at 08:54

## 2024-09-23 RX ADMIN — ASPIRIN 81 MG 81 MG: 81 TABLET ORAL at 08:51

## 2024-09-23 RX ADMIN — ISOSORBIDE MONONITRATE 60 MG: 60 TABLET, EXTENDED RELEASE ORAL at 08:52

## 2024-09-23 RX ADMIN — CLOPIDOGREL BISULFATE 75 MG: 75 TABLET ORAL at 08:52

## 2024-09-23 RX ADMIN — METOPROLOL TARTRATE 50 MG: 50 TABLET, FILM COATED ORAL at 22:01

## 2024-09-23 RX ADMIN — INSULIN LISPRO 2 UNITS: 100 INJECTION, SOLUTION INTRAVENOUS; SUBCUTANEOUS at 22:00

## 2024-09-23 RX ADMIN — CYANOCOBALAMIN 1000 MCG: 1000 INJECTION, SOLUTION INTRAMUSCULAR at 13:25

## 2024-09-23 RX ADMIN — ISOSORBIDE MONONITRATE 60 MG: 60 TABLET, EXTENDED RELEASE ORAL at 22:00

## 2024-09-23 ASSESSMENT — PAIN SCALES - GENERAL: PAINLEVEL_OUTOF10: 6

## 2024-09-23 NOTE — PROGRESS NOTES
Renal Progress Note    Patient :  Rossana Alcantara; 89 y.o. MRN# [id number]  Location:  0421/0421-02  Attending:  Kitty Licona MD  Admit Date:  9/19/2024   Hospital Day: 3      Subjective:     Oral intake good.  Urine output fair although in positive fluid balance.  Complaining of shortness of breath.  This morning patient was annoyed by the beeping sound of for IV pump.  It woke her up and made her irritable.  Tells me she became anxious and blood pressure went up to 190 systolic.  Did have worsening shortness of breath.  Labs from today pending yesterday creatinine was 2.3.  Has developed acute kidney injury from contrast exposure.  Cardiac cath done which showed patent grafts except occluded graft to the RCA and significant distal disease.  For medical therapy at this stage.  Cardiomyopathy noted ejection fraction 30%.  Ultrasound shows echogenic kidneys bilaterally.  UPC 2.4.    History reviewed  Known history of coronary artery disease history of coronary bypass graft, chronic kidney disease baseline 1.1-1.2, hypertension, hypothyroidism, cardiomyopathy ejection fraction 30%.  Came into the hospital with complaints of chest pain and shortness of breath.  Troponins were significantly elevated.  She received contrast on admission for CT PE and CT abdomen.  Was negative for PE.  She is underwent cardiac cath which showed occluded RCA graft with significant distal disease.  Rest grafts were patent.  Creatinine from baseline of 1.2 went up to 2.3.  Nephrology consulted for acute kidney injury.      Outpatient Medications:     Medications Prior to Admission: albuterol sulfate HFA (PROVENTIL HFA) 108 (90 Base) MCG/ACT inhaler, Inhale 2 puffs into the lungs every 6 hours as needed for Wheezing  allopurinol (ZYLOPRIM) 300 MG tablet, Take 300 mg by mouth daily  atorvastatin (LIPITOR) 10 MG tablet, Take 10 mg by mouth daily (Patient not taking: Reported on 4/4/2022)  estradiol (ESTRACE) 0.5 MG tablet, Take 0.5 mg by  LEUKOCYTESUR SMALL 09/22/2024 06:52 AM    UROBILINOGEN Normal 09/22/2024 06:52 AM    BILIRUBINUR NEGATIVE 09/22/2024 06:52 AM    GLUCOSEU NEGATIVE 09/22/2024 06:52 AM    KETUA NEGATIVE 09/22/2024 06:52 AM    AMORPHOUS NOT REPORTED 05/08/2014 11:02 AM     Urine Sodium:   No components found for: \"BENEDICTO\"  Urine Potassium:  No results found for: \"KUR\"  Urine Chloride:    Lab Results   Component Value Date/Time    CLUR 28 09/22/2024 06:52 AM     Urine Osmolarity:   Lab Results   Component Value Date/Time    OSMOU 455 09/22/2024 06:52 AM     Urine Protein:   No components found for: \"TOTALPROTEIN\", \"URINE\"   Urine Creatinine:   No results found for: \"LABCREA\"  Urine Eosinophils:  No components found for: \"UEOS\"    Radiology:     CXR:     Assessment:     1. Acute Kidney Injury: Secondary to nephrotoxic ATN from contrast exposure, received cardiac catheterization 9/20/2024 also received CT PE on 9/20/2024 baseline 1.2 peaked up to 2.3 creatinine from today pending urine output has been suboptimal.  Process appears to be evolving.  2.  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  Non-ST elevation MI/acute coronary syndrome cardiac cath done on 9/20/2024 for medical therapy at present  4.  Ischemic cardiomyopathy ejection fraction 30%  5.  Uncontrolled hypertension likely triggered by anxiety  6.  Decompensated left heart failure secondary to above and acute kidney injury further aggravated by hypotension  7.  Paroxysmal atrial fibrillation on any anticoagulation    Plan:   1.  Start Lasix 80 mg IV twice a day  2.  Strict intake output record Daily weights  3.  Check BMP daily  4.  Discussed with patient need for renal replacement therapy if renal function does not improve, she is vehemently opposed and does not wish to start dialysis under any circumstances  5.  Will respect patient's wishes  6.  Will follow    Nutrition   Please ensure that patient is on a

## 2024-09-23 NOTE — PLAN OF CARE
[facility]  Heart Team Discussion        Patients current medical condition, laboratory and radiographic findings as well as recommendations of physicians consulted on the case discussed in heart team conference.  Dr Brunner, Dr Scar Diana and Dr Hatch in attendance. Due to comorbidites and age, we will opt for Guideline Directed Medical Therapy at this time with outpatient follow up.     Yan Adams MD  [employer] Fellow

## 2024-09-23 NOTE — PROGRESS NOTES
[facility]  Internal Medicine Teaching Residency Program  Inpatient Daily Progress Note  ______________________________________________________________________________    Patient: Rossana Alcantara  YOB: 1930   MRN:[id number]    Acct: [id number]     Room: [facility]/0421-02  Admit date: 9/19/2024  Today's date: 09/23/24  Number of days in the hospital: 3    SUBJECTIVE   Admitting Diagnosis: NSTEMI (non-ST elevated myocardial infarction) (HCC)  CC: chest pain     ICU transfer / step down     Pt examined at bedside. Chart & results reviewed.   Generally well, no active concerns or symptoms   Afebrile and vitals stable - sat 95 to 98 % on 1 L - wean off   Output recorded as only 850 ml     No further episodes of chest pains, mobilized to the toilet yesterday, slight light-headedness - no other symptoms. E&D fine. No N&V or abdo pain.     Bloods pending today - levels from yesterday   Trop: 123 > 292 > 1592 > 1611 > 997 > 1305 > 1882 > 1725 > 2080 > 2116   BNP 47,176   Creat: 1.2 > 1.6 > 2.3   BUN:  22 > 24 > 28   Na: 133   Echo - 9/21/24 - Mildly reduced LVSF with EF 30-35 %, mild concentric LVH, mild to mod TR - RVSP 43 mmHg       ROS:  Constitutional:  negative for chills, fevers, sweats  Respiratory:  negative for cough, dyspnea on exertion, hemoptysis, wheezing  Cardiovascular:  negative for chest pain, chest pressure/discomfort, lower extremity edema, palpitations  Gastrointestinal:  negative for abdominal pain, constipation, diarrhea, nausea, vomiting  Neurological:  negative for dizziness, headache  BRIEF HISTORY   Rossana Alcantara is 89-year-old female whose history includes CAD s/p CABG in 2014, mitral regurgitation, LBBB, hypertension, hyperlipidemia, CHF, hypothyroidism and paroxysmal AF not on anticoagulation.  She woke up on Thursday morning, 9/19/2024, and was feeling fatigued throughout the day.  She started to experience extreme tiredness around  creatinine around 1 - 1.2  Creatinine on this admission: 1.2 > 1.3 > 1.6 > 2.3 on 9/20/2024  Hypervolemic on examination  Likely explanation contrast vs cardiorenal - BUN/creat ratio low    Monitor creatinine and BMP  Protein:creatinine ratio requested - pending   UA - small leukocytes, Protein 3+, hyaline cast   Trial Lasix and monitor BMP   Renal consult  Renal US - hyperechoic b/l renal cortex suggestive of underlying parenchymal disease     History of paroxysmal atrial fibrillation:  [id number] score 7 - patient not on anticoagulation -patient informs that did not start anticoagulation previously due to high cost, explained the benefits and the likelihood of affordable costing now -willing to consider if the pricing is low -will check with pharmacy  Heart rate around 69-95  Currently on metoprolol 50 mg OD    Acute hypoxic respiratory failure:  Improving - saturating 97 to 100 % on 2 L NC  Wean off oxygen to 1L   CTPA - no PE, patchy ground-glass infiltrates throughout the lungs b/l with trace pleural effusions - findings are non-specific and could be seen with pulmonary edema or multifocal pneumonia, with a pattern that could be seen with NSIP   Likely pulmonary edema      Acute on chronic anemia:  Hb 9.7 > 8.9 > 8.4 > 8.7 > 7.7 on 9/22/24   , WBC 5 and Plts 147   Folate, B12 and TSH normal   Iron low - iron 25 and saturation 11 %   Start iron replacement     Hyperlipidemia  On Lipitor 40 mg    Hypothyroidism  Normally on synthroid 125 mcg - continue   TSH on 9/20/24 - 1.16     Mild Hyponatremia:  Na: 134 > 135 > 133  Monitor   Check urine Osm and await urine Na     Other medical conditions include hypertension - not on any medications due to orthostatic hypotension and Diabetes - no A1c and not on medications.     DVT ppx : Heparin  GI ppx: Famotidine     PT/OT:   Discharge Planning / SW: Likely back to home once symptoms, oxygen requirement and kidney function improve     Horace Javed MD  Internal

## 2024-09-23 NOTE — PROGRESS NOTES
Physical Therapy  Facility/Department: [address] STEPDOWN  Physical Therapy Initial Assessment    Name: Rossana Alcantara  : 1930  MRN: [id number]  Date of Service: 2024  Chief Complaint   Patient presents with    Chest Pain    Shortness of Breath      Discharge Recommendations:  Patient would benefit from continued therapy after discharge Further therapy recommended at discharge.The patient should be able to tolerate at least 3 hours of therapy per day over 5 days or 15 hours over 7 days.   This patient may benefit from a Physical Medicine and Rehab consult.    PT Equipment Recommendations  Equipment Needed: No (Pt reports owning a RW, writer recommends  use of RW.)      Patient Diagnosis(es): The primary encounter diagnosis was Acute on chronic congestive heart failure, unspecified heart failure type ([facility]). Diagnoses of Troponin level elevated and NSTEMI (non-ST elevated myocardial infarction) ([facility]) were also pertinent to this visit.  Past Medical History:  has a past medical history of Accelerating angina ([facility]), Arthritis, CAD (coronary artery disease), Carotid bruit, DM (diabetes mellitus) ([facility]), HTN (hypertension), Hypothyroid, Mitral regurgitation, Paroxysmal A-fib ([facility]), and Pericarditis.  Past Surgical History:  has a past surgical history that includes Cardiac catheterization; Cholecystectomy; Hysterectomy; shoulder surgery; Tonsillectomy; Breast lumpectomy; and Coronary artery bypass graft (2014).    Assessment  Body Structures, Functions, Activity Limitations Requiring Skilled Therapeutic Intervention: Decreased functional mobility ;Decreased endurance;Decreased balance;Decreased strength;Decreased coordination  Assessment: Pt with mobility deficits requiring CGA to ambulate 20 feet with a RW.  Pt SOB with ambulation and standing mobility this date with respiration rate up to 30 breaths/minute, SpO2 remains >90% throughout.  Prior to this admission, pt was able to ambulate in the

## 2024-09-23 NOTE — PROGRESS NOTES
[facility]   Progress Note                   Date:   9/23/2024  Patient name: Rossana Alcantara  Date of admission:  9/19/2024 10:57 PM  MRN:   [id number]  YOB: 1930  PCP: Modesto Ayala MD    Reason for Admission: NSTEMI (non-ST elevated myocardial infarction) ([facility]) [I21.4]  Troponin level elevated [R79.89]  Acute on chronic congestive heart failure, unspecified heart failure type ([facility]) [I50.9]    Subjective:       Clinical Changes / Abnormalities: Pt seen and examined in the room.  Patient resting in bed. Pt denies any CP or sob now but does have occ chest pain.   Labs, vitals and tele reviewed- SR 73  Heparin drip off     Medications:   Scheduled Meds:   metoprolol tartrate  50 mg Oral BID    [Held by provider] furosemide  20 mg IntraVENous Daily    famotidine  20 mg Oral Daily    rOPINIRole  0.25 mg Oral TID    insulin lispro  0-8 Units SubCUTAneous 4x Daily AC & HS    levothyroxine  125 mcg Oral Daily    [Held by provider] metoprolol succinate  50 mg Oral Daily    isosorbide mononitrate  60 mg Oral Daily    sodium chloride flush  5-40 mL IntraVENous 2 times per day    melatonin  5 mg Oral Nightly    sodium chloride flush  5-40 mL IntraVENous 2 times per day    atorvastatin  40 mg Oral Daily    clopidogrel  75 mg Oral Daily    aspirin  81 mg Oral Daily     Continuous Infusions:   heparin (PORCINE) Infusion 16 Units/kg/hr (09/22/24 0845)    sodium chloride      dextrose      sodium chloride       CBC:   Recent Labs     09/21/24  0243 09/22/24  0250   WBC 4.7 5.0   HGB 8.7* 7.7*    147     BMP:    Recent Labs     09/21/24  0243 09/22/24  0250   * 133*   K 4.4 4.3   CL 99 99   CO2 22 25   BUN 24* 28*   CREATININE 1.6* 2.3*   GLUCOSE 178* 139*     Hepatic:   No results for input(s): \"AST\", \"ALT\", \"BILITOT\", \"ALKPHOS\" in the last 72 hours.    Invalid input(s): \"ALB\"    Troponin:   Recent Labs     09/21/24  1753 09/22/24  0250 09/22/24  0836   TROPHS 1,725* 2,080*  2,116*     BNP: No results for input(s): \"BNP\" in the last 72 hours.  Lipids: No results for input(s): \"CHOL\", \"HDL\" in the last 72 hours.    Invalid input(s): \"LDLCALCU\"  INR:   No results for input(s): \"INR\" in the last 72 hours.      Objective:   Vitals: BP (!) 147/66   Pulse 73   Temp 97.5 °F (36.4 °C) (Temporal)   Resp 27   Ht 1.651 m (5' 5\")   Wt 74.5 kg (164 lb 3.9 oz)   SpO2 95%   BMI 27.33 kg/m²   General appearance: alert and cooperative with exam  HEENT: Head: Normocephalic, no lesions, without obvious abnormality.  Neck: no JVD, trachea midline, no adenopathy  Lungs: Clear to auscultation  Heart: Regular rate and rhythm, s1/s2 auscultated, no murmurs  Abdomen: soft, non-tender, bowel sounds active  Extremities: no edema  Neurologic: not done    EKG:  Normal sinus rhythm  Left axis deviation  Left bundle branch block  Abnormal ECG  When compared with ECG of 19-SEP-2024 23:03,  QRS axis Shifted left  T wave inversion no longer evident in Inferior leads     ECHO: 10/10/2023    Left Ventricle: Left ventricle appears normal in size. There is   moderate increased wall thickness/hypertrophy. Systolic function is mildly   to moderately decreased with an ejection fraction of 40-45%.     Left Atrium: Left atrium volume index is severely increased. The left   atrial volume index is 48.2 mL/m2.     Right Ventricle: Right ventricular size appears normal. The right   ventricular basal diameter is 24.0 mm. Systolic function is normal. Normal   tricuspid annular plane systolic excursion.     Aortic Valve: The aortic valve is trileaflet. The leaflets are mildly   calcified. There is mild regurgitation. There is no evidence of aortic   valve stenosis.     Mitral Valve: Mitral valve structure is normal. The leaflets are mildly   thickened. There is mild annular calcification. There is moderate   regurgitation. There is no evidence of mitral valve stenosis.     Tricuspid Valve: There is mild regurgitation. The right

## 2024-09-23 NOTE — PLAN OF CARE
Problem: Discharge Planning  Goal: Discharge to home or other facility with appropriate resources  9/22/2024 2225 by Ree Hollins RN  Outcome: Progressing  9/22/2024 1559 by Yennifer Cheney RN  Outcome: Progressing     Problem: Pain  Goal: Verbalizes/displays adequate comfort level or baseline comfort level  9/22/2024 2225 by Ree Hollins RN  Outcome: Progressing  9/22/2024 1559 by Yennifer Cheney RN  Outcome: Progressing     Problem: Skin/Tissue Integrity  Goal: Absence of new skin breakdown  Description: 1.  Monitor for areas of redness and/or skin breakdown  2.  Assess vascular access sites hourly  3.  Every 4-6 hours minimum:  Change oxygen saturation probe site  4.  Every 4-6 hours:  If on nasal continuous positive airway pressure, respiratory therapy assess nares and determine need for appliance change or resting period.  9/22/2024 2225 by Ree Hollins RN  Outcome: Progressing  9/22/2024 1559 by Yennifer Cheney RN  Outcome: Progressing     Problem: Safety - Adult  Goal: Free from fall injury  9/22/2024 2225 by Ree Hollins RN  Outcome: Progressing  9/22/2024 1559 by Yennifer Cheney RN  Outcome: Progressing     Problem: ABCDS Injury Assessment  Goal: Absence of physical injury  9/22/2024 2225 by Ree Hollins RN  Outcome: Progressing  9/22/2024 1559 by Yennifer Cheney RN  Outcome: Progressing     Problem: Chronic Conditions and Co-morbidities  Goal: Patient's chronic conditions and co-morbidity symptoms are monitored and maintained or improved  9/22/2024 2225 by Ree Hollins RN  Outcome: Progressing  9/22/2024 1559 by Yennifer Cheney RN  Outcome: Progressing

## 2024-09-24 LAB
ANION GAP SERPL CALCULATED.3IONS-SCNC: 14 MMOL/L (ref 9–16)
ANTI-XA UNFRAC HEPARIN: <0.1 IU/L
BASOPHILS # BLD: 0 K/UL (ref 0–0.2)
BASOPHILS NFR BLD: 0 % (ref 0–2)
BUN SERPL-MCNC: 36 MG/DL (ref 8–23)
CALCIUM SERPL-MCNC: 8.9 MG/DL (ref 8.6–10.4)
CHLORIDE SERPL-SCNC: 98 MMOL/L (ref 98–107)
CO2 SERPL-SCNC: 22 MMOL/L (ref 20–31)
CREAT SERPL-MCNC: 2.5 MG/DL (ref 0.5–0.9)
EOSINOPHIL # BLD: 0.05 K/UL (ref 0–0.44)
EOSINOPHILS RELATIVE PERCENT: 1 % (ref 1–4)
ERYTHROCYTE [DISTWIDTH] IN BLOOD BY AUTOMATED COUNT: 15.1 % (ref 11.8–14.4)
GFR, ESTIMATED: 18 ML/MIN/1.73M2
GLUCOSE BLD-MCNC: 127 MG/DL (ref 65–105)
GLUCOSE BLD-MCNC: 139 MG/DL (ref 65–105)
GLUCOSE BLD-MCNC: 168 MG/DL (ref 65–105)
GLUCOSE BLD-MCNC: 170 MG/DL (ref 65–105)
GLUCOSE SERPL-MCNC: 120 MG/DL (ref 74–99)
HCT VFR BLD AUTO: 24.6 % (ref 36.3–47.1)
HGB BLD-MCNC: 7.8 G/DL (ref 11.9–15.1)
IMM GRANULOCYTES # BLD AUTO: 0 K/UL (ref 0–0.3)
IMM GRANULOCYTES NFR BLD: 0 %
LYMPHOCYTES NFR BLD: 1.04 K/UL (ref 1.1–3.7)
LYMPHOCYTES RELATIVE PERCENT: 20 % (ref 24–43)
MAGNESIUM SERPL-MCNC: 2 MG/DL (ref 1.7–2.3)
MCH RBC QN AUTO: 39.8 PG (ref 25.2–33.5)
MCHC RBC AUTO-ENTMCNC: 31.7 G/DL (ref 28.4–34.8)
MCV RBC AUTO: 125.5 FL (ref 82.6–102.9)
MONOCYTES NFR BLD: 0.26 K/UL (ref 0.1–1.2)
MONOCYTES NFR BLD: 5 % (ref 3–12)
MORPHOLOGY: ABNORMAL
MORPHOLOGY: ABNORMAL
NEUTROPHILS NFR BLD: 74 % (ref 36–65)
NEUTS SEG NFR BLD: 3.85 K/UL (ref 1.5–8.1)
NRBC BLD-RTO: 0.4 PER 100 WBC
PLATELET # BLD AUTO: 154 K/UL (ref 138–453)
PMV BLD AUTO: 11.3 FL (ref 8.1–13.5)
POTASSIUM SERPL-SCNC: 4.6 MMOL/L (ref 3.7–5.3)
RBC # BLD AUTO: 1.96 M/UL (ref 3.95–5.11)
SODIUM SERPL-SCNC: 134 MMOL/L (ref 136–145)
WBC OTHER # BLD: 5.2 K/UL (ref 3.5–11.3)

## 2024-09-24 PROCEDURE — 36415 COLL VENOUS BLD VENIPUNCTURE: CPT

## 2024-09-24 PROCEDURE — 2060000000 HC ICU INTERMEDIATE R&B

## 2024-09-24 PROCEDURE — 6360000002 HC RX W HCPCS: Performed by: INTERNAL MEDICINE

## 2024-09-24 PROCEDURE — 6370000000 HC RX 637 (ALT 250 FOR IP)

## 2024-09-24 PROCEDURE — 97530 THERAPEUTIC ACTIVITIES: CPT

## 2024-09-24 PROCEDURE — 85025 COMPLETE CBC W/AUTO DIFF WBC: CPT

## 2024-09-24 PROCEDURE — 2580000003 HC RX 258: Performed by: STUDENT IN AN ORGANIZED HEALTH CARE EDUCATION/TRAINING PROGRAM

## 2024-09-24 PROCEDURE — 6370000000 HC RX 637 (ALT 250 FOR IP): Performed by: STUDENT IN AN ORGANIZED HEALTH CARE EDUCATION/TRAINING PROGRAM

## 2024-09-24 PROCEDURE — 2580000003 HC RX 258

## 2024-09-24 PROCEDURE — 99232 SBSQ HOSP IP/OBS MODERATE 35: CPT | Performed by: INTERNAL MEDICINE

## 2024-09-24 PROCEDURE — 82947 ASSAY GLUCOSE BLOOD QUANT: CPT

## 2024-09-24 PROCEDURE — 6360000002 HC RX W HCPCS

## 2024-09-24 PROCEDURE — 97535 SELF CARE MNGMENT TRAINING: CPT

## 2024-09-24 PROCEDURE — 6370000000 HC RX 637 (ALT 250 FOR IP): Performed by: NURSE PRACTITIONER

## 2024-09-24 PROCEDURE — 80048 BASIC METABOLIC PNL TOTAL CA: CPT

## 2024-09-24 PROCEDURE — 83735 ASSAY OF MAGNESIUM: CPT

## 2024-09-24 PROCEDURE — 99232 SBSQ HOSP IP/OBS MODERATE 35: CPT | Performed by: STUDENT IN AN ORGANIZED HEALTH CARE EDUCATION/TRAINING PROGRAM

## 2024-09-24 PROCEDURE — 99233 SBSQ HOSP IP/OBS HIGH 50: CPT | Performed by: INTERNAL MEDICINE

## 2024-09-24 PROCEDURE — 85520 HEPARIN ASSAY: CPT

## 2024-09-24 RX ORDER — FUROSEMIDE 10 MG/ML
80 INJECTION INTRAMUSCULAR; INTRAVENOUS DAILY
Status: DISCONTINUED | OUTPATIENT
Start: 2024-09-24 | End: 2024-09-26

## 2024-09-24 RX ORDER — OXYCODONE AND ACETAMINOPHEN 5; 325 MG/1; MG/1
1 TABLET ORAL EVERY 6 HOURS PRN
Status: DISCONTINUED | OUTPATIENT
Start: 2024-09-24 | End: 2024-09-27 | Stop reason: HOSPADM

## 2024-09-24 RX ORDER — AMLODIPINE BESYLATE 5 MG/1
2.5 TABLET ORAL NIGHTLY
Status: DISCONTINUED | OUTPATIENT
Start: 2024-09-24 | End: 2024-09-26

## 2024-09-24 RX ORDER — AMLODIPINE BESYLATE 5 MG/1
5 TABLET ORAL DAILY
Status: DISCONTINUED | OUTPATIENT
Start: 2024-09-24 | End: 2024-09-24

## 2024-09-24 RX ADMIN — ASPIRIN 81 MG 81 MG: 81 TABLET ORAL at 10:09

## 2024-09-24 RX ADMIN — CLOPIDOGREL BISULFATE 75 MG: 75 TABLET ORAL at 10:10

## 2024-09-24 RX ADMIN — METOPROLOL TARTRATE 50 MG: 50 TABLET, FILM COATED ORAL at 10:09

## 2024-09-24 RX ADMIN — FAMOTIDINE 20 MG: 20 TABLET, FILM COATED ORAL at 10:09

## 2024-09-24 RX ADMIN — AMLODIPINE BESYLATE 2.5 MG: 5 TABLET ORAL at 20:21

## 2024-09-24 RX ADMIN — Medication 5 MG: at 20:20

## 2024-09-24 RX ADMIN — SODIUM CHLORIDE, PRESERVATIVE FREE 10 ML: 5 INJECTION INTRAVENOUS at 21:54

## 2024-09-24 RX ADMIN — FUROSEMIDE 80 MG: 10 INJECTION, SOLUTION INTRAMUSCULAR; INTRAVENOUS at 10:11

## 2024-09-24 RX ADMIN — ROPINIROLE HYDROCHLORIDE 0.25 MG: 0.25 TABLET, FILM COATED ORAL at 20:20

## 2024-09-24 RX ADMIN — SODIUM CHLORIDE 125 MG: 9 INJECTION, SOLUTION INTRAVENOUS at 12:07

## 2024-09-24 RX ADMIN — ROPINIROLE HYDROCHLORIDE 0.25 MG: 0.25 TABLET, FILM COATED ORAL at 13:30

## 2024-09-24 RX ADMIN — ROPINIROLE HYDROCHLORIDE 0.25 MG: 0.25 TABLET, FILM COATED ORAL at 10:10

## 2024-09-24 RX ADMIN — METOPROLOL TARTRATE 50 MG: 50 TABLET, FILM COATED ORAL at 20:21

## 2024-09-24 RX ADMIN — LEVOTHYROXINE SODIUM 125 MCG: 75 TABLET ORAL at 10:08

## 2024-09-24 RX ADMIN — SODIUM CHLORIDE, PRESERVATIVE FREE 10 ML: 5 INJECTION INTRAVENOUS at 10:11

## 2024-09-24 RX ADMIN — SODIUM CHLORIDE, PRESERVATIVE FREE 10 ML: 5 INJECTION INTRAVENOUS at 20:20

## 2024-09-24 RX ADMIN — ATORVASTATIN CALCIUM 40 MG: 40 TABLET, FILM COATED ORAL at 20:20

## 2024-09-24 RX ADMIN — ISOSORBIDE MONONITRATE 60 MG: 60 TABLET, EXTENDED RELEASE ORAL at 20:20

## 2024-09-24 RX ADMIN — ISOSORBIDE MONONITRATE 60 MG: 60 TABLET, EXTENDED RELEASE ORAL at 10:09

## 2024-09-24 NOTE — CARE COORDINATION
Attempted to meet with patient to discuss transitional planning but patient currently with multiple physicians at bedside and nurse checking orthostatic BP's. CM will come back as time allows

## 2024-09-24 NOTE — CARE COORDINATION
Acute Inpatient Rehabilitation referral received via Fax    \"Inpatient Consult to PM&R - Physiatry [[id number]]\" Consult Order Status: Verified as ordered, patient confirmed on consult list    PM&R physiatrist Dr. Gustavo Burrell on service for PM&R consult.    Updated STVZ 4B CM: Via Voicemail with call back number at this time at phone/extension: [phone]

## 2024-09-24 NOTE — PROGRESS NOTES
NEPHROLOGY PROGRESS NOTE      ASSESSMENT     Acute kidney injury secondary to nephrotoxic ATN from contrast exposure -creatinine.  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  No therapeutic intervention.  Ischemic cardiomyopathy EF 30% with prior history of CABG  Hypertension  Paroxysmal atrial fibrillation    PLAN     Reduce Lasix to once a day  Avoid nephrotoxins  Anticipate improvement in renal function  Okay to discharge tomorrow if renal function plateaus/improving      SUBJECTIVE   Known case of CKD stage III with baseline creatinine 1.2-1.4/CABG presented with shortness of breath and chest pain secondary to NSTEMI and cardiorenal syndrome type I.  Underwent coronary angiogram which revealed occluded RCA graft with significant distal RCA disease.  No therapeutic intervention was performed.  Hospital course complicated by worsening of renal function from contrast exposure prompting nephrology consultation.    Postprocedure she has gone into acute heart failure decompensated and receiving Lasix.  Symptoms have improved significantly.  Currently breathing 100% on room air.  No use of noninvasive ventilation.  No hemodynamic compromise.  Renal function seems to have plateaued      OBJECTIVE     Vitals:    09/23/24 2300 09/24/24 0221 09/24/24 0331 09/24/24 0811   BP: (!) 149/72  128/60 (!) 154/60   Pulse: 76  73    Resp: 18  20    Temp: 97.7 °F (36.5 °C)  97.5 °F (36.4 °C) 97.7 °F (36.5 °C)   TempSrc: Oral  Oral Axillary   SpO2: 98%  97%    Weight:  74.4 kg (164 lb 0.4 oz)     Height:         24HR INTAKE/OUTPUT:    Intake/Output Summary (Last 24 hours) at 9/24/2024 1002  Last data filed at 9/23/2024 2133  Gross per 24 hour   Intake 420 ml   Output 1450 ml   Net -1030 ml       General appearance:Awake, alert, in no acute distress  HEENT: PERRLA  Respiratory::vesicular breath

## 2024-09-24 NOTE — CARE COORDINATION
Met with patient and family to discuss transitional planning. Patient requested referral to [facility] ARU. CM also discussed back up choices for SNF if patient is not appropriate for ARU and/or if insurance will not approve. Patient and family provided freedom of choice SNF list to review.

## 2024-09-24 NOTE — PROGRESS NOTES
[facility]  Internal Medicine Teaching Residency Program  Inpatient Daily Progress Note  ______________________________________________________________________________    Patient: Rossana Alcantara  YOB: 1930   MRN:[id number]    Acct: [id number]     [id number]  Admit date: 9/19/2024  Today's date: 09/24/24  Number of days in the hospital: 4    SUBJECTIVE   Admitting Diagnosis: NSTEMI (non-ST elevated myocardial infarction) (HCC)  CC: chest pain     ICU transfer / step down     Pt examined at bedside. Chart & results reviewed.   Generally well, no active concerns or symptoms   Afebrile and vitals stable - saturating 100 % on 2 L - turned off and RN informed, BP slightly on higher side from 142 to 161 SBP     No further episodes of chest pains, breathing also much better. E&D fine. No N&V or abdo pain. Felt dizzy when moving to chair - no orthostatic drop   Input 540 and output 1450       Bloods:  Creat: 1.2 > 1.6 > 2.3 > 2.5 > 2.5   BUN:  22 > 24 > 28 > 30 > 36   Na: 134  Trop: 123 > 292 > 1592 > 1611 > 997 > 1305 > 1882 > 1725 > 2080 > 2116   BNP 47,176   Hb 7.7 > 8.3 > 7.8   Echo - 9/21/24 - Mildly reduced LVSF with EF 30-35 %, mild concentric LVH, mild to mod TR - RVSP 43 mmHg       ROS:  Constitutional:  negative for chills, fevers, sweats  Respiratory:  negative for cough, dyspnea on exertion, hemoptysis, wheezing  Cardiovascular:  negative for chest pain, chest pressure/discomfort, lower extremity edema, palpitations  Gastrointestinal:  negative for abdominal pain, constipation, diarrhea, nausea, vomiting  Neurological:  negative for dizziness, headache  BRIEF HISTORY   Rossana Alcantara is 89-year-old female whose history includes CAD s/p CABG in 2014, mitral regurgitation, LBBB, hypertension, hyperlipidemia, CHF, hypothyroidism and paroxysmal AF not on anticoagulation.  She woke up on Thursday morning, 9/19/2024, and was feeling fatigued  discharge once kidney function improves.     Horace Javde MD  Internal Medicine Resident, PGY-1  [facility]; [place], [place]  9/24/2024, 7:47 AM

## 2024-09-24 NOTE — PROGRESS NOTES
2,080* 2,116*     BNP: No results for input(s): \"BNP\" in the last 72 hours.  Lipids: No results for input(s): \"CHOL\", \"HDL\" in the last 72 hours.    Invalid input(s): \"LDLCALCU\"  INR:   No results for input(s): \"INR\" in the last 72 hours.      Objective:   Vitals: BP (!) 154/60   Pulse 73   Temp 97.7 °F (36.5 °C) (Axillary)   Resp 20   Ht 1.651 m (5' 5\")   Wt 74.4 kg (164 lb 0.4 oz)   SpO2 97%   BMI 27.29 kg/m²   General appearance: alert and cooperative with exam  HEENT: Head: Normocephalic, no lesions, without obvious abnormality.  Neck: no JVD, trachea midline, no adenopathy  Lungs: Clear to auscultation  Heart: Regular rate and rhythm, s1/s2 auscultated, no murmurs  Abdomen: soft, non-tender, bowel sounds active  Extremities: no edema  Neurologic: not done    EKG:  Normal sinus rhythm  Left axis deviation  Left bundle branch block  Abnormal ECG  When compared with ECG of 19-SEP-2024 23:03,  QRS axis Shifted left  T wave inversion no longer evident in Inferior leads     ECHO: 10/10/2023    Left Ventricle: Left ventricle appears normal in size. There is   moderate increased wall thickness/hypertrophy. Systolic function is mildly   to moderately decreased with an ejection fraction of 40-45%.     Left Atrium: Left atrium volume index is severely increased. The left   atrial volume index is 48.2 mL/m2.     Right Ventricle: Right ventricular size appears normal. The right   ventricular basal diameter is 24.0 mm. Systolic function is normal. Normal   tricuspid annular plane systolic excursion.     Aortic Valve: The aortic valve is trileaflet. The leaflets are mildly   calcified. There is mild regurgitation. There is no evidence of aortic   valve stenosis.     Mitral Valve: Mitral valve structure is normal. The leaflets are mildly   thickened. There is mild annular calcification. There is moderate   regurgitation. There is no evidence of mitral valve stenosis.     Tricuspid Valve: There is mild regurgitation. The  diffuse. Disease is noted to be 75%.      Left Anterior Descending   Size of vessel >=2.0 mm. There is severe disease. Disease is noted to be 100%.      Ramus Intermedius   There is severe disease. Disease is noted to be 100%.      Left Circumflex   There is severe disease. Disease is noted to be 100%.      Right Coronary Artery   The vessel is moderate in size. There is severe disease. The diseased area appears to be diffuse. The vessel is calcified. The vessel is tortuous.      LIMA Graft To Mid LAD   The graft was visualized by angiography and is moderate in size. The graft is angiographically normal.      Vein Graft To Ramus   Vein The graft was visualized by angiography and is moderate in size. The graft is angiographically normal.      Vein Graft To Mid Cx   Vein The graft was visualized by angiography and is moderate in size. The graft is angiographically normal.      Vein Graft To RPDA   Vein The flow in the graft is reversed. The vein graft to the RCA is occluded         Conclusion    Coronary angiogram showed severe three-vessel disease    The LIMA as well as the grafts to the diagonal and the OM are patent while the vein graft to the RCA is occluded    There is severe diffuse disease in the RCA might explain her symptoms    LVEF mildly reduced on LV gram    No aortic stenosis on catheter pullback    Considering advanced age and comorbidities I recommend medical management and pain control, if the pain is refractory to medical treatment then we will consider high risk PCI of the RCA which may require atherectomy    ECHO 9/21/24:    Left Ventricle: Mildly reduced left ventricular systolic function with a visually estimated EF of 30 - 35%. EF by 2D Simpsons Biplane is 30%. Left ventricle size is normal. Increased wall thickness. Findings consistent with mild concentric hypertrophy. Normal wall motion. Abnormal diastolic function.    Aortic Valve: Trileaflet valve. Mildly calcified noncoronary cusp.    Mitral

## 2024-09-24 NOTE — PROGRESS NOTES
Occupational Therapy  Facility/Department: [address] STEPDOWN   Daily Treatment Note  Patient Name: Rossana Alcantara        MRN: [id number]    : 1930    Date of Service: 2024    Discharge Recommendations  Discharge Recommendations: Further therapy recommended at discharge.The patient should be able to tolerate at least 3 hours of therapy per day over 5 days or 15 hours over 7 days.       Assessment  Performance deficits / Impairments: Decreased functional mobility ;Decreased endurance;Decreased ADL status;Decreased balance;Decreased high-level IADLs  Assessment: Pt previously independent prior to admission. Pt most limited by decreased activity and standing tolerance d/t fatigue. Pt required frequent seated rest breaks during standing acitivity. Pt limited by above deficits impacting pts functional mobility/ADL performance. Pt is expected to require skilled OT services to increase safety and promote increased independence t/o ADL/IADLs and functional mobility tasks.  Prognosis: Good  Activity Tolerance  Activity Tolerance: Patient Tolerated treatment well;Patient limited by fatigue  Safety Devices  Type of Devices: Gait belt;Call light within reach;Nurse notified;Patient at risk for falls;Bed alarm in place;Left in bed;Heels elevated for pressure relief  Restraints  Restraints Initially in Place: No    Restrictions/Precautions  Restrictions/Precautions  Restrictions/Precautions: Fall Risk;Up as Tolerated  Required Braces or Orthoses?: No  Position Activity Restriction  Other position/activity restrictions: Amb pt, up w/ A    Subjective  General  Patient assessed for rehabilitation services?: Yes  Response to previous treatment: Patient with no complaints from previous session  Family / Caregiver Present: No  Diagnosis: NSTEMI, chest pain, hyponatremia, fatigue, hx of CABG/A-fib  General Comment  Comments: RN ok'd pt for OT this date. Pt agreeable, pleasant and cooperative t/o. Pt denied pain this

## 2024-09-25 LAB
ANION GAP SERPL CALCULATED.3IONS-SCNC: 14 MMOL/L (ref 9–16)
ANTI-XA UNFRAC HEPARIN: <0.1 IU/L
BASOPHILS # BLD: 0 K/UL (ref 0–0.2)
BASOPHILS NFR BLD: 0 % (ref 0–2)
BUN SERPL-MCNC: 40 MG/DL (ref 8–23)
CALCIUM SERPL-MCNC: 9.2 MG/DL (ref 8.6–10.4)
CHLORIDE SERPL-SCNC: 94 MMOL/L (ref 98–107)
CO2 SERPL-SCNC: 26 MMOL/L (ref 20–31)
CREAT SERPL-MCNC: 2.4 MG/DL (ref 0.5–0.9)
EOSINOPHIL # BLD: 0.06 K/UL (ref 0–0.44)
EOSINOPHILS RELATIVE PERCENT: 1 % (ref 1–4)
ERYTHROCYTE [DISTWIDTH] IN BLOOD BY AUTOMATED COUNT: 15 % (ref 11.8–14.4)
GFR, ESTIMATED: 19 ML/MIN/1.73M2
GLUCOSE BLD-MCNC: 143 MG/DL (ref 65–105)
GLUCOSE BLD-MCNC: 167 MG/DL (ref 65–105)
GLUCOSE BLD-MCNC: 198 MG/DL (ref 65–105)
GLUCOSE BLD-MCNC: 228 MG/DL (ref 65–105)
GLUCOSE SERPL-MCNC: 262 MG/DL (ref 74–99)
HCT VFR BLD AUTO: 27.6 % (ref 36.3–47.1)
HGB BLD-MCNC: 9.1 G/DL (ref 11.9–15.1)
IMM GRANULOCYTES # BLD AUTO: 0 K/UL (ref 0–0.3)
IMM GRANULOCYTES NFR BLD: 0 %
LYMPHOCYTES NFR BLD: 1.71 K/UL (ref 1.1–3.7)
LYMPHOCYTES RELATIVE PERCENT: 29 % (ref 24–43)
MAGNESIUM SERPL-MCNC: 1.7 MG/DL (ref 1.7–2.3)
MCH RBC QN AUTO: 39.2 PG (ref 25.2–33.5)
MCHC RBC AUTO-ENTMCNC: 33 G/DL (ref 28.4–34.8)
MCV RBC AUTO: 119 FL (ref 82.6–102.9)
MONOCYTES NFR BLD: 0.3 K/UL (ref 0.1–1.2)
MONOCYTES NFR BLD: 5 % (ref 3–12)
MORPHOLOGY: ABNORMAL
MORPHOLOGY: ABNORMAL
NEUTROPHILS NFR BLD: 65 % (ref 36–65)
NEUTS SEG NFR BLD: 3.83 K/UL (ref 1.5–8.1)
NRBC BLD-RTO: 0.5 PER 100 WBC
PLATELET # BLD AUTO: 184 K/UL (ref 138–453)
PMV BLD AUTO: 11.7 FL (ref 8.1–13.5)
POTASSIUM SERPL-SCNC: 4.4 MMOL/L (ref 3.7–5.3)
RBC # BLD AUTO: 2.32 M/UL (ref 3.95–5.11)
SODIUM SERPL-SCNC: 134 MMOL/L (ref 136–145)
WBC OTHER # BLD: 5.9 K/UL (ref 3.5–11.3)

## 2024-09-25 PROCEDURE — 82947 ASSAY GLUCOSE BLOOD QUANT: CPT

## 2024-09-25 PROCEDURE — 99232 SBSQ HOSP IP/OBS MODERATE 35: CPT | Performed by: STUDENT IN AN ORGANIZED HEALTH CARE EDUCATION/TRAINING PROGRAM

## 2024-09-25 PROCEDURE — 6360000002 HC RX W HCPCS: Performed by: INTERNAL MEDICINE

## 2024-09-25 PROCEDURE — 6370000000 HC RX 637 (ALT 250 FOR IP)

## 2024-09-25 PROCEDURE — 36415 COLL VENOUS BLD VENIPUNCTURE: CPT

## 2024-09-25 PROCEDURE — 2580000003 HC RX 258: Performed by: STUDENT IN AN ORGANIZED HEALTH CARE EDUCATION/TRAINING PROGRAM

## 2024-09-25 PROCEDURE — 99232 SBSQ HOSP IP/OBS MODERATE 35: CPT | Performed by: PHYSICAL MEDICINE & REHABILITATION

## 2024-09-25 PROCEDURE — 99232 SBSQ HOSP IP/OBS MODERATE 35: CPT | Performed by: INTERNAL MEDICINE

## 2024-09-25 PROCEDURE — 2580000003 HC RX 258

## 2024-09-25 PROCEDURE — 6360000002 HC RX W HCPCS

## 2024-09-25 PROCEDURE — 6370000000 HC RX 637 (ALT 250 FOR IP): Performed by: STUDENT IN AN ORGANIZED HEALTH CARE EDUCATION/TRAINING PROGRAM

## 2024-09-25 PROCEDURE — 6370000000 HC RX 637 (ALT 250 FOR IP): Performed by: NURSE PRACTITIONER

## 2024-09-25 PROCEDURE — 97110 THERAPEUTIC EXERCISES: CPT

## 2024-09-25 PROCEDURE — 85025 COMPLETE CBC W/AUTO DIFF WBC: CPT

## 2024-09-25 PROCEDURE — 99233 SBSQ HOSP IP/OBS HIGH 50: CPT | Performed by: NURSE PRACTITIONER

## 2024-09-25 PROCEDURE — 97116 GAIT TRAINING THERAPY: CPT

## 2024-09-25 PROCEDURE — 2060000000 HC ICU INTERMEDIATE R&B

## 2024-09-25 PROCEDURE — 85520 HEPARIN ASSAY: CPT

## 2024-09-25 PROCEDURE — 80048 BASIC METABOLIC PNL TOTAL CA: CPT

## 2024-09-25 PROCEDURE — 83735 ASSAY OF MAGNESIUM: CPT

## 2024-09-25 RX ORDER — METOPROLOL TARTRATE 50 MG
50 TABLET ORAL 2 TIMES DAILY
Status: CANCELLED | DISCHARGE
Start: 2024-09-25

## 2024-09-25 RX ORDER — HEPARIN SODIUM 5000 [USP'U]/ML
5000 INJECTION, SOLUTION INTRAVENOUS; SUBCUTANEOUS EVERY 8 HOURS SCHEDULED
Status: DISCONTINUED | OUTPATIENT
Start: 2024-09-25 | End: 2024-09-27 | Stop reason: HOSPADM

## 2024-09-25 RX ORDER — AMLODIPINE BESYLATE 2.5 MG/1
2.5 TABLET ORAL NIGHTLY
Status: CANCELLED | DISCHARGE
Start: 2024-09-25

## 2024-09-25 RX ORDER — POLYETHYLENE GLYCOL 3350 17 G/17G
17 POWDER, FOR SOLUTION ORAL DAILY PRN
Status: CANCELLED | DISCHARGE
Start: 2024-09-25 | End: 2024-10-25

## 2024-09-25 RX ORDER — ATORVASTATIN CALCIUM 40 MG/1
40 TABLET, FILM COATED ORAL DAILY
Qty: 30 TABLET | Refills: 2 | Status: CANCELLED | OUTPATIENT
Start: 2024-09-25 | End: 2024-12-24

## 2024-09-25 RX ADMIN — HEPARIN SODIUM 5000 UNITS: 5000 INJECTION INTRAVENOUS; SUBCUTANEOUS at 21:21

## 2024-09-25 RX ADMIN — CLOPIDOGREL BISULFATE 75 MG: 75 TABLET ORAL at 09:44

## 2024-09-25 RX ADMIN — ROPINIROLE HYDROCHLORIDE 0.25 MG: 0.25 TABLET, FILM COATED ORAL at 09:43

## 2024-09-25 RX ADMIN — SODIUM CHLORIDE, PRESERVATIVE FREE 10 ML: 5 INJECTION INTRAVENOUS at 09:45

## 2024-09-25 RX ADMIN — AMLODIPINE BESYLATE 2.5 MG: 5 TABLET ORAL at 21:21

## 2024-09-25 RX ADMIN — ROPINIROLE HYDROCHLORIDE 0.25 MG: 0.25 TABLET, FILM COATED ORAL at 21:21

## 2024-09-25 RX ADMIN — LEVOTHYROXINE SODIUM 125 MCG: 75 TABLET ORAL at 06:06

## 2024-09-25 RX ADMIN — SODIUM CHLORIDE, PRESERVATIVE FREE 10 ML: 5 INJECTION INTRAVENOUS at 09:44

## 2024-09-25 RX ADMIN — INSULIN LISPRO 2 UNITS: 100 INJECTION, SOLUTION INTRAVENOUS; SUBCUTANEOUS at 21:28

## 2024-09-25 RX ADMIN — SODIUM CHLORIDE, PRESERVATIVE FREE 10 ML: 5 INJECTION INTRAVENOUS at 21:21

## 2024-09-25 RX ADMIN — METOPROLOL TARTRATE 50 MG: 50 TABLET, FILM COATED ORAL at 09:43

## 2024-09-25 RX ADMIN — ASPIRIN 81 MG 81 MG: 81 TABLET ORAL at 09:44

## 2024-09-25 RX ADMIN — ISOSORBIDE MONONITRATE 60 MG: 60 TABLET, EXTENDED RELEASE ORAL at 09:44

## 2024-09-25 RX ADMIN — ATORVASTATIN CALCIUM 40 MG: 40 TABLET, FILM COATED ORAL at 21:22

## 2024-09-25 RX ADMIN — FUROSEMIDE 80 MG: 10 INJECTION, SOLUTION INTRAMUSCULAR; INTRAVENOUS at 09:45

## 2024-09-25 RX ADMIN — METOPROLOL TARTRATE 50 MG: 50 TABLET, FILM COATED ORAL at 21:22

## 2024-09-25 RX ADMIN — SODIUM CHLORIDE, PRESERVATIVE FREE 10 ML: 5 INJECTION INTRAVENOUS at 21:22

## 2024-09-25 RX ADMIN — ISOSORBIDE MONONITRATE 60 MG: 60 TABLET, EXTENDED RELEASE ORAL at 21:21

## 2024-09-25 RX ADMIN — Medication 5 MG: at 21:22

## 2024-09-25 RX ADMIN — FAMOTIDINE 20 MG: 20 TABLET, FILM COATED ORAL at 09:44

## 2024-09-25 NOTE — CARE COORDINATION
ACUTE INPATIENT REHABILITATION  Financial Disclosure Statement: Eligibility and Benefit Verification    Patient Name: Rossana Alcantara MRN: [id number]     Disclosure Statement  [facility] Acute Inpatient Rehabilitation at [facility] provides 24 hour individualized service to patients with functional limitations due to, but not limited to stroke, brain injury, spinal cord injury, major multiple trauma, hip fracture, amputation, and neurological disorders.  Acute Inpatient Rehabilitation provides rehabilitative nursing, physician coverage, as well as physical therapy, occupational therapy, speech therapy, recreational therapy and other services as deemed necessary by our Board Certified Physical Medicine and Rehabilitation Physicians, Dr. Gustavo Burrell and Dr. Sonam Gatica and Dr. Shannan Coe.    [facility] Acute Inpatient Rehabilitation at [facility] is fully accredited by the Commission on Accreditation of Rehabilitation Facilities (CARF) and The Joint Commission (TJC).    At a minimum, you will receive 5 days of therapy services totaling at least 15 hours per week. Your treatment program will consist of physical therapy at least 7.5 hours per week; occupational therapy 7.5 hours per week; and speech therapy 1.5 hours per week, as applicable.    Your estimated length of stay is currently:  Approximately 2 Weeks  Please Note: Estimated length of stay is based on individual condition and Acute Inpatient Rehabilitation specific needs. Length of stay may vary based upon interdisciplinary team assessment, insurance approval, and patient progression.    Your insurance coverage has been verified as follows:   Date Verified: 09/25/2024   Method:  Phone Call: Call Ref# [id number], Representative: Ludy BARROS     Primary Insurance: [facility] Medicare  Member/Subscriber ID: [id number]   Coverage: Per Benefit Period  Plan Effective Date: 01/01/2021 Status: Active  Deductible: $300 (Amount Currently Met:

## 2024-09-25 NOTE — DISCHARGE INSTRUCTIONS
You came to the hospital due to chest pain and shortness of breath and you were admitted to the ICU and seen by cardiology.  He underwent left heart catheterization which revealed patent grafts except for 1 graft SVG-RCA which was occluded and revealed severe diffuse disease in the right coronary artery which might be causing your symptoms of chest pain.  After prolonged discussion and review by cardiology team they recommended medical management with dual antiplatelet therapy with aspirin and Plavix, Lopressor, Imdur.  Echo of your heart was done which showed decreased ejection fraction 30% for which we recommended to continue to wear LifeVest for irregular/lethal rhythm prophylaxis.  She also developed acute kidney injury on top of your chronic kidney disease for which you are seen by nephrology team and we started you on water pills.  Please take Demadex (torsemide) 40 mg daily  Please continue to take medications as prescribed.  Please follow-up with your primary care provider in 1 week for posthospitalization follow-up and continuity of care.  Please follow-up with cardiology in 1-2 weeks.  Please follow-up with nephrology in 1-2 weeks with repeat BMP in 1 week  Please come back to the hospital if your symptoms worsens or you start noticing any new concerning symptoms like chest pain, shortness of breath, nausea, vomiting, abdominal pain, blood in stool, blood in urine or any other concerning symptoms.

## 2024-09-25 NOTE — PROGRESS NOTES
[facility]   Progress Note                   Date:   9/25/2024  Patient name: Rossana Alcantara  Date of admission:  9/19/2024 10:57 PM  MRN:   [id number]  YOB: 1930  PCP: Modesto Ayala MD    Reason for Admission: NSTEMI (non-ST elevated myocardial infarction) (Former[facility]) [I21.4]  Troponin level elevated [R79.89]  Acute on chronic congestive heart failure, unspecified heart failure type (Formerly [facility]) [I50.9]    Subjective:       Clinical Changes / Abnormalities: Pt seen and examined in the room.  Patient resting in bed. Pt denies any CP or sob, lifevest on.  Labs, vitals and tele reviewed- SR       Medications:   Scheduled Meds:   amLODIPine  2.5 mg Oral Nightly    furosemide  80 mg IntraVENous Daily    metoprolol tartrate  50 mg Oral BID    isosorbide mononitrate  60 mg Oral BID    famotidine  20 mg Oral Daily    rOPINIRole  0.25 mg Oral TID    insulin lispro  0-8 Units SubCUTAneous 4x Daily AC & HS    levothyroxine  125 mcg Oral Daily    sodium chloride flush  5-40 mL IntraVENous 2 times per day    melatonin  5 mg Oral Nightly    sodium chloride flush  5-40 mL IntraVENous 2 times per day    atorvastatin  40 mg Oral Daily    clopidogrel  75 mg Oral Daily    aspirin  81 mg Oral Daily     Continuous Infusions:   sodium chloride      dextrose      sodium chloride       CBC:   Recent Labs     09/23/24  1023 09/24/24  0236 09/25/24  1015   WBC 5.3 5.2 5.9   HGB 8.3* 7.8* 9.1*    154 184     BMP:    Recent Labs     09/23/24  1023 09/24/24  0236   * 134*   K 4.6 4.6   CL 99 98   CO2 21 22   BUN 30* 36*   CREATININE 2.5* 2.5*   GLUCOSE 158* 120*     Hepatic:   No results for input(s): \"AST\", \"ALT\", \"BILITOT\", \"ALKPHOS\" in the last 72 hours.    Invalid input(s): \"ALB\"    Troponin:   No results for input(s): \"TROPHS\" in the last 72 hours.    BNP: No results for input(s): \"BNP\" in the last 72 hours.  Lipids: No results for input(s): \"CHOL\", \"HDL\" in the last 72 hours.    Invalid input(s):  \"LDLCALCU\"  INR:   No results for input(s): \"INR\" in the last 72 hours.      Objective:   Vitals: BP (!) 124/59   Pulse 69   Temp 97.4 °F (36.3 °C) (Temporal)   Resp 29   Ht 1.651 m (5' 5\")   Wt 74.4 kg (164 lb 0.4 oz)   SpO2 96%   BMI 27.29 kg/m²   General appearance: alert and cooperative with exam  HEENT: Head: Normocephalic, no lesions, without obvious abnormality.  Neck: no JVD, trachea midline, no adenopathy  Lungs: Clear to auscultation  Heart: Regular rate and rhythm, s1/s2 auscultated, no murmurs  Abdomen: soft, non-tender, bowel sounds active  Extremities: no edema  Neurologic: not done    EKG:  Normal sinus rhythm  Left axis deviation  Left bundle branch block  Abnormal ECG  When compared with ECG of 19-SEP-2024 23:03,  QRS axis Shifted left  T wave inversion no longer evident in Inferior leads     ECHO: 10/10/2023    Left Ventricle: Left ventricle appears normal in size. There is   moderate increased wall thickness/hypertrophy. Systolic function is mildly   to moderately decreased with an ejection fraction of 40-45%.     Left Atrium: Left atrium volume index is severely increased. The left   atrial volume index is 48.2 mL/m2.     Right Ventricle: Right ventricular size appears normal. The right   ventricular basal diameter is 24.0 mm. Systolic function is normal. Normal   tricuspid annular plane systolic excursion.     Aortic Valve: The aortic valve is trileaflet. The leaflets are mildly   calcified. There is mild regurgitation. There is no evidence of aortic   valve stenosis.     Mitral Valve: Mitral valve structure is normal. The leaflets are mildly   thickened. There is mild annular calcification. There is moderate   regurgitation. There is no evidence of mitral valve stenosis.     Tricuspid Valve: There is mild regurgitation. The right ventricular   systolic pressure is mildly elevated. RVSP calculated at 41 mmHg. RVSP is   based on RA pressure of 3 mmHg. There is mild pulmonary hypertension.

## 2024-09-25 NOTE — CARE COORDINATION
Met with patient and family to discuss transitional planning. Plan is  [facility]. Patient has been accepted and will need prior auth. Prior auth request submit via [employer] per  assistant serene wynne and is pending. Patient did receive life vest and has been instructed on use. Patient agreeable to plan.

## 2024-09-25 NOTE — CONSULTS
Physical Medicine & Rehabilitation  Consult Note      Admitting Physician: Alee Gallegos MD    Primary Care Provider: Modesto Ayala MD     Reason for Consult:  Acute Inpatient Rehabilitation    Chief Complaint: Chest pain, fatigue    History of Present Illness:  Referring Provider is requesting an evaluation for appropriate placement upon discharge from acute care.     Ms. Rossana Alcantara is a 89 y.o.  female who was admitted to [facility] on 9/19/2024 with Chest Pain and Shortness of Breath    89-year-old female with history coronary disease status post CABG 2014, mitral regurgitation, left bundle branch block, hypertension, hyperlipidemia, CHF ejection fraction 40 to 45%, hypothyroidism, proximal A-fib not on anticoagulation presenting with chest pain and fatigue.  She was found to be tachypneic diaphoretic EKG showed left bundle branch block old concern for scarboss criteria patient also started on Zosyn due to meeting SIRS criteria    Cardiology NSTEMI status post cardiac cath 9/20/2024 occluded SVG to RCA severe disease RCA/PSI deferred due to advanced age and multiple comorbidities acute hypoxic respite failure due to acute on chronic heart failure with reduced ejection fraction, paroxysmal A-fib not on anticoagulation ischemic cardiomyopathy as well as hypertension hyperlipidemia and type 2 diabetes-continue aspirin Plavix statin Imdur and beta-blocker ejection fraction 3035% ordered LifeVest on discharge, GDMT, medical manage    Internal medicine as above troponins uptrending no more chest pain high BNP low ejection fraction pulmonary edema and rising creatinine likely explains rising troponin, acute on chronic CHF with ischemic cardiomyopathy cardiology starting low-dose IV Lasix nephro advised reduced Lasix 80 mg IV every other day, AMADO/CKD contrast versus cardiorenal ultrasound hypoechoic bilateral renal cortex suggestive of underlying parenchymal disease renal follow-up  -Creatinine  three times a week     Frequency of Social Gatherings with Friends and Family: More than three times a week     Attends [employer] Services: More than 4 times per year     Active Member of Clubs or Organizations: Yes     Attends Club or Organization Meetings: More than 4 times per year     Marital Status:    Intimate Partner Violence: Unknown (2/22/2024)    Received from [facility]    [place] Safety & Environment     Fear of Current or Ex-Partner: Not on file     Emotionally Abused: Not on file     Physically Abused: Not on file     Sexually Abused: Not on file     Physically or Sexually Abused: Not on file   Housing Stability: Low Risk  (9/20/2024)    Housing Stability Vital Sign     Unable to Pay for Housing in the Last Year: No     Number of Times Moved in the Last Year: 1     Homeless in the Last Year: No     Social/Functional History  Lives With: Alone  Type of Home: House  Home Layout: One level  Home Access: Stairs to enter with rails  Entrance Stairs - Number of Steps: 2  Entrance Stairs - Rails: Both  Bathroom Shower/Tub: Tub/Shower unit  Bathroom Toilet: Standard  Bathroom Equipment: Shower chair  Home Equipment: Walker - Rolling (no DME use at a baseline.)  Has the patient had two or more falls in the past year or any fall with injury in the past year?: No  ADL Assistance: Independent  Homemaking Assistance: Independent  Homemaking Responsibilities: Yes  Ambulation Assistance: Independent  Transfer Assistance: Independent  Active : Yes  Mode of Transportation: Car  Occupation: Retired  Type of Occupation: raising her kids  Leisure & Hobbies: go out with friends, play cards, bake/cook  Additional Comments: Pt reports her son and dtr live locally and would be able to provide some assistance upon discharge.    Family History:       Problem Relation Age of Onset    Coronary Art Dis Mother     Coronary Art Dis Father            Physical Exam:    /62   Pulse 69   Temp 97.4 °F (36.3

## 2024-09-25 NOTE — PROGRESS NOTES
Renal Progress Note    Patient :  Rossana Alcantara; 89 y.o. MRN# [id number]  Location:  0421/0421-02  Attending:  Alee Gallegos MD  Admit Date:  9/19/2024   Hospital Day: 5      Subjective:     Patient seen and examined bedside   Denied any complaints, blood pressure well controlled, she is on room air   Denies any shortness of breath ,denies any chest pain   Am labs reviewed , BMP with sodium 134, potassium 4.4, chloride 94, bicarb 26, BUN 40, creatinine 2.4, anion gap 14,  - Hemoglobin 9.1, WBC 5.9, platelets 184  -Urine output has been around 2.8 L in the past 24 hours, she is on lasix 80 mg IV daily,     History reviewed  Known history of coronary artery disease history of coronary bypass graft, chronic kidney disease baseline 1.1-1.2, hypertension, hypothyroidism, cardiomyopathy ejection fraction 30%.  Came into the hospital with complaints of chest pain and shortness of breath.  Troponins were significantly elevated.  She received contrast on admission for CT PE and CT abdomen.  Was negative for PE.  She is underwent cardiac cath which showed occluded RCA graft with significant distal disease.  Rest grafts were patent.  Creatinine from baseline of 1.2 went up to 2.3.  Nephrology consulted for acute kidney injury.      Outpatient Medications:     Medications Prior to Admission: allopurinol (ZYLOPRIM) 300 MG tablet, Take 1 tablet by mouth daily  atorvastatin (LIPITOR) 10 MG tablet, Take 4 tablets by mouth daily  estradiol (ESTRACE) 0.5 MG tablet, Take 1 tablet by mouth daily  isosorbide mononitrate (IMDUR) 60 MG extended release tablet, Take 1 tablet by mouth daily  levothyroxine (SYNTHROID) 125 MCG tablet, Take 1 tablet by mouth Daily  isosorbide mononitrate (IMDUR) 60 MG extended release tablet, Take 1 tablet by mouth daily  nitroGLYCERIN (NITROSTAT) 0.4 MG SL tablet, Place 1 tablet under the tongue every 5 minutes as needed  aspirin 81 MG tablet, Take 1 tablet by mouth daily  Coenzyme Q-10 100 MG    CL 99 98 94*   CO2 21 22 26   BUN 30* 36* 40*   CREATININE 2.5* 2.5* 2.4*   GLUCOSE 158* 120* 262*   CALCIUM 8.6 8.9 9.2      Magnesium:    Recent Labs     09/23/24  1023 09/24/24  0236 09/25/24  1015   MG 2.0 2.0 1.7     ADRIANA:      Lab Results   Component Value Date/Time    ADRIANA NEGATIVE 09/22/2024 03:04 PM     SPEP:  Lab Results   Component Value Date/Time    ALBCAL 2.7 09/22/2024 03:04 PM    ALBPCT 44 09/22/2024 03:04 PM    A1PCT 6 09/22/2024 03:04 PM    A2PCT 17 09/22/2024 03:04 PM    BETAPCT 12 09/22/2024 03:04 PM    GAMGLOB 1.3 09/22/2024 03:04 PM    GGPCT 21 09/22/2024 03:04 PM    PATH ELECTRONICALLY SIGNED. CHRIST CORTES M.D. 09/22/2024 03:04 PM     UPEP:     Lab Results   Component Value Date/Time    LABPE  09/22/2024 06:52 AM     Free monoclonal light chains are present, identified as Lambda (108.6 mg/dl). Elevated protein concentration. Most serum proteins are detected in this urine.  Usually observed with markedly increased nonselective glomerular permaeabilty (severe glomerular disease) and/or contamination of urine with blood. A decrease in tubular function cannot be ruled out.               C3:     Lab Results   Component Value Date/Time    C3 157 09/22/2024 03:04 PM     C4:     Lab Results   Component Value Date/Time    C4 28 09/22/2024 03:04 PM     Hep BsAg:         Lab Results   Component Value Date/Time    HEPBSAG NONREACTIVE 09/22/2024 03:04 PM     Hep C AB:          Lab Results   Component Value Date/Time    HEPCAB NONREACTIVE 09/22/2024 03:04 PM       Urinalysis/Chemistries:      Lab Results   Component Value Date/Time    NITRU NEGATIVE 09/22/2024 06:52 AM    COLORU Yellow 09/22/2024 06:52 AM    PHUR 5.5 09/22/2024 06:52 AM    PHUR 5.5 05/08/2014 11:02 AM    WBCUA 20 TO 50 09/22/2024 06:52 AM    RBCUA 0 TO 2 09/22/2024 06:52 AM    MUCUS 1+ 05/08/2014 11:02 AM    TRICHOMONAS NOT REPORTED 05/08/2014 11:02 AM    YEAST NOT REPORTED 05/08/2014 11:02 AM    BACTERIA None 09/22/2024 06:52 AM

## 2024-09-25 NOTE — CARE COORDINATION
Per Dr. Burrell patient would be appropriate for acute inpatient rehabilitation level of care once medically stable.     Would need:  LifeVest placed prior to admission  IV Lasix transitioned to oral  Nephrology following for increased creatinine would need cleared as medically stable    VM left for CM at [phone] to see if patient is medically stable for prior authorization initiation. Requested callback.

## 2024-09-25 NOTE — PROGRESS NOTES
Physical Therapy  Facility/Department: [address] STEPDOWN  Physical Therapy Treatment Note    Name: Rossana Alcantara  : 1930  MRN: [id number]  Date of Service: 2024    Discharge Recommendations:  Patient would benefit from continued therapy after discharge   PT Equipment Recommendations  Equipment Needed: No      Patient Diagnosis(es): The primary encounter diagnosis was Acute on chronic congestive heart failure, unspecified heart failure type (Former[facility]). Diagnoses of Troponin level elevated and NSTEMI (non-ST elevated myocardial infarction) (Formerly [facility]) were also pertinent to this visit.  Past Medical History:  has a past medical history of Accelerating angina ([facility]ly [facility]), Arthritis, CAD (coronary artery disease), Carotid bruit, DM (diabetes mellitus) ([facility]), HTN (hypertension), Hypothyroid, Mitral regurgitation, Paroxysmal A-fib ([facility]), and Pericarditis.  Past Surgical History:  has a past surgical history that includes Cardiac catheterization; Cholecystectomy; Hysterectomy; shoulder surgery; Tonsillectomy; Breast lumpectomy; and Coronary artery bypass graft (2014).    Assessment  Body Structures, Functions, Activity Limitations Requiring Skilled Therapeutic Intervention: Decreased functional mobility ;Decreased endurance;Decreased balance;Decreased strength;Decreased coordination  Assessment: Pt with mobility deficits requiring CGA to ambulate 40ft plus 20 ft with a RW.  Prior to this admission, pt was able to ambulate in the community with no AD.   Pt would benefit from additional PT upon discharge to assist in return to independent PLOF.  Pt will require 24 hour assistance with mobility upon discharge.  Therapy Prognosis: Good  Requires PT Follow-Up: Yes  Activity Tolerance  Activity Tolerance: Patient tolerated treatment well;Patient limited by endurance    Plan  Physical Therapy Plan  General Plan:  (5-6x/wk)  Current Treatment Recommendations: Strengthening, ROM, Balance training, Functional mobility training,

## 2024-09-25 NOTE — PROGRESS NOTES
[facility]  Internal Medicine Teaching Residency Program  Inpatient Daily Progress Note  ______________________________________________________________________________    Patient: Rossana Alcantara  YOB: 1930   MRN:[id number]    Acct: [id number]     Room: [facility]042-02  Admit date: 9/19/2024  Today's date: 09/25/24  Number of days in the hospital: 5    SUBJECTIVE   Admitting Diagnosis: NSTEMI (non-ST elevated myocardial infarction) ([facility])  CC: chest pain     ICU transfer / step down     Pt examined at bedside. Chart & results reviewed.   Generally well, no active concerns or symptoms   Afebrile and vitals stable - saturating 95 % on room air     No further episodes of chest pains, breathing also much better. E&D fine. No N&V or abdo pain. No dizziness reported today.     Lifevest placed today - patient does not like it, informs that she is 89 year old and happy to go when her time comes. Understands the risks of not wearing it.     Input 2350 and output 2840     Bloods - pending today:   Creat: 1.2 > 1.6 > 2.3 > 2.5 > 2.5   BUN:  22 > 24 > 28 > 30 > 36   Na: 134  Trop: 123 > 292 > 1592 > 1611 > 997 > 1305 > 1882 > 1725 > 2080 > 2116   BNP 47,176   Hb 7.7 > 8.3 > 7.8   Echo - 9/21/24 - Mildly reduced LVSF with EF 30-35 %, mild concentric LVH, mild to mod TR - RVSP 43 mmHg       ROS:  Constitutional:  negative for chills, fevers, sweats  Respiratory:  negative for cough, dyspnea on exertion, hemoptysis, wheezing  Cardiovascular:  negative for chest pain, chest pressure/discomfort, lower extremity edema, palpitations  Gastrointestinal:  negative for abdominal pain, constipation, diarrhea, nausea, vomiting  Neurological:  negative for dizziness, headache  BRIEF HISTORY   Rossana Alcantara is 89-year-old female whose history includes CAD s/p CABG in 2014, mitral regurgitation, LBBB, hypertension, hyperlipidemia, CHF, hypothyroidism and paroxysmal AF not on

## 2024-09-25 NOTE — CARE COORDINATION
Pre-cert initiated in [facility] for [facility]. Auth ID Number is [id number] and Auth is Pending. Updated CM.

## 2024-09-25 NOTE — PLAN OF CARE
Problem: Discharge Planning  Goal: Discharge to home or other facility with appropriate resources  9/24/2024 2152 by Juany Truong RN  Outcome: Progressing  9/24/2024 1430 by Alfredo Prieto RN  Outcome: Progressing     Problem: Pain  Goal: Verbalizes/displays adequate comfort level or baseline comfort level  9/24/2024 2152 by Juany Truong RN  Outcome: Progressing  9/24/2024 1430 by Alfredo Prieto RN  Outcome: Progressing     Problem: Skin/Tissue Integrity  Goal: Absence of new skin breakdown  Description: 1.  Monitor for areas of redness and/or skin breakdown  2.  Assess vascular access sites hourly  3.  Every 4-6 hours minimum:  Change oxygen saturation probe site  4.  Every 4-6 hours:  If on nasal continuous positive airway pressure, respiratory therapy assess nares and determine need for appliance change or resting period.  9/24/2024 2152 by Juany Truong RN  Outcome: Progressing  9/24/2024 1430 by Alfredo Prieto RN  Outcome: Progressing     Problem: Safety - Adult  Goal: Free from fall injury  9/24/2024 2152 by Juany Truong RN  Outcome: Progressing  9/24/2024 1430 by Alfredo Prieto RN  Outcome: Progressing     Problem: ABCDS Injury Assessment  Goal: Absence of physical injury  9/24/2024 2152 by Juany Truong RN  Outcome: Progressing  9/24/2024 1430 by Alfredo Prieto RN  Outcome: Progressing     Problem: Chronic Conditions and Co-morbidities  Goal: Patient's chronic conditions and co-morbidity symptoms are monitored and maintained or improved  9/24/2024 2152 by Juany Truong RN  Outcome: Progressing  9/24/2024 1430 by Alfredo Prieto RN  Outcome: Progressing

## 2024-09-26 LAB
ANION GAP SERPL CALCULATED.3IONS-SCNC: 14 MMOL/L (ref 9–16)
BASOPHILS # BLD: 0 K/UL (ref 0–0.2)
BASOPHILS NFR BLD: 0 % (ref 0–2)
BUN SERPL-MCNC: 45 MG/DL (ref 8–23)
CALCIUM SERPL-MCNC: 8.8 MG/DL (ref 8.6–10.4)
CHLORIDE SERPL-SCNC: 95 MMOL/L (ref 98–107)
CO2 SERPL-SCNC: 26 MMOL/L (ref 20–31)
CREAT SERPL-MCNC: 2.2 MG/DL (ref 0.5–0.9)
EOSINOPHIL # BLD: 0.09 K/UL (ref 0–0.44)
EOSINOPHILS RELATIVE PERCENT: 2 % (ref 1–4)
ERYTHROCYTE [DISTWIDTH] IN BLOOD BY AUTOMATED COUNT: 14.6 % (ref 11.8–14.4)
GFR, ESTIMATED: 20 ML/MIN/1.73M2
GLUCOSE BLD-MCNC: 197 MG/DL (ref 65–105)
GLUCOSE BLD-MCNC: 238 MG/DL (ref 65–105)
GLUCOSE BLD-MCNC: 245 MG/DL (ref 65–105)
GLUCOSE SERPL-MCNC: 137 MG/DL (ref 74–99)
HCT VFR BLD AUTO: 26.9 % (ref 36.3–47.1)
HGB BLD-MCNC: 8.8 G/DL (ref 11.9–15.1)
IMM GRANULOCYTES # BLD AUTO: 0 K/UL (ref 0–0.3)
IMM GRANULOCYTES NFR BLD: 0 %
LYMPHOCYTES NFR BLD: 1.7 K/UL (ref 1.1–3.7)
LYMPHOCYTES RELATIVE PERCENT: 37 % (ref 24–43)
MAGNESIUM SERPL-MCNC: 1.7 MG/DL (ref 1.7–2.3)
MCH RBC QN AUTO: 39.3 PG (ref 25.2–33.5)
MCHC RBC AUTO-ENTMCNC: 32.7 G/DL (ref 28.4–34.8)
MCV RBC AUTO: 120.1 FL (ref 82.6–102.9)
MONOCYTES NFR BLD: 0.28 K/UL (ref 0.1–1.2)
MONOCYTES NFR BLD: 6 % (ref 3–12)
MORPHOLOGY: ABNORMAL
NEUTROPHILS NFR BLD: 55 % (ref 36–65)
NEUTS SEG NFR BLD: 2.53 K/UL (ref 1.5–8.1)
NRBC BLD-RTO: 0.4 PER 100 WBC
PLATELET # BLD AUTO: 158 K/UL (ref 138–453)
PMV BLD AUTO: 11 FL (ref 8.1–13.5)
POTASSIUM SERPL-SCNC: 4.1 MMOL/L (ref 3.7–5.3)
RBC # BLD AUTO: 2.24 M/UL (ref 3.95–5.11)
SODIUM SERPL-SCNC: 135 MMOL/L (ref 136–145)
WBC OTHER # BLD: 4.6 K/UL (ref 3.5–11.3)

## 2024-09-26 PROCEDURE — 99232 SBSQ HOSP IP/OBS MODERATE 35: CPT | Performed by: PHYSICAL MEDICINE & REHABILITATION

## 2024-09-26 PROCEDURE — 97530 THERAPEUTIC ACTIVITIES: CPT

## 2024-09-26 PROCEDURE — 6370000000 HC RX 637 (ALT 250 FOR IP)

## 2024-09-26 PROCEDURE — 85025 COMPLETE CBC W/AUTO DIFF WBC: CPT

## 2024-09-26 PROCEDURE — 2580000003 HC RX 258: Performed by: STUDENT IN AN ORGANIZED HEALTH CARE EDUCATION/TRAINING PROGRAM

## 2024-09-26 PROCEDURE — 6370000000 HC RX 637 (ALT 250 FOR IP): Performed by: STUDENT IN AN ORGANIZED HEALTH CARE EDUCATION/TRAINING PROGRAM

## 2024-09-26 PROCEDURE — 6370000000 HC RX 637 (ALT 250 FOR IP): Performed by: NURSE PRACTITIONER

## 2024-09-26 PROCEDURE — 6360000002 HC RX W HCPCS

## 2024-09-26 PROCEDURE — 80048 BASIC METABOLIC PNL TOTAL CA: CPT

## 2024-09-26 PROCEDURE — 97535 SELF CARE MNGMENT TRAINING: CPT

## 2024-09-26 PROCEDURE — 82947 ASSAY GLUCOSE BLOOD QUANT: CPT

## 2024-09-26 PROCEDURE — 2580000003 HC RX 258

## 2024-09-26 PROCEDURE — 99232 SBSQ HOSP IP/OBS MODERATE 35: CPT | Performed by: STUDENT IN AN ORGANIZED HEALTH CARE EDUCATION/TRAINING PROGRAM

## 2024-09-26 PROCEDURE — 99232 SBSQ HOSP IP/OBS MODERATE 35: CPT | Performed by: INTERNAL MEDICINE

## 2024-09-26 PROCEDURE — 36415 COLL VENOUS BLD VENIPUNCTURE: CPT

## 2024-09-26 PROCEDURE — 2060000000 HC ICU INTERMEDIATE R&B

## 2024-09-26 PROCEDURE — 99233 SBSQ HOSP IP/OBS HIGH 50: CPT | Performed by: NURSE PRACTITIONER

## 2024-09-26 PROCEDURE — 6370000000 HC RX 637 (ALT 250 FOR IP): Performed by: INTERNAL MEDICINE

## 2024-09-26 PROCEDURE — 83735 ASSAY OF MAGNESIUM: CPT

## 2024-09-26 RX ORDER — METOPROLOL TARTRATE 25 MG/1
25 TABLET, FILM COATED ORAL 2 TIMES DAILY
Status: DISCONTINUED | OUTPATIENT
Start: 2024-09-26 | End: 2024-09-26

## 2024-09-26 RX ORDER — TORSEMIDE 20 MG/1
40 TABLET ORAL DAILY
Status: DISCONTINUED | OUTPATIENT
Start: 2024-09-26 | End: 2024-09-27

## 2024-09-26 RX ORDER — METOPROLOL TARTRATE 25 MG/1
25 TABLET, FILM COATED ORAL 2 TIMES DAILY
Status: DISCONTINUED | OUTPATIENT
Start: 2024-09-26 | End: 2024-09-27 | Stop reason: HOSPADM

## 2024-09-26 RX ORDER — ESTRADIOL 1 MG/1
0.5 TABLET ORAL 2 TIMES DAILY
Status: DISCONTINUED | OUTPATIENT
Start: 2024-09-26 | End: 2024-09-27 | Stop reason: HOSPADM

## 2024-09-26 RX ORDER — MAGNESIUM SULFATE IN WATER 40 MG/ML
2000 INJECTION, SOLUTION INTRAVENOUS ONCE
Status: COMPLETED | OUTPATIENT
Start: 2024-09-26 | End: 2024-09-26

## 2024-09-26 RX ORDER — POLYETHYLENE GLYCOL 3350 17 G/17G
17 POWDER, FOR SOLUTION ORAL DAILY
Status: DISCONTINUED | OUTPATIENT
Start: 2024-09-26 | End: 2024-09-27 | Stop reason: HOSPADM

## 2024-09-26 RX ORDER — AMLODIPINE BESYLATE 5 MG/1
5 TABLET ORAL NIGHTLY
Status: DISCONTINUED | OUTPATIENT
Start: 2024-09-26 | End: 2024-09-27 | Stop reason: HOSPADM

## 2024-09-26 RX ORDER — SENNA AND DOCUSATE SODIUM 50; 8.6 MG/1; MG/1
2 TABLET, FILM COATED ORAL DAILY
Status: DISCONTINUED | OUTPATIENT
Start: 2024-09-26 | End: 2024-09-27 | Stop reason: HOSPADM

## 2024-09-26 RX ADMIN — SODIUM CHLORIDE, PRESERVATIVE FREE 10 ML: 5 INJECTION INTRAVENOUS at 08:27

## 2024-09-26 RX ADMIN — HEPARIN SODIUM 5000 UNITS: 5000 INJECTION INTRAVENOUS; SUBCUTANEOUS at 21:03

## 2024-09-26 RX ADMIN — LEVOTHYROXINE SODIUM 125 MCG: 75 TABLET ORAL at 05:56

## 2024-09-26 RX ADMIN — METOPROLOL TARTRATE 50 MG: 50 TABLET, FILM COATED ORAL at 08:26

## 2024-09-26 RX ADMIN — ISOSORBIDE MONONITRATE 60 MG: 60 TABLET, EXTENDED RELEASE ORAL at 08:26

## 2024-09-26 RX ADMIN — SODIUM CHLORIDE, PRESERVATIVE FREE 10 ML: 5 INJECTION INTRAVENOUS at 21:04

## 2024-09-26 RX ADMIN — ESTRADIOL 0.5 MG: 1 TABLET ORAL at 10:13

## 2024-09-26 RX ADMIN — INSULIN LISPRO 2 UNITS: 100 INJECTION, SOLUTION INTRAVENOUS; SUBCUTANEOUS at 21:03

## 2024-09-26 RX ADMIN — METOPROLOL TARTRATE 25 MG: 25 TABLET, FILM COATED ORAL at 19:14

## 2024-09-26 RX ADMIN — HEPARIN SODIUM 5000 UNITS: 5000 INJECTION INTRAVENOUS; SUBCUTANEOUS at 16:51

## 2024-09-26 RX ADMIN — ROPINIROLE HYDROCHLORIDE 0.25 MG: 0.25 TABLET, FILM COATED ORAL at 08:26

## 2024-09-26 RX ADMIN — TORSEMIDE 40 MG: 20 TABLET ORAL at 19:14

## 2024-09-26 RX ADMIN — CLOPIDOGREL BISULFATE 75 MG: 75 TABLET ORAL at 08:26

## 2024-09-26 RX ADMIN — POLYETHYLENE GLYCOL 3350 17 G: 17 POWDER, FOR SOLUTION ORAL at 10:20

## 2024-09-26 RX ADMIN — ROPINIROLE HYDROCHLORIDE 0.25 MG: 0.25 TABLET, FILM COATED ORAL at 16:51

## 2024-09-26 RX ADMIN — SENNOSIDES AND DOCUSATE SODIUM 2 TABLET: 50; 8.6 TABLET ORAL at 10:21

## 2024-09-26 RX ADMIN — ASPIRIN 81 MG 81 MG: 81 TABLET ORAL at 08:26

## 2024-09-26 RX ADMIN — HEPARIN SODIUM 5000 UNITS: 5000 INJECTION INTRAVENOUS; SUBCUTANEOUS at 05:56

## 2024-09-26 RX ADMIN — ESTRADIOL 0.5 MG: 1 TABLET ORAL at 21:03

## 2024-09-26 RX ADMIN — ISOSORBIDE MONONITRATE 60 MG: 60 TABLET, EXTENDED RELEASE ORAL at 21:03

## 2024-09-26 RX ADMIN — FAMOTIDINE 20 MG: 20 TABLET, FILM COATED ORAL at 08:26

## 2024-09-26 RX ADMIN — ATORVASTATIN CALCIUM 40 MG: 40 TABLET, FILM COATED ORAL at 21:03

## 2024-09-26 RX ADMIN — AMLODIPINE BESYLATE 5 MG: 5 TABLET ORAL at 21:03

## 2024-09-26 RX ADMIN — Medication 5 MG: at 21:03

## 2024-09-26 RX ADMIN — MAGNESIUM SULFATE HEPTAHYDRATE 2000 MG: 40 INJECTION, SOLUTION INTRAVENOUS at 10:30

## 2024-09-26 RX ADMIN — ROPINIROLE HYDROCHLORIDE 0.25 MG: 0.25 TABLET, FILM COATED ORAL at 21:31

## 2024-09-26 NOTE — PROGRESS NOTES
Renal Progress Note    Patient :  Rossana Alcantara; 89 y.o. MRN# [id number]  Location:  0421/0421-02  Attending:  Alee Gallegos MD  Admit Date:  9/19/2024   Hospital Day: 6      Subjective:     Patient seen and examined bedside   Denied any complaints, she is on room air  Denies any shortness of breath ,denies any chest pain   Am labs reviewed , BMP with sodium 135, potassium 4.1, chloride 95, bicarb 26, anion gap 14, BUN 45, creatinine 2.2  - Her IV access was lost, and she has not been able to receive Lasix in the morning, her urine output has been 2.4 L    History reviewed  Known history of coronary artery disease history of coronary bypass graft, chronic kidney disease baseline 1.1-1.2, hypertension, hypothyroidism, cardiomyopathy ejection fraction 30%.  Came into the hospital with complaints of chest pain and shortness of breath.  Troponins were significantly elevated.  She received contrast on admission for CT PE and CT abdomen.  Was negative for PE.  She is underwent cardiac cath which showed occluded RCA graft with significant distal disease.  Rest grafts were patent.  Creatinine from baseline of 1.2 went up to 2.3.  Nephrology consulted for acute kidney injury.      Outpatient Medications:     Medications Prior to Admission: allopurinol (ZYLOPRIM) 300 MG tablet, Take 1 tablet by mouth daily  atorvastatin (LIPITOR) 10 MG tablet, Take 4 tablets by mouth daily  estradiol (ESTRACE) 0.5 MG tablet, Take 1 tablet by mouth daily  isosorbide mononitrate (IMDUR) 60 MG extended release tablet, Take 1 tablet by mouth daily  levothyroxine (SYNTHROID) 125 MCG tablet, Take 1 tablet by mouth Daily  isosorbide mononitrate (IMDUR) 60 MG extended release tablet, Take 1 tablet by mouth daily  nitroGLYCERIN (NITROSTAT) 0.4 MG SL tablet, Place 1 tablet under the tongue every 5 minutes as needed  aspirin 81 MG tablet, Take 1 tablet by mouth daily  Coenzyme Q-10 100 MG CAPS, Take 1 tablet by mouth daily.  Multiple  AM    MUCUS 1+ 05/08/2014 11:02 AM    TRICHOMONAS NOT REPORTED 05/08/2014 11:02 AM    YEAST NOT REPORTED 05/08/2014 11:02 AM    BACTERIA None 09/22/2024 06:52 AM    LEUKOCYTESUR SMALL 09/22/2024 06:52 AM    UROBILINOGEN Normal 09/22/2024 06:52 AM    BILIRUBINUR NEGATIVE 09/22/2024 06:52 AM    GLUCOSEU NEGATIVE 09/22/2024 06:52 AM    KETUA NEGATIVE 09/22/2024 06:52 AM    AMORPHOUS NOT REPORTED 05/08/2014 11:02 AM     Urine Chloride:    Lab Results   Component Value Date/Time    CLUR 28 09/22/2024 06:52 AM     Urine Osmolarity:   Lab Results   Component Value Date/Time    OSMOU 455 09/22/2024 06:52 AM       Assessment:     1. Acute Kidney Injury: Secondary to nephrotoxic ATN from contrast exposure, received cardiac catheterization 9/20/2024 also received CT PE on 9/20/2024 baseline 1.2 peaked up to 2.5 creatinine from today 2.2, urine output improved   2.  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  Non-ST elevation MI/acute coronary syndrome cardiac cath done on 9/20/2024 for medical therapy at present  4.  Ischemic cardiomyopathy ejection fraction 30%  5.  Hypertension   6.  Decompensated left heart failure secondary to above and acute kidney injury further aggravated by hypotension  7.  Paroxysmal atrial fibrillation on any anticoagulation    Plan:   1.  Will change diuretics to 40 mg p.o. daily.    2.  Strict intake output record Daily weights  3.  Check BMP daily  4.Will continue to follow  5.  Discharge planning to Acute rehab , pending pre cert     Nutrition   Please ensure that patient is on a renal diet/TF. Avoid nephrotoxic drugs/contrast exposure.    We will continue to follow along with you.     Lianne Ramirez MD  Internal Medicine Resident, PGY-2     Attending Physician Statement  I have discussed the care of this patient, including pertinent history and exam findings, with the Resident/CNP. I have seen and examined the patient

## 2024-09-26 NOTE — PROGRESS NOTES
Occupational Therapy  Facility/Department: [address] STEPDOWN   Daily Treatment Note      Patient Name: Rossana Alcantara        MRN: [id number]    : 1930    Date of Service: 2024    Discharge Recommendations  Discharge Recommendations: Patient would benefit from continued therapy after discharge       Assessment  Performance deficits / Impairments: Decreased functional mobility ;Decreased endurance;Decreased ADL status;Decreased balance;Decreased high-level IADLs  Assessment: Pt previously independent prior to admission. Pt progressing towards goals this session as stated below. Pt limited by above deficits impacting pts functional mobility/ADL performance. Pt is expected to require skilled OT services to increase safety and promote increased independence t/o ADL/IADLs and functional mobility tasks.  Prognosis: Good  Activity Tolerance  Activity Tolerance: Patient Tolerated treatment well  Safety Devices  Type of Devices: Gait belt;Call light within reach;Nurse notified;Patient at risk for falls;Heels elevated for pressure relief;Left in chair  Restraints  Restraints Initially in Place: No    Restrictions/Precautions  Restrictions/Precautions  Restrictions/Precautions: Fall Risk;Up as Tolerated  Required Braces or Orthoses?: No  Position Activity Restriction  Other position/activity restrictions: Amb pt, up w/ A    Subjective  General  Patient assessed for rehabilitation services?: Yes  Response to previous treatment: Patient with no complaints from previous session  Family / Caregiver Present: No  Diagnosis: NSTEMI, chest pain, hyponatremia, fatigue, hx of CABG/A-fib  General Comment  Comments: RN ok'd pt for OT this date. Pt agreeable, pleasant and cooperative t/o. Pt denied pain this date.    Objective  Orientation  Overall Orientation Status: Within Functional Limits  Cognition  Overall Cognitive Status: WFL    Activities of Daily Living  Grooming: Modified independent   Grooming Skilled Clinical

## 2024-09-26 NOTE — PLAN OF CARE
Problem: Discharge Planning  Goal: Discharge to home or other facility with appropriate resources  9/25/2024 2046 by Juany Truong RN  Outcome: Progressing  9/25/2024 1001 by Ivy Delgado RN  Outcome: Progressing     Problem: Pain  Goal: Verbalizes/displays adequate comfort level or baseline comfort level  9/25/2024 2046 by Juany Truong RN  Outcome: Progressing  9/25/2024 1001 by Ivy Delgado RN  Outcome: Progressing     Problem: Skin/Tissue Integrity  Goal: Absence of new skin breakdown  Description: 1.  Monitor for areas of redness and/or skin breakdown  2.  Assess vascular access sites hourly  3.  Every 4-6 hours minimum:  Change oxygen saturation probe site  4.  Every 4-6 hours:  If on nasal continuous positive airway pressure, respiratory therapy assess nares and determine need for appliance change or resting period.  9/25/2024 2046 by Juany Truong RN  Outcome: Progressing  9/25/2024 1001 by Ivy Delgado RN  Outcome: Progressing     Problem: Safety - Adult  Goal: Free from fall injury  9/25/2024 2046 by Juany Truong RN  Outcome: Progressing  9/25/2024 1001 by Ivy Delgado RN  Outcome: Progressing     Problem: ABCDS Injury Assessment  Goal: Absence of physical injury  9/25/2024 2046 by Juany Truong RN  Outcome: Progressing  9/25/2024 1001 by Ivy Delgado RN  Outcome: Progressing     Problem: Chronic Conditions and Co-morbidities  Goal: Patient's chronic conditions and co-morbidity symptoms are monitored and maintained or improved  9/25/2024 2046 by Juany Truong RN  Outcome: Progressing  9/25/2024 1001 by Ivy Delgado RN  Outcome: Progressing

## 2024-09-26 NOTE — CARE COORDINATION
ACUTE INPATIENT REHABILITATION  [facility]  PRE-ADMISSION ASSESSMENT    Patient Name: Rossana Alcantara        MRN: [id number]    : 1930  (89 y.o.)  Gender: female   Ethnicity: Not , /a or Japanese Origin  Race: White    Admitted from:  [facility]     Type of Admission:  New Admission     Date of Onset / Admission to the Acute Hospital:  2024    Inpatient Rehabilitation Admitting Diagnosis:  Debility secondary to NSTEMI     Did patient have surgery/procedures?  No     Physicians:   Dakotah Monique DO  Physician  Cardiology    Alee Gallegos MD  Physician  Internal Medicine    Victorino Valdez MD  Physician  Nephrology      Risk for Clinical Complications:  Moderate     Co-morbidities:    NSTEMI occluded SVG to RCA and severe disease RCA/PCI medical management due to advanced age and multiple comorbidities-aspirin, Plavix  Cardiomyopathy ejection fraction 30% LifeVest   Elevated troponin felt to be secondary to high BNP low ejection fraction pulmonary edema  AMADO/CKD on Lasix possible cardiorenal-IV Lasix-creatinine 2.5  Proximal A-fib not on anticoagulation due to high cost-Per internal medicine notes cardiology recommends continue aspirin  Anemia on iron  Orthostatic hypotension  Diabetes  Hypertension/hyperlipidemia/orthostatic hypotension-on Norvasc Imdur, metoprolol IV Lasix  Hypoxic respiratory failure secondary to acute on chronic CHF/pulmonary edema  Pain oxycodone  GERD-Pepcid  Restless leg-Requip  Noted patient DNR CC arrest no intubation noted patient does not want dialysis per note  Anemia hemoglobin 7.8    Financial Information  Primary insurance: Medicare HMO: BCBS Medicare      Secondary Insurance: None     Precautions:   []Cardiac Precautions: No Cardiac Precautions  []Total hip precautions: No Total Hip Precautions  []Weight Bearing status: No Weight Bearing Restrictions  [x]Safety Precautions/Concerns:  Fall Risk, General

## 2024-09-26 NOTE — PROGRESS NOTES
[facility]   Progress Note                   Date:   9/26/2024  Patient name: Rossana Alcantara  Date of admission:  9/19/2024 10:57 PM  MRN:   [id number]  YOB: 1930  PCP: Modetso Ayala MD    Reason for Admission: NSTEMI (non-ST elevated myocardial infarction) ([facility]) [I21.4]  Troponin level elevated [R79.89]  Acute on chronic congestive heart failure, unspecified heart failure type ([facility]) [I50.9]    Subjective:       Clinical Changes / Abnormalities: Pt seen and examined in the room.  Patient resting in chair, family in room.  Pt denies any CP or sob, lifevest on.  Labs, vitals and tele reviewed- SR       Medications:   Scheduled Meds:   magnesium sulfate  2,000 mg IntraVENous Once    estradiol  0.5 mg Oral BID    sennosides-docusate sodium  2 tablet Oral Daily    polyethylene glycol  17 g Oral Daily    amLODIPine  5 mg Oral Nightly    metoprolol tartrate  25 mg Oral BID    heparin (porcine)  5,000 Units SubCUTAneous 3 times per day    furosemide  80 mg IntraVENous Daily    isosorbide mononitrate  60 mg Oral BID    famotidine  20 mg Oral Daily    rOPINIRole  0.25 mg Oral TID    insulin lispro  0-8 Units SubCUTAneous 4x Daily AC & HS    levothyroxine  125 mcg Oral Daily    sodium chloride flush  5-40 mL IntraVENous 2 times per day    melatonin  5 mg Oral Nightly    sodium chloride flush  5-40 mL IntraVENous 2 times per day    atorvastatin  40 mg Oral Daily    clopidogrel  75 mg Oral Daily    aspirin  81 mg Oral Daily     Continuous Infusions:   sodium chloride      dextrose      sodium chloride       CBC:   Recent Labs     09/24/24  0236 09/25/24  1015 09/26/24  0254   WBC 5.2 5.9 4.6   HGB 7.8* 9.1* 8.8*    184 158     BMP:    Recent Labs     09/24/24  0236 09/25/24  1015 09/26/24  0254   * 134* 135*   K 4.6 4.4 4.1   CL 98 94* 95*   CO2 22 26 26   BUN 36* 40* 45*   CREATININE 2.5* 2.4* 2.2*   GLUCOSE 120* 262* 137*     Hepatic:   No results for input(s): \"AST\", \"ALT\",

## 2024-09-26 NOTE — PLAN OF CARE
Problem: Discharge Planning  Goal: Discharge to home or other facility with appropriate resources  9/26/2024 0822 by Ivy Delgado RN  Outcome: Progressing  9/25/2024 2046 by Juany Truong RN  Outcome: Progressing     Problem: Pain  Goal: Verbalizes/displays adequate comfort level or baseline comfort level  9/26/2024 0822 by Ivy Delgado RN  Outcome: Progressing  9/25/2024 2046 by Juany Truong RN  Outcome: Progressing     Problem: Skin/Tissue Integrity  Goal: Absence of new skin breakdown  Description: 1.  Monitor for areas of redness and/or skin breakdown  2.  Assess vascular access sites hourly  3.  Every 4-6 hours minimum:  Change oxygen saturation probe site  4.  Every 4-6 hours:  If on nasal continuous positive airway pressure, respiratory therapy assess nares and determine need for appliance change or resting period.  9/26/2024 0822 by Ivy Delgado RN  Outcome: Progressing  9/25/2024 2046 by Juany Truong RN  Outcome: Progressing     Problem: Safety - Adult  Goal: Free from fall injury  9/26/2024 0822 by Ivy Delgado RN  Outcome: Progressing  9/25/2024 2046 by Juany Truong RN  Outcome: Progressing     Problem: ABCDS Injury Assessment  Goal: Absence of physical injury  9/26/2024 0822 by Ivy Delgado RN  Outcome: Progressing  9/25/2024 2046 by Juany Truong RN  Outcome: Progressing     Problem: Chronic Conditions and Co-morbidities  Goal: Patient's chronic conditions and co-morbidity symptoms are monitored and maintained or improved  9/26/2024 0822 by Ivy Delgado RN  Outcome: Progressing  9/25/2024 2046 by Juany Truong RN  Outcome: Progressing

## 2024-09-26 NOTE — PROGRESS NOTES
Peer to peer completed with Dr. Farooq Almanzar-reviewed therapy needs, medical needs, etc.-patient approved for acute inpatient rehabilitation for transfer within the next every 72 hours

## 2024-09-26 NOTE — CARE COORDINATION
Transitional planning note: plan is [facility]. [facility] request needs [facility] to peer to be called by 8am tomorrow. To arrange peer to peer, provider must call [phone] for case # [id number] and provide patient's name, date of birth, and insurance ID #. Call placed to [facility] admissions and spoke with Tri to provide the above information. She will reach out to Dr Burrell with [facility] to request he completes the peer to peer.     1700: received call from tri with [facility]. She states dr burrell will do peer to peer

## 2024-09-26 NOTE — PROGRESS NOTES
[facility] [place]  Internal Medicine Teaching Residency Program  Inpatient Daily Progress Note  ______________________________________________________________________________    Patient: Rossana Alcantara  YOB: 1930   MRN:[id number]    Acct: [id number]     Room: [facility] [place]042-02  Admit date: 9/19/2024  Today's date: 09/26/24  Number of days in the hospital: 6    SUBJECTIVE   Admitting Diagnosis: NSTEMI (non-ST elevated myocardial infarction) (HCC)  CC: chest pain     ICU transfer / step down     Pt examined at bedside. Chart & results reviewed.   Generally well, no active concerns or symptoms   Afebrile and vitals stable - saturating 95 % on room air     No further episodes of chest pains, breathing also much better. E&D fine. No N&V or abdo pain. No further dizziness    Lifevest placed yesterday.     Input 790 ml and output 2490 - on Lasix 80 mg IV      Bloods:   Creat: 1.2 > 1.6 > 2.3 > 2.5 > 2.5 > 2.4 > 2.2   BUN:  22 > 24 > 28 > 30 > 36 > 40 > 45   Na: 134 > 135   Trop: 123 > 292 > 1592 > 1611 > 997 > 1305 > 1882 > 1725 > 2080 > 2116   BNP 47,176   Hb 7.7 > 8.3 > 7.8 > 9.1 > 8.8   Echo - 9/21/24 - Mildly reduced LVSF with EF 30-35 %, mild concentric LVH, mild to mod TR - RVSP 43 mmHg       ROS:  Constitutional:  negative for chills, fevers, sweats  Respiratory:  negative for cough, dyspnea on exertion, hemoptysis, wheezing  Cardiovascular:  negative for chest pain, chest pressure/discomfort, lower extremity edema, palpitations  Gastrointestinal:  negative for abdominal pain, constipation, diarrhea, nausea, vomiting  Neurological:  negative for dizziness, headache  BRIEF HISTORY   Rossana Alcantara is 89-year-old female whose history includes CAD s/p CABG in 2014, mitral regurgitation, LBBB, hypertension, hyperlipidemia, CHF, hypothyroidism and paroxysmal AF not on anticoagulation.  She woke up on Thursday morning, 9/19/2024, and was feeling fatigued throughout  No involuntary movements are noted.    Skin:  Warm and dry.  Good color, turgor and pigmentation. No lesions or scars.  No cyanosis or clubbing  Neurological/Psychiatric: The patient's general behavior, level of consciousness, thought content and emotional status is normal.        Medications:  Scheduled Medications:    heparin (porcine)  5,000 Units SubCUTAneous 3 times per day    amLODIPine  2.5 mg Oral Nightly    furosemide  80 mg IntraVENous Daily    metoprolol tartrate  50 mg Oral BID    isosorbide mononitrate  60 mg Oral BID    famotidine  20 mg Oral Daily    rOPINIRole  0.25 mg Oral TID    insulin lispro  0-8 Units SubCUTAneous 4x Daily AC & HS    levothyroxine  125 mcg Oral Daily    sodium chloride flush  5-40 mL IntraVENous 2 times per day    melatonin  5 mg Oral Nightly    sodium chloride flush  5-40 mL IntraVENous 2 times per day    atorvastatin  40 mg Oral Daily    clopidogrel  75 mg Oral Daily    aspirin  81 mg Oral Daily     Continuous Infusions:    sodium chloride      dextrose      sodium chloride       PRN MedicationsoxyCODONE-acetaminophen, 1 tablet, Q6H PRN  nitroGLYCERIN, 0.4 mg, Q5 Min PRN  hydrALAZINE, 10 mg, Q6H PRN  albuterol sulfate HFA, 2 puff, Q6H PRN  sodium chloride flush, 5-40 mL, PRN  sodium chloride, , PRN  ondansetron, 4 mg, Q8H PRN   Or  ondansetron, 4 mg, Q6H PRN  polyethylene glycol, 17 g, Daily PRN  glucose, 4 tablet, PRN  dextrose bolus, 125 mL, PRN   Or  dextrose bolus, 250 mL, PRN  glucagon (rDNA), 1 mg, PRN  dextrose, , Continuous PRN  sodium chloride flush, 5-40 mL, PRN  sodium chloride, , PRN  acetaminophen, 650 mg, Q4H PRN        Diagnostic Labs:  CBC:   Recent Labs     09/24/24  0236 09/25/24  1015 09/26/24  0254   WBC 5.2 5.9 4.6   RBC 1.96* 2.32* 2.24*   HGB 7.8* 9.1* 8.8*   HCT 24.6* 27.6* 26.9*   .5* 119.0* 120.1*   RDW 15.1* 15.0* 14.6*    184 158     BMP:   Recent Labs     09/24/24  0236 09/25/24  1015 09/26/24  0254   * 134* 135*   K 4.6 4.4 4.1

## 2024-09-26 NOTE — PRE-CERTIFICATION NOTE
Received Telephone Call from [facility] ELENITA Meng regarding    Intent to deny admission to Acute Inpatient Rehabilitation Stay under Auth/CaseRef# [id number]     Rationale to deny Acute Inpatient Rehabilitation level of care: Patient unable to participate in 3 hours of therapy per day, does not need to be seen by a Rehab MD 3x weekly.    Peer to Peer Deadline: 2024 8:00 am (CST) / 9:00 am (EST)     Peer to Peer Instructions: Call directly at [phone] and provide patient identifiers above (Name, , Member ID)    Per [facility] physiatrist Dr. Gustavo Burrell, Pursue peer to peer to attempt to overturn denial: Scheduled for N/A - Dr. Burrell will call in directly per instructions for completion prior to deadline    Updated Yusra KWONG: Via Telephone Conversation at this time at phone/extension: [phone]     5:38 pm Dr. Burrell completed peer to peer, denial overturned and patient approved to admit within next 72 hours. Need portal status to show approval, portal checked at this time status remains pending. LifeVest will need placed prior to admission -  left at [phone] to update requested call back for care transition coordination.

## 2024-09-26 NOTE — PROGRESS NOTES
Physical Medicine & Rehabilitation  Progress Note    9/26/2024 10:29 AM     CC: Ambulatory and ADL dysfunction due to debility second NSTEMI    Cardiology echo 3035% LifeVest on, Lasix    Nephrology patient does not want dialysis continued aspirin med management    Nephrology continue IV Lasix creatinine peaked to 2.5 urine output improving baseline creatinine 1.21.4    Internal medicine has not been on anticoagulation for A-fib because of anemia, cardio recommended continue with aspirin    Subjective:   No complaints.  Feels well.  family present    ROS:  Denies fevers, chills, sweats.  No chest pain, palpitations, lightheadedness.  Denies coughing, wheezing or shortness of breath.  Denies abdominal pain, nausea, diarrhea or constipation.  No new areas of joint pain.  Denies new areas of numbness or weakness.  Denies new anxiety or depression issues.  No new skin problems.    Rehabilitation:   PT:    Bed mobility  Supine to Sit: Supervision  Sit to Supine: Unable to assess  Scooting: Modified independent  Bed Mobility Comments: HOB elevated 45 degrees, bedrail use. Pt concluded session in chair.    Transfers  Sit to Stand: Stand by assistance  Stand to Sit: Stand by assistance  Comment: Transfers performed with a RW    Ambulation  Surface: Level tile  Device: Rolling Walker  Assistance: Contact guard assistance  Quality of Gait: mild instability, decreased gait speed, forward flexed trunk, no true LOB.  Gait Deviations: Slow Jordyn, Decreased step length  Distance: 40ft, 20ft  Comments: decreased endurance  More Ambulation?: No                OT:    Activities of Daily Living  Grooming: Modified independent   Grooming Skilled Clinical Factors: Pt stood at sink to complete oral hygiene and comb hair with no LOB and good tolerance. Pt wash face, neck and hands while in shower and donned deoderant while seated on toilet  UE Bathing: Modified independent   UE Bathing Skilled Clinical Factors: Pt able to wash BUE and

## 2024-09-27 ENCOUNTER — HOSPITAL ENCOUNTER (INPATIENT)
Age: 89
LOS: 7 days | Discharge: HOME HEALTH | End: 2024-10-04
Attending: STUDENT IN AN ORGANIZED HEALTH CARE EDUCATION/TRAINING PROGRAM | Admitting: STUDENT IN AN ORGANIZED HEALTH CARE EDUCATION/TRAINING PROGRAM
Payer: MEDICARE

## 2024-09-27 VITALS
OXYGEN SATURATION: 95 % | DIASTOLIC BLOOD PRESSURE: 68 MMHG | BODY MASS INDEX: 27.25 KG/M2 | TEMPERATURE: 98.1 F | HEART RATE: 75 BPM | HEIGHT: 65 IN | WEIGHT: 163.58 LBS | SYSTOLIC BLOOD PRESSURE: 124 MMHG | RESPIRATION RATE: 18 BRPM

## 2024-09-27 PROBLEM — R53.81 DEBILITY: Status: ACTIVE | Noted: 2024-09-27

## 2024-09-27 LAB
ANION GAP SERPL CALCULATED.3IONS-SCNC: 15 MMOL/L (ref 9–16)
BASOPHILS # BLD: 0 K/UL (ref 0–0.2)
BASOPHILS NFR BLD: 0 % (ref 0–2)
BUN SERPL-MCNC: 50 MG/DL (ref 8–23)
CALCIUM SERPL-MCNC: 9 MG/DL (ref 8.6–10.4)
CHLORIDE SERPL-SCNC: 94 MMOL/L (ref 98–107)
CO2 SERPL-SCNC: 26 MMOL/L (ref 20–31)
CREAT SERPL-MCNC: 1.9 MG/DL (ref 0.5–0.9)
EOSINOPHIL # BLD: 0.1 K/UL (ref 0–0.44)
EOSINOPHILS RELATIVE PERCENT: 2 % (ref 1–4)
ERYTHROCYTE [DISTWIDTH] IN BLOOD BY AUTOMATED COUNT: 14.6 % (ref 11.8–14.4)
GFR, ESTIMATED: 24 ML/MIN/1.73M2
GLUCOSE BLD-MCNC: 156 MG/DL (ref 65–105)
GLUCOSE BLD-MCNC: 176 MG/DL (ref 65–105)
GLUCOSE BLD-MCNC: 204 MG/DL (ref 65–105)
GLUCOSE BLD-MCNC: 211 MG/DL (ref 65–105)
GLUCOSE SERPL-MCNC: 168 MG/DL (ref 74–99)
HCT VFR BLD AUTO: 27.5 % (ref 36.3–47.1)
HGB BLD-MCNC: 9 G/DL (ref 11.9–15.1)
IMM GRANULOCYTES # BLD AUTO: 0 K/UL (ref 0–0.3)
IMM GRANULOCYTES NFR BLD: 0 %
LYMPHOCYTES NFR BLD: 2.03 K/UL (ref 1.1–3.7)
LYMPHOCYTES RELATIVE PERCENT: 39 % (ref 24–43)
MAGNESIUM SERPL-MCNC: 1.7 MG/DL (ref 1.7–2.3)
MCH RBC QN AUTO: 39.3 PG (ref 25.2–33.5)
MCHC RBC AUTO-ENTMCNC: 32.7 G/DL (ref 28.4–34.8)
MCV RBC AUTO: 120.1 FL (ref 82.6–102.9)
MONOCYTES NFR BLD: 0.26 K/UL (ref 0.1–1.2)
MONOCYTES NFR BLD: 5 % (ref 3–12)
MORPHOLOGY: ABNORMAL
MORPHOLOGY: ABNORMAL
NEUTROPHILS NFR BLD: 54 % (ref 36–65)
NEUTS SEG NFR BLD: 2.81 K/UL (ref 1.5–8.1)
NRBC BLD-RTO: 0 PER 100 WBC
PLATELET # BLD AUTO: 195 K/UL (ref 138–453)
PMV BLD AUTO: 11.5 FL (ref 8.1–13.5)
POTASSIUM SERPL-SCNC: 4.4 MMOL/L (ref 3.7–5.3)
RBC # BLD AUTO: 2.29 M/UL (ref 3.95–5.11)
SODIUM SERPL-SCNC: 135 MMOL/L (ref 136–145)
WBC OTHER # BLD: 5.2 K/UL (ref 3.5–11.3)

## 2024-09-27 PROCEDURE — 6370000000 HC RX 637 (ALT 250 FOR IP): Performed by: PHYSICAL MEDICINE & REHABILITATION

## 2024-09-27 PROCEDURE — 85025 COMPLETE CBC W/AUTO DIFF WBC: CPT

## 2024-09-27 PROCEDURE — 99232 SBSQ HOSP IP/OBS MODERATE 35: CPT | Performed by: INTERNAL MEDICINE

## 2024-09-27 PROCEDURE — 6370000000 HC RX 637 (ALT 250 FOR IP): Performed by: STUDENT IN AN ORGANIZED HEALTH CARE EDUCATION/TRAINING PROGRAM

## 2024-09-27 PROCEDURE — 99223 1ST HOSP IP/OBS HIGH 75: CPT | Performed by: INTERNAL MEDICINE

## 2024-09-27 PROCEDURE — 97110 THERAPEUTIC EXERCISES: CPT

## 2024-09-27 PROCEDURE — 82947 ASSAY GLUCOSE BLOOD QUANT: CPT

## 2024-09-27 PROCEDURE — 2580000003 HC RX 258: Performed by: STUDENT IN AN ORGANIZED HEALTH CARE EDUCATION/TRAINING PROGRAM

## 2024-09-27 PROCEDURE — 80048 BASIC METABOLIC PNL TOTAL CA: CPT

## 2024-09-27 PROCEDURE — 6370000000 HC RX 637 (ALT 250 FOR IP)

## 2024-09-27 PROCEDURE — 83735 ASSAY OF MAGNESIUM: CPT

## 2024-09-27 PROCEDURE — 6360000002 HC RX W HCPCS

## 2024-09-27 PROCEDURE — 97116 GAIT TRAINING THERAPY: CPT

## 2024-09-27 PROCEDURE — 6370000000 HC RX 637 (ALT 250 FOR IP): Performed by: INTERNAL MEDICINE

## 2024-09-27 PROCEDURE — 36415 COLL VENOUS BLD VENIPUNCTURE: CPT

## 2024-09-27 PROCEDURE — 99232 SBSQ HOSP IP/OBS MODERATE 35: CPT | Performed by: STUDENT IN AN ORGANIZED HEALTH CARE EDUCATION/TRAINING PROGRAM

## 2024-09-27 PROCEDURE — 6370000000 HC RX 637 (ALT 250 FOR IP): Performed by: NURSE PRACTITIONER

## 2024-09-27 PROCEDURE — 1180000000 HC REHAB R&B

## 2024-09-27 PROCEDURE — 97530 THERAPEUTIC ACTIVITIES: CPT

## 2024-09-27 RX ORDER — ONDANSETRON 4 MG/1
4 TABLET, ORALLY DISINTEGRATING ORAL EVERY 8 HOURS PRN
Status: CANCELLED | OUTPATIENT
Start: 2024-09-27

## 2024-09-27 RX ORDER — ESTRADIOL 1 MG/1
0.5 TABLET ORAL 2 TIMES DAILY
Status: DISCONTINUED | OUTPATIENT
Start: 2024-09-27 | End: 2024-10-04 | Stop reason: HOSPADM

## 2024-09-27 RX ORDER — BISACODYL 10 MG
10 SUPPOSITORY, RECTAL RECTAL DAILY PRN
Status: DISCONTINUED | OUTPATIENT
Start: 2024-09-27 | End: 2024-10-04 | Stop reason: HOSPADM

## 2024-09-27 RX ORDER — ONDANSETRON 2 MG/ML
4 INJECTION INTRAMUSCULAR; INTRAVENOUS EVERY 6 HOURS PRN
Status: DISCONTINUED | OUTPATIENT
Start: 2024-09-27 | End: 2024-09-27

## 2024-09-27 RX ORDER — POLYETHYLENE GLYCOL 3350 17 G/17G
17 POWDER, FOR SOLUTION ORAL DAILY
Status: DISCONTINUED | OUTPATIENT
Start: 2024-09-28 | End: 2024-10-04 | Stop reason: HOSPADM

## 2024-09-27 RX ORDER — METOPROLOL TARTRATE 25 MG/1
25 TABLET, FILM COATED ORAL 2 TIMES DAILY
Status: CANCELLED | OUTPATIENT
Start: 2024-09-27

## 2024-09-27 RX ORDER — POLYETHYLENE GLYCOL 3350 17 G/17G
17 POWDER, FOR SOLUTION ORAL DAILY
Status: ON HOLD | DISCHARGE
Start: 2024-09-28 | End: 2024-11-29

## 2024-09-27 RX ORDER — FAMOTIDINE 20 MG/1
20 TABLET, FILM COATED ORAL DAILY
Status: DISCONTINUED | OUTPATIENT
Start: 2024-09-28 | End: 2024-10-04 | Stop reason: HOSPADM

## 2024-09-27 RX ORDER — POLYETHYLENE GLYCOL 3350 17 G/17G
17 POWDER, FOR SOLUTION ORAL DAILY
Status: CANCELLED | OUTPATIENT
Start: 2024-09-28

## 2024-09-27 RX ORDER — HYDRALAZINE HYDROCHLORIDE 20 MG/ML
10 INJECTION INTRAMUSCULAR; INTRAVENOUS EVERY 6 HOURS PRN
Status: DISCONTINUED | OUTPATIENT
Start: 2024-09-27 | End: 2024-09-27

## 2024-09-27 RX ORDER — OXYCODONE AND ACETAMINOPHEN 5; 325 MG/1; MG/1
1 TABLET ORAL EVERY 6 HOURS PRN
Status: DISCONTINUED | OUTPATIENT
Start: 2024-09-27 | End: 2024-10-04 | Stop reason: HOSPADM

## 2024-09-27 RX ORDER — BISACODYL 10 MG
10 SUPPOSITORY, RECTAL RECTAL DAILY PRN
Status: CANCELLED | OUTPATIENT
Start: 2024-09-27

## 2024-09-27 RX ORDER — NITROGLYCERIN 0.4 MG/1
0.4 TABLET SUBLINGUAL EVERY 5 MIN PRN
Status: ON HOLD | DISCHARGE
Start: 2024-09-27

## 2024-09-27 RX ORDER — ATORVASTATIN CALCIUM 40 MG/1
40 TABLET, FILM COATED ORAL DAILY
Status: DISCONTINUED | OUTPATIENT
Start: 2024-09-27 | End: 2024-10-04 | Stop reason: HOSPADM

## 2024-09-27 RX ORDER — DEXTROSE MONOHYDRATE 100 MG/ML
INJECTION, SOLUTION INTRAVENOUS CONTINUOUS PRN
Status: CANCELLED | OUTPATIENT
Start: 2024-09-27

## 2024-09-27 RX ORDER — POLYETHYLENE GLYCOL 3350 17 G/17G
17 POWDER, FOR SOLUTION ORAL DAILY
Status: CANCELLED | OUTPATIENT
Start: 2024-09-27

## 2024-09-27 RX ORDER — AMLODIPINE BESYLATE 5 MG/1
5 TABLET ORAL NIGHTLY
Status: ON HOLD | DISCHARGE
Start: 2024-09-27

## 2024-09-27 RX ORDER — ISOSORBIDE MONONITRATE 60 MG/1
60 TABLET, EXTENDED RELEASE ORAL 2 TIMES DAILY
Status: ON HOLD | DISCHARGE
Start: 2024-09-27

## 2024-09-27 RX ORDER — ACETAMINOPHEN 325 MG/1
650 TABLET ORAL EVERY 4 HOURS PRN
Status: CANCELLED | OUTPATIENT
Start: 2024-09-27

## 2024-09-27 RX ORDER — ROPINIROLE 0.25 MG/1
0.25 TABLET, FILM COATED ORAL 3 TIMES DAILY
Status: CANCELLED | OUTPATIENT
Start: 2024-09-27

## 2024-09-27 RX ORDER — ROPINIROLE 0.25 MG/1
0.25 TABLET, FILM COATED ORAL 3 TIMES DAILY
Status: ON HOLD | DISCHARGE
Start: 2024-09-27

## 2024-09-27 RX ORDER — SODIUM CHLORIDE 0.9 % (FLUSH) 0.9 %
5-40 SYRINGE (ML) INJECTION PRN
Status: DISCONTINUED | OUTPATIENT
Start: 2024-09-27 | End: 2024-10-04 | Stop reason: HOSPADM

## 2024-09-27 RX ORDER — SODIUM CHLORIDE 0.9 % (FLUSH) 0.9 %
5-40 SYRINGE (ML) INJECTION EVERY 12 HOURS SCHEDULED
Status: DISCONTINUED | OUTPATIENT
Start: 2024-09-27 | End: 2024-09-27

## 2024-09-27 RX ORDER — ASPIRIN 81 MG/1
81 TABLET, CHEWABLE ORAL DAILY
Status: CANCELLED | OUTPATIENT
Start: 2024-09-28

## 2024-09-27 RX ORDER — ACETAMINOPHEN 325 MG/1
650 TABLET ORAL EVERY 4 HOURS PRN
Status: DISCONTINUED | OUTPATIENT
Start: 2024-09-27 | End: 2024-10-04 | Stop reason: HOSPADM

## 2024-09-27 RX ORDER — ROPINIROLE 0.25 MG/1
0.25 TABLET, FILM COATED ORAL 3 TIMES DAILY
Status: DISCONTINUED | OUTPATIENT
Start: 2024-09-27 | End: 2024-10-04 | Stop reason: HOSPADM

## 2024-09-27 RX ORDER — ESTRADIOL 1 MG/1
0.5 TABLET ORAL 2 TIMES DAILY
Status: CANCELLED | OUTPATIENT
Start: 2024-09-27

## 2024-09-27 RX ORDER — LEVOTHYROXINE SODIUM 125 UG/1
125 TABLET ORAL DAILY
Status: DISCONTINUED | OUTPATIENT
Start: 2024-09-28 | End: 2024-10-04 | Stop reason: HOSPADM

## 2024-09-27 RX ORDER — SODIUM CHLORIDE 0.9 % (FLUSH) 0.9 %
5-40 SYRINGE (ML) INJECTION PRN
Status: CANCELLED | OUTPATIENT
Start: 2024-09-27

## 2024-09-27 RX ORDER — HYDRALAZINE HYDROCHLORIDE 20 MG/ML
10 INJECTION INTRAMUSCULAR; INTRAVENOUS EVERY 6 HOURS PRN
Status: CANCELLED | OUTPATIENT
Start: 2024-09-27

## 2024-09-27 RX ORDER — ALBUTEROL SULFATE 90 UG/1
2 INHALANT RESPIRATORY (INHALATION) EVERY 6 HOURS PRN
Status: DISCONTINUED | OUTPATIENT
Start: 2024-09-27 | End: 2024-10-04 | Stop reason: HOSPADM

## 2024-09-27 RX ORDER — SENNOSIDES A AND B 8.6 MG/1
2 TABLET, FILM COATED ORAL DAILY PRN
Status: DISCONTINUED | OUTPATIENT
Start: 2024-09-27 | End: 2024-10-04 | Stop reason: HOSPADM

## 2024-09-27 RX ORDER — ATORVASTATIN CALCIUM 40 MG/1
40 TABLET, FILM COATED ORAL DAILY
Status: CANCELLED | OUTPATIENT
Start: 2024-09-27

## 2024-09-27 RX ORDER — AMLODIPINE BESYLATE 5 MG/1
5 TABLET ORAL NIGHTLY
Status: DISCONTINUED | OUTPATIENT
Start: 2024-09-27 | End: 2024-10-04 | Stop reason: HOSPADM

## 2024-09-27 RX ORDER — ESTRADIOL 0.5 MG/1
0.5 TABLET ORAL 2 TIMES DAILY
Status: ON HOLD | DISCHARGE
Start: 2024-09-27

## 2024-09-27 RX ORDER — SODIUM CHLORIDE 9 MG/ML
INJECTION, SOLUTION INTRAVENOUS PRN
Status: DISCONTINUED | OUTPATIENT
Start: 2024-09-27 | End: 2024-10-04 | Stop reason: HOSPADM

## 2024-09-27 RX ORDER — GLUCAGON 1 MG/ML
1 KIT INJECTION PRN
Status: CANCELLED | OUTPATIENT
Start: 2024-09-27

## 2024-09-27 RX ORDER — SODIUM CHLORIDE 0.9 % (FLUSH) 0.9 %
5-40 SYRINGE (ML) INJECTION PRN
Status: DISCONTINUED | OUTPATIENT
Start: 2024-09-27 | End: 2024-09-27

## 2024-09-27 RX ORDER — ASPIRIN 81 MG/1
81 TABLET, CHEWABLE ORAL DAILY
Status: DISCONTINUED | OUTPATIENT
Start: 2024-09-28 | End: 2024-10-04 | Stop reason: HOSPADM

## 2024-09-27 RX ORDER — SENNA AND DOCUSATE SODIUM 50; 8.6 MG/1; MG/1
2 TABLET, FILM COATED ORAL DAILY
Status: ON HOLD | DISCHARGE
Start: 2024-09-28

## 2024-09-27 RX ORDER — AMLODIPINE BESYLATE 5 MG/1
5 TABLET ORAL NIGHTLY
Status: CANCELLED | OUTPATIENT
Start: 2024-09-27

## 2024-09-27 RX ORDER — ACETAMINOPHEN 325 MG/1
650 TABLET ORAL EVERY 4 HOURS PRN
Status: DISCONTINUED | OUTPATIENT
Start: 2024-09-27 | End: 2024-09-27

## 2024-09-27 RX ORDER — SENNOSIDES A AND B 8.6 MG/1
2 TABLET, FILM COATED ORAL DAILY PRN
Status: CANCELLED | OUTPATIENT
Start: 2024-09-27

## 2024-09-27 RX ORDER — OXYCODONE AND ACETAMINOPHEN 5; 325 MG/1; MG/1
1 TABLET ORAL EVERY 6 HOURS PRN
Status: CANCELLED | OUTPATIENT
Start: 2024-09-27

## 2024-09-27 RX ORDER — ONDANSETRON 4 MG/1
4 TABLET, ORALLY DISINTEGRATING ORAL EVERY 8 HOURS PRN
Status: DISCONTINUED | OUTPATIENT
Start: 2024-09-27 | End: 2024-09-30

## 2024-09-27 RX ORDER — ISOSORBIDE MONONITRATE 60 MG/1
60 TABLET, EXTENDED RELEASE ORAL 2 TIMES DAILY
Status: CANCELLED | OUTPATIENT
Start: 2024-09-27

## 2024-09-27 RX ORDER — LANOLIN ALCOHOL/MO/W.PET/CERES
6 CREAM (GRAM) TOPICAL NIGHTLY
Status: DISCONTINUED | OUTPATIENT
Start: 2024-09-27 | End: 2024-10-04 | Stop reason: HOSPADM

## 2024-09-27 RX ORDER — NITROGLYCERIN 0.4 MG/1
0.4 TABLET SUBLINGUAL EVERY 5 MIN PRN
Status: DISCONTINUED | OUTPATIENT
Start: 2024-09-27 | End: 2024-10-04 | Stop reason: HOSPADM

## 2024-09-27 RX ORDER — POLYETHYLENE GLYCOL 3350 17 G/17G
17 POWDER, FOR SOLUTION ORAL DAILY
Status: DISCONTINUED | OUTPATIENT
Start: 2024-09-27 | End: 2024-09-27 | Stop reason: SDUPTHER

## 2024-09-27 RX ORDER — HEPARIN SODIUM 5000 [USP'U]/ML
5000 INJECTION, SOLUTION INTRAVENOUS; SUBCUTANEOUS EVERY 8 HOURS SCHEDULED
Status: DISCONTINUED | OUTPATIENT
Start: 2024-09-27 | End: 2024-09-29

## 2024-09-27 RX ORDER — CLOPIDOGREL BISULFATE 75 MG/1
75 TABLET ORAL DAILY
Status: CANCELLED | OUTPATIENT
Start: 2024-09-28

## 2024-09-27 RX ORDER — HEPARIN SODIUM 5000 [USP'U]/ML
5000 INJECTION, SOLUTION INTRAVENOUS; SUBCUTANEOUS EVERY 8 HOURS SCHEDULED
Status: CANCELLED | OUTPATIENT
Start: 2024-09-27

## 2024-09-27 RX ORDER — FUROSEMIDE 40 MG
40 TABLET ORAL DAILY
Status: DISCONTINUED | OUTPATIENT
Start: 2024-09-27 | End: 2024-09-27 | Stop reason: HOSPADM

## 2024-09-27 RX ORDER — INSULIN LISPRO 100 [IU]/ML
0-8 INJECTION, SOLUTION INTRAVENOUS; SUBCUTANEOUS
Status: DISCONTINUED | OUTPATIENT
Start: 2024-09-27 | End: 2024-10-04 | Stop reason: HOSPADM

## 2024-09-27 RX ORDER — DEXTROSE MONOHYDRATE 100 MG/ML
INJECTION, SOLUTION INTRAVENOUS CONTINUOUS PRN
Status: DISCONTINUED | OUTPATIENT
Start: 2024-09-27 | End: 2024-10-04 | Stop reason: HOSPADM

## 2024-09-27 RX ORDER — METOPROLOL TARTRATE 25 MG/1
25 TABLET, FILM COATED ORAL 2 TIMES DAILY
Status: ON HOLD | DISCHARGE
Start: 2024-09-27

## 2024-09-27 RX ORDER — MAGNESIUM SULFATE IN WATER 40 MG/ML
2000 INJECTION, SOLUTION INTRAVENOUS ONCE
Status: DISCONTINUED | OUTPATIENT
Start: 2024-09-27 | End: 2024-09-27 | Stop reason: HOSPADM

## 2024-09-27 RX ORDER — TORSEMIDE 20 MG/1
40 TABLET ORAL DAILY
Status: CANCELLED | OUTPATIENT
Start: 2024-09-28

## 2024-09-27 RX ORDER — FAMOTIDINE 20 MG/1
20 TABLET, FILM COATED ORAL DAILY
Status: CANCELLED | OUTPATIENT
Start: 2024-09-28

## 2024-09-27 RX ORDER — ALBUTEROL SULFATE 90 UG/1
2 INHALANT RESPIRATORY (INHALATION) EVERY 6 HOURS PRN
Status: CANCELLED | OUTPATIENT
Start: 2024-09-27

## 2024-09-27 RX ORDER — ONDANSETRON 2 MG/ML
4 INJECTION INTRAMUSCULAR; INTRAVENOUS EVERY 6 HOURS PRN
Status: CANCELLED | OUTPATIENT
Start: 2024-09-27

## 2024-09-27 RX ORDER — ISOSORBIDE MONONITRATE 30 MG/1
60 TABLET, EXTENDED RELEASE ORAL 2 TIMES DAILY
Status: DISCONTINUED | OUTPATIENT
Start: 2024-09-27 | End: 2024-10-04 | Stop reason: HOSPADM

## 2024-09-27 RX ORDER — ATORVASTATIN CALCIUM 40 MG/1
40 TABLET, FILM COATED ORAL DAILY
Status: ON HOLD | DISCHARGE
Start: 2024-09-27

## 2024-09-27 RX ORDER — METOPROLOL TARTRATE 25 MG/1
25 TABLET, FILM COATED ORAL 2 TIMES DAILY
Status: DISCONTINUED | OUTPATIENT
Start: 2024-09-27 | End: 2024-09-30

## 2024-09-27 RX ORDER — NITROGLYCERIN 0.4 MG/1
0.4 TABLET SUBLINGUAL EVERY 5 MIN PRN
Status: CANCELLED | OUTPATIENT
Start: 2024-09-27

## 2024-09-27 RX ORDER — SENNA AND DOCUSATE SODIUM 50; 8.6 MG/1; MG/1
2 TABLET, FILM COATED ORAL DAILY
Status: CANCELLED | OUTPATIENT
Start: 2024-09-28

## 2024-09-27 RX ORDER — SODIUM CHLORIDE 0.9 % (FLUSH) 0.9 %
5-40 SYRINGE (ML) INJECTION EVERY 12 HOURS SCHEDULED
Status: CANCELLED | OUTPATIENT
Start: 2024-09-27

## 2024-09-27 RX ORDER — CLOPIDOGREL BISULFATE 75 MG/1
75 TABLET ORAL DAILY
Status: ON HOLD | DISCHARGE
Start: 2024-09-27

## 2024-09-27 RX ORDER — CLOPIDOGREL BISULFATE 75 MG/1
75 TABLET ORAL DAILY
Status: DISCONTINUED | OUTPATIENT
Start: 2024-09-28 | End: 2024-10-04 | Stop reason: HOSPADM

## 2024-09-27 RX ORDER — SODIUM CHLORIDE 9 MG/ML
INJECTION, SOLUTION INTRAVENOUS PRN
Status: CANCELLED | OUTPATIENT
Start: 2024-09-27

## 2024-09-27 RX ORDER — INSULIN LISPRO 100 [IU]/ML
0-8 INJECTION, SOLUTION INTRAVENOUS; SUBCUTANEOUS
Status: CANCELLED | OUTPATIENT
Start: 2024-09-27

## 2024-09-27 RX ORDER — SENNA AND DOCUSATE SODIUM 50; 8.6 MG/1; MG/1
2 TABLET, FILM COATED ORAL DAILY
Status: DISCONTINUED | OUTPATIENT
Start: 2024-09-28 | End: 2024-10-04 | Stop reason: HOSPADM

## 2024-09-27 RX ORDER — FAMOTIDINE 20 MG/1
20 TABLET, FILM COATED ORAL DAILY
Status: ON HOLD | DISCHARGE
Start: 2024-09-27

## 2024-09-27 RX ADMIN — INSULIN LISPRO 2 UNITS: 100 INJECTION, SOLUTION INTRAVENOUS; SUBCUTANEOUS at 08:45

## 2024-09-27 RX ADMIN — SODIUM CHLORIDE, PRESERVATIVE FREE 10 ML: 5 INJECTION INTRAVENOUS at 08:07

## 2024-09-27 RX ADMIN — ROPINIROLE HYDROCHLORIDE 0.25 MG: 0.25 TABLET, FILM COATED ORAL at 08:08

## 2024-09-27 RX ADMIN — HEPARIN SODIUM 5000 UNITS: 5000 INJECTION INTRAVENOUS; SUBCUTANEOUS at 17:16

## 2024-09-27 RX ADMIN — SENNOSIDES AND DOCUSATE SODIUM 2 TABLET: 50; 8.6 TABLET ORAL at 08:06

## 2024-09-27 RX ADMIN — HEPARIN SODIUM 5000 UNITS: 5000 INJECTION INTRAVENOUS; SUBCUTANEOUS at 05:26

## 2024-09-27 RX ADMIN — INSULIN LISPRO 2 UNITS: 100 INJECTION, SOLUTION INTRAVENOUS; SUBCUTANEOUS at 19:55

## 2024-09-27 RX ADMIN — METOPROLOL TARTRATE 25 MG: 25 TABLET, FILM COATED ORAL at 19:56

## 2024-09-27 RX ADMIN — SENNOSIDES 17.2 MG: 8.6 TABLET, FILM COATED ORAL at 19:56

## 2024-09-27 RX ADMIN — ISOSORBIDE MONONITRATE 60 MG: 30 TABLET, EXTENDED RELEASE ORAL at 19:56

## 2024-09-27 RX ADMIN — ESTRADIOL 0.5 MG: 1 TABLET ORAL at 19:56

## 2024-09-27 RX ADMIN — AMLODIPINE BESYLATE 5 MG: 5 TABLET ORAL at 19:56

## 2024-09-27 RX ADMIN — METOPROLOL TARTRATE 25 MG: 25 TABLET, FILM COATED ORAL at 08:06

## 2024-09-27 RX ADMIN — ESTRADIOL 0.5 MG: 1 TABLET ORAL at 08:07

## 2024-09-27 RX ADMIN — CLOPIDOGREL BISULFATE 75 MG: 75 TABLET ORAL at 08:07

## 2024-09-27 RX ADMIN — ATORVASTATIN CALCIUM 40 MG: 40 TABLET, FILM COATED ORAL at 19:56

## 2024-09-27 RX ADMIN — ROPINIROLE HYDROCHLORIDE 0.25 MG: 0.25 TABLET, FILM COATED ORAL at 17:16

## 2024-09-27 RX ADMIN — ASPIRIN 81 MG 81 MG: 81 TABLET ORAL at 08:07

## 2024-09-27 RX ADMIN — TORSEMIDE 40 MG: 20 TABLET ORAL at 08:06

## 2024-09-27 RX ADMIN — FAMOTIDINE 20 MG: 20 TABLET, FILM COATED ORAL at 08:07

## 2024-09-27 RX ADMIN — POLYETHYLENE GLYCOL 3350 17 G: 17 POWDER, FOR SOLUTION ORAL at 08:08

## 2024-09-27 RX ADMIN — ISOSORBIDE MONONITRATE 60 MG: 60 TABLET, EXTENDED RELEASE ORAL at 08:07

## 2024-09-27 RX ADMIN — ROPINIROLE HYDROCHLORIDE 0.25 MG: 0.25 TABLET, FILM COATED ORAL at 19:56

## 2024-09-27 RX ADMIN — Medication 6 MG: at 21:05

## 2024-09-27 RX ADMIN — LEVOTHYROXINE SODIUM 125 MCG: 75 TABLET ORAL at 05:25

## 2024-09-27 ASSESSMENT — PAIN SCALES - GENERAL: PAINLEVEL_OUTOF10: 0

## 2024-09-27 NOTE — CARE COORDINATION
0955: Called and spoke with ELENITA Cavazos at [facility], regarding patient's approval for admission. Fax confirming authorization did come through this morning. Macy confirmed that the patient has her LifeVest on and that heparin was ordered as the DVT prophylaxis. She will request transport and update admissions coordinator with time.

## 2024-09-27 NOTE — PROGRESS NOTES
Patient arrived to floor from [facility]'s 4B at 1320, I had to call the unit at 1340 for report and spoke with Hallie for report

## 2024-09-27 NOTE — PROGRESS NOTES
Renal Progress Note    Patient :  Rossana Alcantara; 89 y.o. MRN# [id number]  Location:  0421/0421-02  Attending:  Alee Gallegos MD  Admit Date:  9/19/2024   Hospital Day: 7      Subjective:     Patient seen and examined bedside.  Creatinine is starting to improve.  She states that the torsemide at 40 mg a day may be a little stronger as she did have some urinary urgency post taking the medication.  We will discontinue the torsemide and begin Lasix 40 mg oral daily.  She has taken Lasix in the past without significant issue.  She likely will be discharged today.  Denied any complaints, she is on room air  Denies any shortness of breath ,denies any chest pain   She does have a dry cough.     Lab data shows sodium 135, potassium 4.4 chloride 94 and CO2 26 with BUN 15 creatinine 1.9 which is better than 2.2 yesterday.  Glucose is 168 with white blood cells 5.2, hemoglobin 9 and platelets 195.  Creatinine peaked to 2.3 earlier in the admission, likely secondary to radiocontrast.    History reviewed  Known history of coronary artery disease history of coronary bypass graft, chronic kidney disease baseline 1.1-1.2, hypertension, hypothyroidism, cardiomyopathy ejection fraction 30%.  Came into the hospital with complaints of chest pain and shortness of breath.  Troponins were significantly elevated.  She received contrast on admission for CT PE and CT abdomen.  Was negative for PE.  She is underwent cardiac cath which showed occluded RCA graft with significant distal disease.  Rest grafts were patent.  Creatinine from baseline of 1.2 went up to 2.3.  Nephrology consulted for acute kidney injury.      Outpatient Medications:     Medications Prior to Admission: allopurinol (ZYLOPRIM) 300 MG tablet, Take 1 tablet by mouth daily  aspirin 81 MG tablet, Take 1 tablet by mouth daily  Coenzyme Q-10 100 MG CAPS, Take 1 tablet by mouth daily.  Multiple Vitamins-Minerals (THERAPEUTIC MULTIVITAMIN-MINERALS) tablet, Take 1

## 2024-09-27 NOTE — PLAN OF CARE
Problem: Discharge Planning  Goal: Discharge to home or other facility with appropriate resources  Outcome: Progressing  Flowsheets (Taken 9/26/2024 2000)  Discharge to home or other facility with appropriate resources: Identify barriers to discharge with patient and caregiver     Problem: Pain  Goal: Verbalizes/displays adequate comfort level or baseline comfort level  Outcome: Progressing     Problem: Skin/Tissue Integrity  Goal: Absence of new skin breakdown  Description: 1.  Monitor for areas of redness and/or skin breakdown  2.  Assess vascular access sites hourly  3.  Every 4-6 hours minimum:  Change oxygen saturation probe site  4.  Every 4-6 hours:  If on nasal continuous positive airway pressure, respiratory therapy assess nares and determine need for appliance change or resting period.  Outcome: Progressing     Problem: Safety - Adult  Goal: Free from fall injury  Outcome: Progressing     Problem: ABCDS Injury Assessment  Goal: Absence of physical injury  Outcome: Progressing     Problem: Chronic Conditions and Co-morbidities  Goal: Patient's chronic conditions and co-morbidity symptoms are monitored and maintained or improved  Outcome: Progressing  Flowsheets (Taken 9/26/2024 2000)  Care Plan - Patient's Chronic Conditions and Co-Morbidity Symptoms are Monitored and Maintained or Improved: Monitor and assess patient's chronic conditions and comorbid symptoms for stability, deterioration, or improvement

## 2024-09-27 NOTE — CONSULTS
IN-PATIENT SERVICE  [facility] Internal Medicine    CONSULTATION / HISTORY AND PHYSICAL EXAMINATION            Date:   9/27/2024  Patient name:  Rossana Alcantara  Date of admission:  9/27/2024  1:30 PM  MRN:   [id number]  Account:  [id number]  YOB: 1930  PCP:    Modesto Ayala MD  Room:   [address] [place] [address]  Code Status:    DNR-CCA    Physician Requesting Consult: Shannan Coe MD    Reason for Consult:  medical management    Chief Complaint:   Medical comanagement    History Obtained From:     Patient medical record nursing staff    History of Present Illness:   Patient, has past medical history multiple medical problem which include coronary artery disease status post CABG back in 2014, ischemic cardiomyopathy, chronic kidney disease, paroxysmal A-fib not on anticoagulation due to anemia  Patient presented to [facility] with chest pain, diagnosed with NSTEMI, underwent cardiac cath, suggestive of diffuse disease, patient was managed conservatively due to her advanced age  Treated with iron tablets for anemia  Patient also evaluated by nephrologist with CKD  Patient feeling better today  No complaints of chest pain, shortness of breath, abdominal pain  Creatinine downtrending from 2.5-1.9  Patient is on dual antiplatelet  Only complaint patient is having his constipation  Past Medical History:     Past Medical History:   Diagnosis Date    Accelerating angina (HCC)     Arthritis     CAD (coronary artery disease) 5*9/14    cabg    Carotid bruit     bilateral     DM (diabetes mellitus) (HCC)     HTN (hypertension)     Hypothyroid     Mitral regurgitation     mild     Paroxysmal A-fib (HCC)     Not on coumain per pt's wishes     Pericarditis     2009        Past Surgical History:     Past Surgical History:   Procedure Laterality Date    BREAST LUMPECTOMY      CARDIAC CATHETERIZATION      2009, non critical at that time    CARDIAC PROCEDURE N/A 9/20/2024    Left heart cath /

## 2024-09-27 NOTE — PROGRESS NOTES
ACUTE INPATIENT REHABILITATION ADMISSION  [facility]    Patient Name: Rossana Alcantara  MRN: [id number]   Date of Admit: 9/27/2024  Time of Arrival: 1320    Patient admitted to the Acute Inpatient Rehabilitation Unit via Stretcher    Patient oriented to room, unit and fall prevention safety measures.  Education provided on the rehabilitation routine and therapy schedules.    Drug / Medication Regimen Review   Admitting medication orders compared with acute stay medications; home medication list reviewed with patient/family.      Medication Issues Identified ? No If Yes, Check All That Apply   []  Allergy to medication   []  Drug interactions (drug/drug, drug/food, drug/disease interactions)   []  Duplicate drug   []  Omission (drug missing from prescribed regimen)   []  Non adherence   []  Adverse reaction   []  Wrong patient, drug, dose, route, time error   []  Ineffective drug therapy       High-Risk Drug Classes: Use and Indication   Check if the patient is taking any medications by pharmacological classification If yes, check if there is an indication noted for all meds in the drug class   Antipsychotic No If yes: indication noted?  [] If no indication noted, follow up with provider for order clarification   Anticoagulant Yes If yes: indication noted?  []    Antibiotic No If yes: indication noted?  []    Opioid No If yes: indication noted?  []    Antiplatelet Yes If yes: indication noted?  []    Hypoglycemic (Including Insulin) Yes If yes: indication noted?  []      Attending Physical Medicine & Rehabilitation (PM&R) Admitting Order Review   Admission orders reviewed with Acute Inpatient Rehabilitation Attending PM&R Physician: Shannan Coe MD     Skin Assessment   Skin assessment performed by two nurses: all active alterations in skin integrity, wounds, lines, drains and airways assessed, measured, recorded and reconciled. Refer to LDA avatar and LDA flowsheet for additional information.    Nurse    Transfusions No   Dialysis No  If Yes, Check All That Apply   []Hemodialysis   []Peritoneal Dialysis   IV Access No  If Yes, Check All That Apply   []Peripheral   []Midline   [](PICC, tunneled, port)       Admission folder with the following documents provided to patient/responsible party:   1. \"[facility] Individualized Disclosure Statement\"  2. \"Data Collection Information Summary for Patients in Inpatient Rehabilitation Facilities\"  3. \"Privacy Act Statement - Health Care Records\"    Care plan was created with patient/responsible party input and goals were agreed upon.      Please refer to the admission navigator for further information.

## 2024-09-27 NOTE — DISCHARGE INSTR - COC
Continuity of Care Form    Patient Name: Rossana Alcantara   :  1930  MRN:  [id number]    Admit date:  2024  Discharge date:2024      Code Status Order: DNR-CCA   Advance Directives:   Advance Care Flowsheet Documentation             Admitting Physician:  No admitting provider for patient encounter.  PCP: Modesto Ayala MD    Discharging Nurse: Hallie BARROS  Discharging Hospital Unit/Room#: 0421/0421-02  Discharging Unit Phone Number: [phone]    Emergency Contact:   Extended Emergency Contact Information  Primary Emergency Contact: Jeanna Carbone  Home Phone: [phone]  Relation: Child  Secondary Emergency Contact: Thomas Alcantara  Home Phone: [phone]  Mobile Phone: [phone]  Relation: Child    Past Surgical History:  Past Surgical History:   Procedure Laterality Date    BREAST LUMPECTOMY      CARDIAC CATHETERIZATION      , non critical at that time    CHOLECYSTECTOMY      CORONARY ARTERY BYPASS GRAFT  2014    CABG X 6    HYSTERECTOMY (CERVIX STATUS UNKNOWN)      SHOULDER SURGERY      TONSILLECTOMY         Immunization History:   Immunization History   Administered Date(s) Administered    COVID-19, MODERNA Bivalent, (age 12y+), IM, 50 mcg/0.5 mL 2022    COVID-19, MODERNA, (age 12y+), IM, 50mcg/0.5mL 10/05/2023       Active Problems:  Patient Active Problem List   Diagnosis Code    CAD (coronary artery disease) I25.10    Angina pectoris (HCC) I20.9    HTN (hypertension) I10    Left lower lobe pneumonia J18.9    Pneumonia J18.9    Hypertension, essential I10    Hypothyroidism E03.9    ASCVD (arteriosclerotic cardiovascular disease) I25.10    Chest pain R07.9    NSTEMI (non-ST elevated myocardial infarction) ([facility]) I21.4    Acute on chronic congestive heart failure (HCC) I50.9    Hyponatremia E87.1    Acute on chronic anemia D64.9    Iron deficiency E61.1    Hyperlipidemia E78.5    Acute hypoxic respiratory failure J96.01    Paroxysmal A-fib ([facility]) I48.0    AMADO (acute kidney

## 2024-09-27 NOTE — PROGRESS NOTES
[facility]  Internal Medicine Teaching Residency Program  Inpatient Daily Progress Note  ______________________________________________________________________________    Patient: Rossana Alcantara  YOB: 1930   MRN:[id number]    Acct: [id number]     Room: [facility]/0421-02  Admit date: 9/19/2024  Today's date: 09/27/24  Number of days in the hospital: 7    SUBJECTIVE   Admitting Diagnosis: NSTEMI (non-ST elevated myocardial infarction) ([facility])  CC: chest pain     ICU transfer / step down     Pt examined at bedside. Chart & results reviewed.   Generally well, no active concerns or symptoms   Afebrile and vitals stable - saturating 95 % on room air     No further episodes of chest pains, breathing also much better. E&D fine. No N&V or abdo pain. No further dizziness  .  Wearing LifeVest, awaiting discharge to [place] acute rehab, [id number] was done yesterday by Dr. Burrell and she was accepted.  Diuretics reconciled by nephrology currently on p.o. Demadex 40 daily  Patient to be discharged today to [facility] acute re[facility]      ROS:  Constitutional:  negative for chills, fevers, sweats  Respiratory:  negative for cough, dyspnea on exertion, hemoptysis, wheezing  Cardiovascular:  negative for chest pain, chest pressure/discomfort, lower extremity edema, palpitations  Gastrointestinal:  negative for abdominal pain, constipation, diarrhea, nausea, vomiting  Neurological:  negative for dizziness, headache  BRIEF HISTORY   Rossana Alcantara is 89-year-old female whose history includes CAD s/p CABG in 2014, mitral regurgitation, LBBB, hypertension, hyperlipidemia, CHF, hypothyroidism and paroxysmal AF not on anticoagulation.  She woke up on Thursday morning, 9/19/2024, and was feeling fatigued throughout the day.  She started to experience extreme tiredness around bedtime.  As soon as she laid down she started to have pressure type severe central chest pain followed by  clubbing  Neurological/Psychiatric: The patient's general behavior, level of consciousness, thought content and emotional status is normal.        Medications:  Scheduled Medications:    estradiol  0.5 mg Oral BID    sennosides-docusate sodium  2 tablet Oral Daily    polyethylene glycol  17 g Oral Daily    amLODIPine  5 mg Oral Nightly    metoprolol tartrate  25 mg Oral BID    torsemide  40 mg Oral Daily    heparin (porcine)  5,000 Units SubCUTAneous 3 times per day    isosorbide mononitrate  60 mg Oral BID    famotidine  20 mg Oral Daily    rOPINIRole  0.25 mg Oral TID    insulin lispro  0-8 Units SubCUTAneous 4x Daily AC & HS    levothyroxine  125 mcg Oral Daily    sodium chloride flush  5-40 mL IntraVENous 2 times per day    melatonin  5 mg Oral Nightly    sodium chloride flush  5-40 mL IntraVENous 2 times per day    atorvastatin  40 mg Oral Daily    clopidogrel  75 mg Oral Daily    aspirin  81 mg Oral Daily     Continuous Infusions:    sodium chloride      dextrose      sodium chloride       PRN MedicationsoxyCODONE-acetaminophen, 1 tablet, Q6H PRN  nitroGLYCERIN, 0.4 mg, Q5 Min PRN  hydrALAZINE, 10 mg, Q6H PRN  albuterol sulfate HFA, 2 puff, Q6H PRN  sodium chloride flush, 5-40 mL, PRN  sodium chloride, , PRN  ondansetron, 4 mg, Q8H PRN   Or  ondansetron, 4 mg, Q6H PRN  glucose, 4 tablet, PRN  dextrose bolus, 125 mL, PRN   Or  dextrose bolus, 250 mL, PRN  glucagon (rDNA), 1 mg, PRN  dextrose, , Continuous PRN  sodium chloride flush, 5-40 mL, PRN  sodium chloride, , PRN  acetaminophen, 650 mg, Q4H PRN        Diagnostic Labs:  CBC:   Recent Labs     09/25/24  1015 09/26/24  0254 09/27/24  0355   WBC 5.9 4.6 5.2   RBC 2.32* 2.24* 2.29*   HGB 9.1* 8.8* 9.0*   HCT 27.6* 26.9* 27.5*   .0* 120.1* 120.1*   RDW 15.0* 14.6* 14.6*    158 195     BMP:   Recent Labs     09/25/24  1015 09/26/24  0254 09/27/24  0355   * 135* 135*   K 4.4 4.1 4.4   CL 94* 95* 94*   CO2 26 26 26   BUN 40* 45* 50*

## 2024-09-27 NOTE — CARE COORDINATION
ARU CASE MANAGEMENT NOTE:    Admission Date:  9/27/2024 Rossana Alcantara is a 89 y.o.  female    Admitted for : Debility [R53.81]    Spoke with patient regarding discharge plan: Patient is alert and oriented x4. Patient son and daughter in law in room. Goal is to go home. Family will be available to assist as need. Both Son and daughter in law are retired. Patient has used [facility] in the past . Called and spoke with Odette, she will call back to let us know if they can accept this patient    Outside appointments while in ARU: none while in Rehab    DME: Patient [facility]    Will continue to follow for additional discharge needs.    Electronically signed by Maria F Freyer, RN on 9/27/2024 at 3:12 PM

## 2024-09-27 NOTE — CARE COORDINATION
Transitional Planning  [phone] yesterday Dr Burrell was able to get P2P overturned. Lifevest must be placed prior to transfer. They are awaiting final approval # prior to admit.      7:37  Updated pt regarding above she states her son and dtr hiro will be up this am. She has her life vest on.    Received checking to see if pt is ready for discharge  PS the on call Internal Medicine Senior resident asking if pt ready will need discharge readmit and [facility] can accept  READ 10:11 await orders    10:24  Faxed will call orders for transportation to [facility]    11:29  Spoke with Marcelina [facility] scheduled for 1pm pickup.  Updated RN and pt with time.  Called Britney with [facility] updated  NURSING REPORT TO BE CALLED TO [phone]    Discharge Report    [facility]  Clinical Case Management Department  Written by: Eula Piper RN    Patient Name: Rossana Alcantara  Attending Provider: Alee Gallegos MD  Admit Date: 2024 10:57 PM  MRN: [id number]  Account: [id number]                     : 1930  Discharge Date: 2024      Disposition: [facility]    Eula Piper RN

## 2024-09-27 NOTE — CARE COORDINATION
[facility] Acute Inpatient Rehabilitation  Case Management Assessment  Initial Evaluation    Date/Time of Evaluation: 9/27/2024 2:51 PM  Assessment Completed by: Maria F Freyer, RN    If patient is discharged prior to next notation, then this note serves as note for discharge by case management.    Patient Name: Rossana Alcantara                     Date / Time: 9/27/2024  1:30 PM  YOB: 1930  Diagnosis: Debility [R53.81]                     Patient Admission Status: REHAB IP   Readmission Risk (Low < 19, Mod (19-27), High > 27): Readmission Risk Score: 18      Current PCP: Modesto Ayala MD  PCP verified by CM? Yes  Chart Reviewed: Yes      History Provided by: Patient  Patient Orientation: Alert and Oriented    Patient Cognition: Alert    Advance Directives:    Code Status: DNR-CCA   Patient's Primary Decision Maker is: Legal Next of Kin/ Son      Current Services Prior to Admission:  Current Financial resources: Medicare  Current community resources: None  Current services prior to admission: None  Type of Home Care services:  None  Current Home DME:  walker  Do you have a wheelchair ramp/Plans to build? No  Handicap Placard: Not Needed At This Time    Discharge Planning:  Patient lives with: Alone   Type of Home: House  Primary Care Giver:    Marital Status:   Patient Support Systems include: Family Members   Family can provide assistance at DC: Yes  Does patient have 24 hour assistance at home:  No, has a son and daughter in law who are available if patient needs 24/7 care  Is patient agreeable to VNS or Outpatient therapy Yes  [place] of choice provided: Yes  List of Home Care Agencies/Outpatient therapy provided: Yes  VNS/Outpatient therapy chosen: Yes, [facility]    Does patient go to outpatient dialysis: No  If yes, location and chair time: no    Would you like Case Management to discuss the discharge plan with any other family members/significant others, and if so, who?  anticoagulation ischemic cardiomyopathy as well as hypertension hyperlipidemia and type 2 diabetes-continue aspirin Plavix statin Imdur and beta-blocker ejection fraction 3035% ordered LifeVest on discharge, GDMT, medical manage. Family would like [facility] called/ will refer to TEAM for recommendations.       Acute Inpatient Rehabilitation Whiteboard Template: Enter Information then Copy and Paste into Whiteboard  Dispo: Home  / HHC vs OP: TBD/ family would like [facility]  / Placard: Has  / PHQ: 0  / F/U Appts While In ARU: none  / Pharmacy: [id number] - [place], [place] - [address] -       [id number] - [place] [place] - [address] - P [phone] - F [phone]  [address] [address]  Phone: [phone] Fax: [phone]    FOR MAIL IN ONLY_  [facility] - [place] - [facility] - P [phone] - F [phone]  [facility]  [place] [address]  Phone: [phone] Fax: [phone]      Patient Health Questionnaire-9 (PHQ-2 to 9)   Over the last 2 weeks, how often have you been bothered by any of the following problems?    1. Little Interest or pleasure in doing things?   Never or 1 Day - Score 0    2. Feeling down, depressed or hopeless?   Never or 1 Day - Score 0    3. Trouble falling or staying asleep, or sleeping too much?   Never or 1 Day - Score 0    4. Feeling tired or having little energy?   Never or 1 Day - Score 0    5. Poor appetite or overeating?   Never or 1 Day - Score 0    6. Feeling bad about yourself-or that you are a failure or have let yourself or your family down?   Never or 1 Day - Score 0    7. Trouble concentrating on things, such as reading the newspaper or watching television?    Never or 1 Day - Score 0    8. Moving or speaking so slowly that other people could have noticed? Or the opposite-being so fidgety or restless that you have been moving around a lot more than usual?  Never or 1 Day -

## 2024-09-27 NOTE — FLOWSHEET NOTE
Patient transported off unit via stretcher. IV removed. Belongings gathered and sent with patient. Report given to BUZZ Mcduffie at [facility].

## 2024-09-27 NOTE — PROGRESS NOTES
Physical Therapy  Facility/Department: [address] STEPDOWN  Physical Therapy Treatment Note    Name: Rossana Alcantara  : 1930  MRN: [id number]  Date of Service: 2024    Discharge Recommendations:  Patient would benefit from continued therapy after discharge   PT Equipment Recommendations  Equipment Needed: No      Patient Diagnosis(es): The primary encounter diagnosis was Acute on chronic congestive heart failure, unspecified heart failure type ([facility]). Diagnoses of Troponin level elevated and NSTEMI (non-ST elevated myocardial infarction) ([facility]) were also pertinent to this visit.  Past Medical History:  has a past medical history of Accelerating angina ([facility]), Arthritis, CAD (coronary artery disease), Carotid bruit, DM (diabetes mellitus) ([facility]), HTN (hypertension), Hypothyroid, Mitral regurgitation, Paroxysmal A-fib ([facility]), and Pericarditis.  Past Surgical History:  has a past surgical history that includes Cardiac catheterization; Cholecystectomy; Hysterectomy; shoulder surgery; Tonsillectomy; Breast lumpectomy; Coronary artery bypass graft (2014); and Cardiac procedure (N/A, 2024).    Assessment  Body Structures, Functions, Activity Limitations Requiring Skilled Therapeutic Intervention: Decreased functional mobility ;Decreased endurance;Decreased balance;Decreased strength;Decreased coordination  Assessment: Pt with mobility deficits requiring CGA to ambulate 40ft x2 ft with a RW.  Prior to this admission, pt was able to ambulate in the community with no AD.   Pt would benefit from additional PT upon discharge to assist in return to independent PLOF.  Pt will require 24 hour assistance with mobility upon discharge.  Therapy Prognosis: Good  Requires PT Follow-Up: Yes  Activity Tolerance  Activity Tolerance: Patient tolerated treatment well;Patient limited by endurance    Plan  Physical Therapy Plan  General Plan:  (5-6x/wk)  Current Treatment Recommendations: Strengthening, ROM, Balance training,  decreased gait speed, forward flexed trunk, no true LOB.  Gait Deviations: Slow Jordyn;Decreased step length  Distance: 40ft x2  Comments: decreased endurance  More Ambulation?: No  Stairs/Curb  Stairs?: No     Balance  Posture: Good  Sitting - Static: Good  Sitting - Dynamic: Good  Standing - Static: Fair;+  Standing - Dynamic: Fair  Comments: standing balance assessed while using a RW.  Exercise Treatment:   Seated LE exercise program: Long Arc Quads, hip abduction/adduction, heel/toe raises, and marches. Reps: 20x   Standing exercise program: Heel/toe raises, hip flexion and hamstring curls. Reps: 10x       AM-PAC - Mobility    AM-PAC Basic Mobility - Inpatient   How much help is needed turning from your back to your side while in a flat bed without using bedrails?: None  How much help is needed moving from lying on your back to sitting on the side of a flat bed without using bedrails?: A Little  How much help is needed moving to and from a bed to a chair?: A Little  How much help is needed standing up from a chair using your arms?: A Little  How much help is needed walking in hospital room?: A Little  How much help is needed climbing 3-5 steps with a railing?: A Lot  AM-PAC Inpatient Mobility Raw Score : 18  AM-PAC Inpatient T-Scale Score : 43.63  Mobility Inpatient CMS 0-100% Score: 46.58  Mobility Inpatient CMS G-Code Modifier : CK    Goals  Short Term Goals  Time Frame for Short Term Goals: 14 visits  Short Term Goal 1: Pt will perform sit<>stand transfer with supervision.  Short Term Goal 2: Pt will ambulate 300 feet with least restrictive or no AD and supervision.  Short Term Goal 3: Pt will demonstrate good- dynamic standing balance to decrease fall risk.  Short Term Goal 4: Pt will negotiate 2 stairs with bilateral handrails and SBA to allow the pt to enter prior living arrangements.  Short Term Goal 5: Pt will tolerate a 35 minute therapy session to promote increased endurance.       Education  Patient

## 2024-09-28 LAB
ALBUMIN SERPL-MCNC: 3.3 G/DL (ref 3.5–5.2)
ALP SERPL-CCNC: 56 U/L (ref 35–104)
ALT SERPL-CCNC: 17 U/L (ref 5–33)
ANION GAP SERPL CALCULATED.3IONS-SCNC: 14 MMOL/L (ref 9–17)
AST SERPL-CCNC: 21 U/L
BASOPHILS # BLD: 0 K/UL (ref 0–0.2)
BASOPHILS NFR BLD: 0 % (ref 0–2)
BILIRUB DIRECT SERPL-MCNC: 0.1 MG/DL
BILIRUB INDIRECT SERPL-MCNC: 0.3 MG/DL (ref 0–1)
BILIRUB SERPL-MCNC: 0.4 MG/DL (ref 0.3–1.2)
BUN SERPL-MCNC: 60 MG/DL (ref 8–23)
CALCIUM SERPL-MCNC: 9.2 MG/DL (ref 8.6–10.4)
CHLORIDE SERPL-SCNC: 91 MMOL/L (ref 98–107)
CO2 SERPL-SCNC: 28 MMOL/L (ref 20–31)
CREAT SERPL-MCNC: 2.2 MG/DL (ref 0.5–0.9)
EOSINOPHIL # BLD: 0.33 K/UL (ref 0–0.4)
EOSINOPHILS RELATIVE PERCENT: 6 % (ref 0–4)
ERYTHROCYTE [DISTWIDTH] IN BLOOD BY AUTOMATED COUNT: 15.3 % (ref 11.5–14.9)
GFR, ESTIMATED: 20 ML/MIN/1.73M2
GLUCOSE BLD-MCNC: 148 MG/DL (ref 65–105)
GLUCOSE BLD-MCNC: 163 MG/DL (ref 65–105)
GLUCOSE BLD-MCNC: 239 MG/DL (ref 65–105)
GLUCOSE BLD-MCNC: 92 MG/DL (ref 65–105)
GLUCOSE SERPL-MCNC: 148 MG/DL (ref 70–99)
HCT VFR BLD AUTO: 28.8 % (ref 36–46)
HGB BLD-MCNC: 9.6 G/DL (ref 12–16)
LYMPHOCYTES NFR BLD: 2.36 K/UL (ref 1–4.8)
LYMPHOCYTES RELATIVE PERCENT: 43 % (ref 24–44)
MAGNESIUM SERPL-MCNC: 1.7 MG/DL (ref 1.6–2.6)
MCH RBC QN AUTO: 40.6 PG (ref 26–34)
MCHC RBC AUTO-ENTMCNC: 33.2 G/DL (ref 31–37)
MCV RBC AUTO: 122.2 FL (ref 80–100)
MONOCYTES NFR BLD: 0.55 K/UL (ref 0.1–1.3)
MONOCYTES NFR BLD: 10 % (ref 1–7)
MORPHOLOGY: ABNORMAL
NEUTROPHILS NFR BLD: 41 % (ref 36–66)
NEUTS SEG NFR BLD: 2.26 K/UL (ref 1.3–9.1)
PLATELET # BLD AUTO: 219 K/UL (ref 150–450)
PMV BLD AUTO: 9.7 FL (ref 6–12)
POTASSIUM SERPL-SCNC: 4.9 MMOL/L (ref 3.7–5.3)
PROT SERPL-MCNC: 7.9 G/DL (ref 6.4–8.3)
RBC # BLD AUTO: 2.36 M/UL (ref 4–5.2)
SODIUM SERPL-SCNC: 133 MMOL/L (ref 135–144)
WBC OTHER # BLD: 5.5 K/UL (ref 3.5–11)

## 2024-09-28 PROCEDURE — 1180000000 HC REHAB R&B

## 2024-09-28 PROCEDURE — 97530 THERAPEUTIC ACTIVITIES: CPT

## 2024-09-28 PROCEDURE — 82947 ASSAY GLUCOSE BLOOD QUANT: CPT

## 2024-09-28 PROCEDURE — 97116 GAIT TRAINING THERAPY: CPT

## 2024-09-28 PROCEDURE — 97110 THERAPEUTIC EXERCISES: CPT

## 2024-09-28 PROCEDURE — 99222 1ST HOSP IP/OBS MODERATE 55: CPT | Performed by: STUDENT IN AN ORGANIZED HEALTH CARE EDUCATION/TRAINING PROGRAM

## 2024-09-28 PROCEDURE — 80076 HEPATIC FUNCTION PANEL: CPT

## 2024-09-28 PROCEDURE — 36415 COLL VENOUS BLD VENIPUNCTURE: CPT

## 2024-09-28 PROCEDURE — 80048 BASIC METABOLIC PNL TOTAL CA: CPT

## 2024-09-28 PROCEDURE — 97535 SELF CARE MNGMENT TRAINING: CPT

## 2024-09-28 PROCEDURE — 6360000002 HC RX W HCPCS

## 2024-09-28 PROCEDURE — 85025 COMPLETE CBC W/AUTO DIFF WBC: CPT

## 2024-09-28 PROCEDURE — 83735 ASSAY OF MAGNESIUM: CPT

## 2024-09-28 PROCEDURE — 97166 OT EVAL MOD COMPLEX 45 MIN: CPT

## 2024-09-28 PROCEDURE — 6370000000 HC RX 637 (ALT 250 FOR IP)

## 2024-09-28 PROCEDURE — 97162 PT EVAL MOD COMPLEX 30 MIN: CPT

## 2024-09-28 PROCEDURE — 99232 SBSQ HOSP IP/OBS MODERATE 35: CPT | Performed by: INTERNAL MEDICINE

## 2024-09-28 RX ORDER — FUROSEMIDE 40 MG
40 TABLET ORAL DAILY
Status: DISCONTINUED | OUTPATIENT
Start: 2024-09-28 | End: 2024-10-04 | Stop reason: HOSPADM

## 2024-09-28 RX ADMIN — HEPARIN SODIUM 5000 UNITS: 5000 INJECTION INTRAVENOUS; SUBCUTANEOUS at 00:58

## 2024-09-28 RX ADMIN — LEVOTHYROXINE SODIUM 125 MCG: 125 TABLET ORAL at 06:45

## 2024-09-28 RX ADMIN — ESTRADIOL 0.5 MG: 1 TABLET ORAL at 20:22

## 2024-09-28 RX ADMIN — ISOSORBIDE MONONITRATE 60 MG: 30 TABLET, EXTENDED RELEASE ORAL at 08:34

## 2024-09-28 RX ADMIN — HEPARIN SODIUM 5000 UNITS: 5000 INJECTION INTRAVENOUS; SUBCUTANEOUS at 08:34

## 2024-09-28 RX ADMIN — ESTRADIOL 0.5 MG: 1 TABLET ORAL at 08:35

## 2024-09-28 RX ADMIN — POLYETHYLENE GLYCOL 3350 17 G: 17 POWDER, FOR SOLUTION ORAL at 08:34

## 2024-09-28 RX ADMIN — Medication 6 MG: at 20:21

## 2024-09-28 RX ADMIN — ROPINIROLE HYDROCHLORIDE 0.25 MG: 0.25 TABLET, FILM COATED ORAL at 08:34

## 2024-09-28 RX ADMIN — ROPINIROLE HYDROCHLORIDE 0.25 MG: 0.25 TABLET, FILM COATED ORAL at 14:42

## 2024-09-28 RX ADMIN — METOPROLOL TARTRATE 25 MG: 25 TABLET, FILM COATED ORAL at 08:34

## 2024-09-28 RX ADMIN — HEPARIN SODIUM 5000 UNITS: 5000 INJECTION INTRAVENOUS; SUBCUTANEOUS at 17:00

## 2024-09-28 RX ADMIN — CLOPIDOGREL BISULFATE 75 MG: 75 TABLET ORAL at 08:34

## 2024-09-28 RX ADMIN — ASPIRIN 81 MG 81 MG: 81 TABLET ORAL at 08:34

## 2024-09-28 RX ADMIN — ATORVASTATIN CALCIUM 40 MG: 40 TABLET, FILM COATED ORAL at 20:21

## 2024-09-28 RX ADMIN — METOPROLOL TARTRATE 25 MG: 25 TABLET, FILM COATED ORAL at 20:21

## 2024-09-28 RX ADMIN — AMLODIPINE BESYLATE 5 MG: 5 TABLET ORAL at 20:21

## 2024-09-28 RX ADMIN — ROPINIROLE HYDROCHLORIDE 0.25 MG: 0.25 TABLET, FILM COATED ORAL at 20:22

## 2024-09-28 RX ADMIN — ISOSORBIDE MONONITRATE 60 MG: 30 TABLET, EXTENDED RELEASE ORAL at 20:21

## 2024-09-28 RX ADMIN — FAMOTIDINE 20 MG: 20 TABLET, FILM COATED ORAL at 08:34

## 2024-09-28 RX ADMIN — INSULIN LISPRO 2 UNITS: 100 INJECTION, SOLUTION INTRAVENOUS; SUBCUTANEOUS at 17:00

## 2024-09-28 RX ADMIN — SENNOSIDES AND DOCUSATE SODIUM 2 TABLET: 50; 8.6 TABLET ORAL at 08:34

## 2024-09-28 ASSESSMENT — PAIN SCALES - GENERAL: PAINLEVEL_OUTOF10: 0

## 2024-09-28 NOTE — PLAN OF CARE
Problem: Discharge Planning  Goal: Discharge to home or other facility with appropriate resources  9/28/2024 1019 by Khalida Beaulieu LPN  Outcome: Progressing     Problem: Safety - Adult  Goal: Free from fall injury  9/28/2024 1019 by Khalida Beaulieu LPN  Outcome: Progressing     Problem: Chronic Conditions and Co-morbidities  Goal: Patient's chronic conditions and co-morbidity symptoms are monitored and maintained or improved  9/28/2024 1019 by Khalida Beaulieu LPN  Outcome: Progressing     Problem: Skin/Tissue Integrity  Goal: Absence of new skin breakdown  Description: 1.  Monitor for areas of redness and/or skin breakdown  2.  Assess vascular access sites hourly  3.  Every 4-6 hours minimum:  Change oxygen saturation probe site  4.  Every 4-6 hours:  If on nasal continuous positive airway pressure, respiratory therapy assess nares and determine need for appliance change or resting period.  9/28/2024 1019 by Khalida Beaulieu LPN  Outcome: Progressing     Problem: ABCDS Injury Assessment  Goal: Absence of physical injury  9/28/2024 1019 by Khalida Beaulieu LPN  Outcome: Progressing     Problem: Cardiovascular - Adult  Goal: Maintains optimal cardiac output and hemodynamic stability  9/28/2024 1019 by Khalida Beaulieu LPN  Outcome: Progressing     Problem: Cardiovascular - Adult  Goal: Absence of cardiac dysrhythmias or at baseline  9/28/2024 1019 by Khalida Beaulieu LPN  Outcome: Progressing     Problem: Musculoskeletal - Adult  Goal: Return mobility to safest level of function  9/28/2024 1019 by Khalida Beaulieu LPN  Outcome: Progressing     Problem: Gastrointestinal - Adult  Goal: Maintains adequate nutritional intake  9/28/2024 1019 by Khalida Beaulieu LPN  Outcome: Progressing     Problem: Genitourinary - Adult  Goal: Absence of urinary retention  9/28/2024 1019 by Khalida Beaulieu LPN  Outcome: Progressing     Problem: Metabolic/Fluid and Electrolytes - Adult  Goal:

## 2024-09-28 NOTE — PROGRESS NOTES
[facility]   Acute Rehabilitation Occupational Therapy Evaluation     Date: 24  Patient Name: Rossana Alcantara       Room: 2606/2606-01  MRN: [id number]  Account: [id number]   : 1930  (89 y.o.) Gender: female       Referring Practitioner: Gustavo Burrell MD  Diagnosis: Debility secondary to NSTEMI  Additional Pertinent Hx: Ms. Rossana Alcantara is a 89 y.o.  female who was admitted to [facility] on 2024 with Chest Pain and Shortness of Breath. 89-year-old female with history coronary disease status post CABG , mitral regurgitation, left bundle branch block, hypertension, hyperlipidemia, CHF ejection fraction 40 to 45%, hypothyroidism, proximal A-fib not on anticoagulation presenting with chest pain and fatigue. She was found to be tachypneic diaphoretic EKG showed left bundle branch block old concern for scarboss criteria patient also started on Zosyn due to meeting SIRS criteria. Cardiology NSTEMI status post cardiac cath 2024 occluded SVG to RCA severe disease RCA/PSI deferred due to advanced age and multiple comorbidities acute hypoxic respite failure due to acute on chronic heart failure with reduced ejection fraction, paroxysmal A-fib not on anticoagulation ischemic cardiomyopathy as well as hypertension hyperlipidemia and type 2 diabetes-continue aspirin Plavix statin Imdur and beta-blocker ejection fraction 3035% ordered LifeVest on discharge, GDMT, medical manage     Internal medicine as above troponins uptrending no more chest pain high BNP low ejection fraction pulmonary edema and rising creatinine likely explains rising troponin, acute on chronic CHF with ischemic cardiomyopathy cardiology starting low-dose IV Lasix nephro advised reduced Lasix 80 mg IV every other day, AMADO/CKD contrast versus cardiorenal ultrasound hypoechoic bilateral renal cortex suggestive of underlying parenchymal disease renal follow-up  -Creatinine increased 1.2-2.5, paroxysmal

## 2024-09-28 NOTE — PROGRESS NOTES
Comprehensive Nutrition Assessment    Type and Reason for Visit:  Consult (ARU)    Nutrition Recommendations/Plan:   Will provide 4 carbohydrate choices per tray and add Ensure High Protein once daily  Based on pt's advanced age, Cardiac Restrictions are not appropriate.     Malnutrition Assessment:  Malnutrition Status:  At risk for malnutrition (Comment) (09/28/24 1319)    Context:  Acute Illness     Findings of the 6 clinical characteristics of malnutrition:  Energy Intake:  Mild decrease in energy intake (Comment)  Weight Loss:  1% to 2% over 1 week (possibly due to different scales, decreased fluid retention)     Body Fat Loss:  No significant body fat loss     Muscle Mass Loss:  No significant muscle mass loss    Fluid Accumulation:  No significant fluid accumulation     Strength:  Not Performed    Nutrition Assessment:    Pt was admitted to [facility] on (9/19) and found to have an MI treated conservatively. She ate more than 75% of food provided during that admit. Pt is now admitted to ARU and states she is eating her usual amounts (greater than 50%). Glu 148 noted.    Nutrition Related Findings:    no edema, Labs: Glu 148, Meds: Synthroid, PMH: DM, CKD, CABG 2014 Wound Type: None       Current Nutrition Intake & Therapies:    Average Meal Intake: %     ADULT DIET; Regular; 4 carb choices (60 gm/meal); Low Fat/Low Chol/High Fiber/2 gm Na    Anthropometric Measures:  Height: 165 cm (5' 4.96\")  Ideal Body Weight (IBW): 125 lbs (57 kg)    Admission Body Weight: 65.3 kg (144 lb)  Current Body Weight: 65.3 kg (144 lb), 115.2 % IBW. Weight Source: Bed Scale  Current BMI (kg/m2): 24  Usual Body Weight: 68.9 kg (152 lb)  % Weight Change (Calculated): -5.3                    BMI Categories: Normal Weight (BMI 22.0 to 24.9) age over 65    Estimated Daily Nutrient Needs:  Energy Requirements Based On: Formula  Weight Used for Energy Requirements: Admission  Energy (kcal/day): Sampson x 1.2= [id number]

## 2024-09-28 NOTE — PROGRESS NOTES
Patient is requesting to have the life vest removed as part of her treatment, states it is heavy and uncomfortable.  Family member (son) shared in conversation with writer that  she stated to him that she has changed her code status to DNRCC, why should I have to wear this.  Writer informed family that I would share this information with Dr. Coe to have a discussion with the family.  Family and patient were appreciative.

## 2024-09-28 NOTE — PROGRESS NOTES
IN-PATIENT SERVICE  [facility] Internal Medicine    CONSULTATION / HISTORY AND PHYSICAL EXAMINATION            Date:   9/28/2024  Patient name:  Rossana Alcantara  Date of admission:  9/27/2024  1:30 PM  MRN:   [id number]  Account:  [id number]  YOB: 1930  PCP:    Modesto Ayala MD  Room:   [address] [place] [address]  Code Status:    DNR-CCA    Physician Requesting Consult: Shannan Coe MD    Reason for Consult:  medical management    Chief Complaint:   Medical comanagement    History Obtained From:     Patient medical record nursing staff    History of Present Illness:   Patient, has past medical history multiple medical problem which include coronary artery disease status post CABG back in 2014, ischemic cardiomyopathy, chronic kidney disease, paroxysmal A-fib not on anticoagulation due to anemia  Patient presented to [facility] with chest pain, diagnosed with NSTEMI, underwent cardiac cath, suggestive of diffuse disease, patient was managed conservatively due to her advanced age  Treated with iron tablets for anemia  Patient also evaluated by nephrologist with CKD  Patient feeling better today  No complaints of chest pain, shortness of breath, abdominal pain  Creatinine downtrending from 2.5-1.9  Patient is on dual antiplatelet  Only complaint patient is having his constipation  Past Medical History:     Past Medical History:   Diagnosis Date    Accelerating angina (HCC)     Arthritis     CAD (coronary artery disease) 5*9/14    cabg    Carotid bruit     bilateral     DM (diabetes mellitus) (HCC)     HTN (hypertension)     Hypothyroid     Mitral regurgitation     mild     Paroxysmal A-fib (HCC)     Not on coumain per pt's wishes     Pericarditis     2009        Past Surgical History:     Past Surgical History:   Procedure Laterality Date    BREAST LUMPECTOMY      CARDIAC CATHETERIZATION      2009, non critical at that time    CARDIAC PROCEDURE N/A 9/20/2024    Left heart cath /

## 2024-09-28 NOTE — PLAN OF CARE
Problem: Discharge Planning  Goal: Discharge to home or other facility with appropriate resources  Outcome: Progressing     Problem: Safety - Adult  Goal: Free from fall injury  Outcome: Progressing     Problem: Chronic Conditions and Co-morbidities  Goal: Patient's chronic conditions and co-morbidity symptoms are monitored and maintained or improved  Outcome: Progressing     Problem: Skin/Tissue Integrity  Goal: Absence of new skin breakdown  Description: 1.  Monitor for areas of redness and/or skin breakdown  2.  Assess vascular access sites hourly  3.  Every 4-6 hours minimum:  Change oxygen saturation probe site  4.  Every 4-6 hours:  If on nasal continuous positive airway pressure, respiratory therapy assess nares and determine need for appliance change or resting period.  Outcome: Progressing     Problem: ABCDS Injury Assessment  Goal: Absence of physical injury  Outcome: Progressing  Flowsheets (Taken 9/27/2024 1400 by Reta Fernandez RN)  Absence of Physical Injury: Implement safety measures based on patient assessment     Problem: Cardiovascular - Adult  Goal: Maintains optimal cardiac output and hemodynamic stability  9/27/2024 2235 by Bailey Sotelo RN  Outcome: Progressing  9/27/2024 1544 by Yue Herrera RN  Outcome: Progressing  Goal: Absence of cardiac dysrhythmias or at baseline  9/27/2024 2235 by Bailey Sotelo RN  Outcome: Progressing  9/27/2024 1544 by Yue Herrera RN  Outcome: Progressing     Problem: Cardiovascular - Adult  Goal: Absence of cardiac dysrhythmias or at baseline  9/27/2024 2235 by Bailey Sotelo RN  Outcome: Progressing  9/27/2024 1544 by Yue Herrera RN  Outcome: Progressing     Problem: Musculoskeletal - Adult  Goal: Return mobility to safest level of function  9/27/2024 2235 by Bailey Sotelo RN  Outcome: Progressing  9/27/2024 1544 by Yue Herrera RN  Outcome: Progressing     Problem: Gastrointestinal - Adult  Goal: Maintains adequate nutritional

## 2024-09-28 NOTE — DISCHARGE SUMMARY
[facility]     Department of Internal Medicine - Staff Internal Medicine Teaching Service    INPATIENT DISCHARGE SUMMARY      Patient Identification:  Rossana Alcantara is a 89 y.o. female.  :  1930  MRN: [id number]     Acct: [id number]   PCP: Modesto Ayala MD  Admit Date:  2024  Discharge date and time: 2024  1:10 PM   Attending Provider: No att. providers found                                     ACTIVE DISCHARGE DIAGNOSES     Hospital Problem Lists:  Principal Problem:    NSTEMI (non-ST elevated myocardial infarction) (HCC)  Active Problems:    Acute on chronic congestive heart failure (HCC)    CAD (coronary artery disease)    HTN (hypertension)    Hypertension, essential    Hypothyroidism    Hyponatremia    Acute on chronic anemia    Iron deficiency    Hyperlipidemia    Acute hypoxic respiratory failure    Paroxysmal A-fib (HCC)    AMADO (acute kidney injury) (HCC)    Ischemic cardiomyopathy    Left bundle branch block    Troponin level elevated  Resolved Problems:    * No resolved hospital problems. *      HOSPITAL STAY     Brief Inpatient course:   Rossana Alcantara is a 89 y.o. female who was admitted for the management of NSTEMI (non-ST elevated myocardial infarction) (HCC), presented to the emergency department with chest pain.  She is known to have significant ischemic heart disease with previous CABG.  EKG was known LBBB.  Troponin were uptrending.  She was started on treatment for an STEMI and had a cardiac cath the next day.  Catheter revealed occlusion of the SVG to RCA graft and severe diffuse disease in the RCA as well.  She was discussed in the cath conference and the outcome was medical management with aspirin, Plavix, Lipitor, Imdur and metoprolol.  Imdur was increased to 60 mg twice a day and metoprolol increased to 50 mg twice a day.  She did not have any more chest pain during her admission.    She is known to have congestive heart failure  tablet Take 1 tablet by mouth nightlyNE to [facility]      metoprolol tartrate (LOPRESSOR) 25 MG tablet Take 1 tablet by mouth 2 times dailyNE to [facility]      polyethylene glycol (GLYCOLAX) 17 g packet Take 1 packet by mouth dailyNE to [facility]      sennosides-docusate sodium (SENOKOT-S) 8.6-50 MG tablet Take 2 tablets by mouth dailyNE to [facility]      torsemide 40 MG TABS Take 40 mg by mouth dailyNE to [facility]           CONTINUE these medications which have CHANGED    Details   isosorbide mononitrate (IMDUR) 60 MG extended release tablet Take 1 tablet by mouth in the morning and at bedtimeNE to [facility]      atorvastatin (LIPITOR) 40 MG tablet Take 1 tablet by mouth dailyNE to [facility]      estradiol (ESTRACE) 0.5 MG tablet Take 1 tablet by mouth in the morning and at bedtimeNE to [facility]      nitroGLYCERIN (NITROSTAT) 0.4 MG SL tablet Place 1 tablet under the tongue every 5 minutes as needed for Chest pain up to max of 3 total doses. If no relief after 1 dose, call [email or web address] to [facility]           CONTINUE these medications which have NOT CHANGED    Details   albuterol sulfate HFA (PROVENTIL HFA) 108 (90 Base) MCG/ACT inhaler Inhale 2 puffs into the lungs every 6 hours as needed for Wheezing, Disp-18 g, R-0Normal      allopurinol (ZYLOPRIM) 300 MG tablet Take 1 tablet by mouth dailyHistorical Med      Probiotic Product (ANNIE-BID PROBIOTIC) TABS TAKE 1 TABLET BY MOUTH EVERY DAY , Disp-30 tablet, R-0Normal      colestipol (COLESTID) 1 g tablet Take 1 tablet by mouth 2 times daily, Disp-60 tablet, R-3Normal      aspirin 81 MG tablet Take 1 tablet by mouth dailyHistorical Med      Coenzyme Q-10 100 MG CAPS Take 1 tablet by mouth daily.      Multiple Vitamins-Minerals (THERAPEUTIC MULTIVITAMIN-MINERALS) tablet Take 1 tablet by mouth dailyHistorical Med           STOP taking these medications       levothyroxine (SYNTHROID) 125 MCG tablet Comments:   Reason for Stopping:         metoprolol succinate (TOPROL XL) 25 MG extended release tablet Comments:   Reason for

## 2024-09-28 NOTE — PROGRESS NOTES
Physical Therapy  Facility/Department: [facility] ACUTE REHAB  Rehabilitation Physical Therapy Initial Assessment    NAME: Rossana Alcantara  : 1930 (89 y.o.)  MRN: [id number]  CODE STATUS: DNR-CCA    Date of Service: 24      Past Medical History:   Diagnosis Date    Accelerating angina (HCC)     Arthritis     CAD (coronary artery disease) 5*    cabg    Carotid bruit     bilateral     DM (diabetes mellitus) (HCC)     HTN (hypertension)     Hypothyroid     Mitral regurgitation     mild     Paroxysmal A-fib (HCC)     Not on coumain per pt's wishes     Pericarditis          Past Surgical History:   Procedure Laterality Date    BREAST LUMPECTOMY      CARDIAC CATHETERIZATION      , non critical at that time    CARDIAC PROCEDURE N/A 2024    Left heart cath / coronary angiography performed by Lebron Andres MD at [facility] CARDIAC CATH LAB    CHOLECYSTECTOMY      CORONARY ARTERY BYPASS GRAFT  2014    CABG X 6    HYSTERECTOMY (CERVIX STATUS UNKNOWN)      SHOULDER SURGERY      TONSILLECTOMY         Patient assessed for rehabilitation services?: Yes  Additional Pertinent Hx: Per H & P note: Rossana Alcantara is a 89 y.o.  female who was admitted to [facility] on 2024 with Chest Pain and Shortness of Breath     89-year-old female with history coronary disease status post CABG , mitral regurgitation, left bundle branch block, hypertension, hyperlipidemia, CHF ejection fraction 40 to 45%, hypothyroidism, proximal A-fib not on anticoagulation presenting with chest pain and fatigue.  She was found to be tachypneic diaphoretic EKG showed left bundle branch block old concern for scarboss criteria patient also started on Zosyn due to meeting SIRS criteria     Cardiology NSTEMI status post cardiac cath 2024 occluded SVG to RCA severe disease RCA/PSI deferred due to advanced age and multiple comorbidities acute hypoxic respite failure due to acute on chronic heart failure with reduced ejection  mobility, SBA for transfers and gait using wheeled walker; CARDIAC PRECAUTIONS, must wear lifevest  Barriers to Learning: cognition  Discharge Recommendations: Home with assist PRN  PT Equipment Recommendations  Equipment Needed:  (has wheeled walker)    CLINICAL IMPRESSION   continue per POC to maxmize potential for safe D/C home.  Pt is very motivated at this time to complete therapy and return home at discharge.    GOALS  Patient Goals   Patient Goals : return home  Short Term Goals  Time Frame for Short Term Goals: 2-3 days  Short Term Goal 1: pt to demonstrate independent bed mobility for rolling and supine <> sit from a flat bed for  position change  Short Term Goal 2: pt to demonstrate transfers w/ good technique using a wheeled walker w/ supervision  Short Term Goal 3: pt to demonstrate gait 150' using a wheeled walker w/ supervision  Short Term Goal 4: pt to demonstrate good ambulatory balance using a wheeled walker  Short Term Goal 5: pt to demonstrate ability to ascend/ descend a platform step using a wheeled walker w/ CGA x 1  Long Term Goals  Time Frame for Long Term Goals : by D/C  Long Term Goal 1: pt to demonstrate MODIFIED independent transfers using a wheeled walker  Long Term Goal 2: pt to demonstrate MODIFIED independent gait 250' on level and uneven surfaces using a wheeled walker for community distances  Long Term Goal 3: pt to demonstrate ability to ascend/ descend 2 steps w/ 2 grab bars independently for home entry  Long Term Goal 4: pt to demonstrate proper technique for car transfer w/ supervision  Long Term Goal 5: pt to demonstrate proper technique (cardiac precautions) for HEP for general strengthening, balance activities and energy conservation    PLAN OF CARE  Frequency: 1-2 treatment sessions per day, 5-7 days per week  Physical Therapy Plan  General Plan:  minutes of therapy at least 5 out of 7 days a week  Specific Instructions for Next Treatment: advance gait distance using

## 2024-09-28 NOTE — PROGRESS NOTES
Physical Therapy  Facility/Department: [facility] ACUTE REHAB  Rehabilitation Physical Therapy Progress Note    NAME: Rossana Alcantara  : 1930 (89 y.o.)  MRN: [id number]  CODE STATUS: DNR-CCA    Date of Service: 24      Past Medical History:   Diagnosis Date    Accelerating angina (HCC)     Arthritis     CAD (coronary artery disease) 5*    cabg    Carotid bruit     bilateral     DM (diabetes mellitus) (HCC)     HTN (hypertension)     Hypothyroid     Mitral regurgitation     mild     Paroxysmal A-fib (HCC)     Not on coumain per pt's wishes     Pericarditis          Past Surgical History:   Procedure Laterality Date    BREAST LUMPECTOMY      CARDIAC CATHETERIZATION      , non critical at that time    CARDIAC PROCEDURE N/A 2024    Left heart cath / coronary angiography performed by Lebron Andres MD at [facility] CARDIAC CATH LAB    CHOLECYSTECTOMY      CORONARY ARTERY BYPASS GRAFT  2014    CABG X 6    HYSTERECTOMY (CERVIX STATUS UNKNOWN)      SHOULDER SURGERY      TONSILLECTOMY         Chart Reviewed: No  Patient assessed for rehabilitation services?: Yes  Additional Pertinent Hx: Per H & P note: Rossana Alcantara is a 89 y.o.  female who was admitted to [facility] on 2024 with Chest Pain and Shortness of Breath     89-year-old female with history coronary disease status post CABG , mitral regurgitation, left bundle branch block, hypertension, hyperlipidemia, CHF ejection fraction 40 to 45%, hypothyroidism, proximal A-fib not on anticoagulation presenting with chest pain and fatigue.  She was found to be tachypneic diaphoretic EKG showed left bundle branch block old concern for scarboss criteria patient also started on Zosyn due to meeting SIRS criteria     Cardiology NSTEMI status post cardiac cath 2024 occluded SVG to RCA severe disease RCA/PSI deferred due to advanced age and multiple comorbidities acute hypoxic respite failure due to acute on chronic heart failure with

## 2024-09-28 NOTE — H&P
Physical Medicine & Rehabilitation History and Physical  [facility] Acute Rehabilitation Unit     Primary care provider:  Modesto Ayala MD     Chief Complaint and Reason for Rehabilitation Admission:   ADL and mobility deficits secondary to recent NSTEMI    History of Present Illness:  Rossana Alcantara is a 89 y.o. female with history of CAD s/p CABG, mitral regurgitation, atrial fibrillation, systolic CHF, HTN, diabetes, hypothyroidism, CKD admitted to [facility] on 9/27/2024.  She was originally admitted to [place] on 9/19/24.    She initially presented with chest pain, fatigue, and nausea.  She was noted to be tachypneic and diaphoretic in the ED.  EKG showed left bundle branch block, which is chronic.  Cardiology recommended trending troponin and cardiac cath the following day.  Cardiac cath on 9/20/24 showed severe three-vessel disease with occlusion of the vein graft to the RCA and severe diffuse disease in the RCA; medical management was recommended.  She is currently on aspirin, plavix, atorvastatin, imdur, and metoprolol.  Echo showed EF 30-35% and she was fitted with a life vest.  Nephrology was consulted for AMADO on CKD.    She is currently requiring assistance for self-care activities and mobility prompting this admission.  She reports mild abdominal cramping and constipation.  She denies any chest pain and shortness of breath.    Premorbid function:  Independent    Current Function:    Physical Therapy    Restrictions/Precautions: General Precautions, Fall Risk (life vest)  Implants present? : Metal implants (pt denies; Hx CABG x 6 in 2014 ? sternal wires)  Other position/activity restrictions: up as tolerated/ up w/ assist/ use lift equipment  Required Braces or Orthoses  Other:  (lifevest)    Bed mobility  Rolling to Left: Supervision (used hand rail)  Rolling to Right: Supervision (used hand rail)  Supine to Sit: Supervision (used hand rail)  Sit to  for gait, mobility, strengthening, endurance, ADLs, and self care.  Continue aspirin, plavix, atorvastatin, imdur, and metoprolol.  AMADO on CKD:  Creatinine 2.2 on 9/28, stable.  Will monitor.  Anemia:  Hemoglobin 9.6 on 9/28, stable.  Will monitor.  Insomnia:  Continue melatonin nightly  Mitral regurgitation  Atrial fibrillation:  Continue aspirin, metoprolol  Systolic CHF:  EF 30-35%.  Life vest in place.  Continue metoprolol.  Holding ACE-I/ARB due to AMADO on CKD.  HTN:  Continue amlodipine, imdur, metoprolol  Diabetes:  Continue insulin sliding scale  Hypothyroidism:  Continue levothyroxine  Bowel Management: Miralax daily, senokot prn, dulcolax prn.  DVT Prophylaxis:  heparin  Internal Medicine for medical management  Follow up PCP 1-2 weeks, Cardiology, Nephrology      Estimated Length of Stay:  1 week - expect her to progress with therapies in this time period    Prognosis  Good - independent at baseline, seems to be recovering well thus far    Goals  Home at Independent  Supervision at Discharge: Intermittent      Shannan Coe MD

## 2024-09-29 LAB
ANION GAP SERPL CALCULATED.3IONS-SCNC: 14 MMOL/L (ref 9–17)
BUN SERPL-MCNC: 67 MG/DL (ref 8–23)
CALCIUM SERPL-MCNC: 9.2 MG/DL (ref 8.6–10.4)
CHLORIDE SERPL-SCNC: 95 MMOL/L (ref 98–107)
CO2 SERPL-SCNC: 25 MMOL/L (ref 20–31)
CREAT SERPL-MCNC: 2.1 MG/DL (ref 0.5–0.9)
GFR, ESTIMATED: 21 ML/MIN/1.73M2
GLUCOSE BLD-MCNC: 135 MG/DL (ref 65–105)
GLUCOSE BLD-MCNC: 163 MG/DL (ref 65–105)
GLUCOSE BLD-MCNC: 179 MG/DL (ref 65–105)
GLUCOSE SERPL-MCNC: 202 MG/DL (ref 70–99)
POTASSIUM SERPL-SCNC: 4.5 MMOL/L (ref 3.7–5.3)
SODIUM SERPL-SCNC: 134 MMOL/L (ref 135–144)

## 2024-09-29 PROCEDURE — 6370000000 HC RX 637 (ALT 250 FOR IP)

## 2024-09-29 PROCEDURE — 99232 SBSQ HOSP IP/OBS MODERATE 35: CPT | Performed by: INTERNAL MEDICINE

## 2024-09-29 PROCEDURE — 6360000002 HC RX W HCPCS

## 2024-09-29 PROCEDURE — 97116 GAIT TRAINING THERAPY: CPT

## 2024-09-29 PROCEDURE — 99232 SBSQ HOSP IP/OBS MODERATE 35: CPT | Performed by: STUDENT IN AN ORGANIZED HEALTH CARE EDUCATION/TRAINING PROGRAM

## 2024-09-29 PROCEDURE — 80048 BASIC METABOLIC PNL TOTAL CA: CPT

## 2024-09-29 PROCEDURE — 97530 THERAPEUTIC ACTIVITIES: CPT

## 2024-09-29 PROCEDURE — 36415 COLL VENOUS BLD VENIPUNCTURE: CPT

## 2024-09-29 PROCEDURE — 82947 ASSAY GLUCOSE BLOOD QUANT: CPT

## 2024-09-29 PROCEDURE — 97110 THERAPEUTIC EXERCISES: CPT

## 2024-09-29 PROCEDURE — 97535 SELF CARE MNGMENT TRAINING: CPT

## 2024-09-29 PROCEDURE — 6370000000 HC RX 637 (ALT 250 FOR IP): Performed by: NURSE PRACTITIONER

## 2024-09-29 PROCEDURE — 6370000000 HC RX 637 (ALT 250 FOR IP): Performed by: STUDENT IN AN ORGANIZED HEALTH CARE EDUCATION/TRAINING PROGRAM

## 2024-09-29 PROCEDURE — 1180000000 HC REHAB R&B

## 2024-09-29 RX ORDER — HYDROXYZINE HYDROCHLORIDE 10 MG/1
10 TABLET, FILM COATED ORAL ONCE
Status: COMPLETED | OUTPATIENT
Start: 2024-09-29 | End: 2024-09-29

## 2024-09-29 RX ORDER — HEPARIN SODIUM 5000 [USP'U]/ML
5000 INJECTION, SOLUTION INTRAVENOUS; SUBCUTANEOUS EVERY 8 HOURS SCHEDULED
Status: DISCONTINUED | OUTPATIENT
Start: 2024-09-29 | End: 2024-10-04 | Stop reason: HOSPADM

## 2024-09-29 RX ADMIN — CLOPIDOGREL BISULFATE 75 MG: 75 TABLET ORAL at 07:28

## 2024-09-29 RX ADMIN — LEVOTHYROXINE SODIUM 125 MCG: 125 TABLET ORAL at 05:45

## 2024-09-29 RX ADMIN — ESTRADIOL 0.5 MG: 1 TABLET ORAL at 20:01

## 2024-09-29 RX ADMIN — HEPARIN SODIUM 5000 UNITS: 5000 INJECTION INTRAVENOUS; SUBCUTANEOUS at 05:45

## 2024-09-29 RX ADMIN — METOPROLOL TARTRATE 25 MG: 25 TABLET, FILM COATED ORAL at 07:28

## 2024-09-29 RX ADMIN — ACETAMINOPHEN 650 MG: 325 TABLET ORAL at 13:29

## 2024-09-29 RX ADMIN — FUROSEMIDE 40 MG: 40 TABLET ORAL at 07:29

## 2024-09-29 RX ADMIN — ATORVASTATIN CALCIUM 40 MG: 40 TABLET, FILM COATED ORAL at 19:56

## 2024-09-29 RX ADMIN — Medication 6 MG: at 19:56

## 2024-09-29 RX ADMIN — HEPARIN SODIUM 5000 UNITS: 5000 INJECTION INTRAVENOUS; SUBCUTANEOUS at 07:30

## 2024-09-29 RX ADMIN — AMLODIPINE BESYLATE 5 MG: 5 TABLET ORAL at 19:56

## 2024-09-29 RX ADMIN — ASPIRIN 81 MG 81 MG: 81 TABLET ORAL at 07:28

## 2024-09-29 RX ADMIN — ESTRADIOL 0.5 MG: 1 TABLET ORAL at 07:34

## 2024-09-29 RX ADMIN — ROPINIROLE HYDROCHLORIDE 0.25 MG: 0.25 TABLET, FILM COATED ORAL at 21:44

## 2024-09-29 RX ADMIN — FAMOTIDINE 20 MG: 20 TABLET, FILM COATED ORAL at 07:29

## 2024-09-29 RX ADMIN — ROPINIROLE HYDROCHLORIDE 0.25 MG: 0.25 TABLET, FILM COATED ORAL at 07:33

## 2024-09-29 RX ADMIN — METOPROLOL TARTRATE 25 MG: 25 TABLET, FILM COATED ORAL at 19:56

## 2024-09-29 RX ADMIN — HEPARIN SODIUM 5000 UNITS: 5000 INJECTION INTRAVENOUS; SUBCUTANEOUS at 21:44

## 2024-09-29 RX ADMIN — ISOSORBIDE MONONITRATE 60 MG: 30 TABLET, EXTENDED RELEASE ORAL at 19:56

## 2024-09-29 RX ADMIN — HYDROXYZINE HYDROCHLORIDE 10 MG: 10 TABLET ORAL at 23:13

## 2024-09-29 RX ADMIN — ISOSORBIDE MONONITRATE 60 MG: 30 TABLET, EXTENDED RELEASE ORAL at 07:29

## 2024-09-29 ASSESSMENT — PAIN SCALES - GENERAL
PAINLEVEL_OUTOF10: 3
PAINLEVEL_OUTOF10: 0
PAINLEVEL_OUTOF10: 0
PAINLEVEL_OUTOF10: 2
PAINLEVEL_OUTOF10: 0

## 2024-09-29 ASSESSMENT — PAIN DESCRIPTION - LOCATION: LOCATION: HEAD

## 2024-09-29 ASSESSMENT — PAIN DESCRIPTION - DESCRIPTORS: DESCRIPTORS: ACHING

## 2024-09-29 ASSESSMENT — PAIN - FUNCTIONAL ASSESSMENT: PAIN_FUNCTIONAL_ASSESSMENT: ACTIVITIES ARE NOT PREVENTED

## 2024-09-29 ASSESSMENT — PAIN DESCRIPTION - ORIENTATION: ORIENTATION: OTHER (COMMENT)

## 2024-09-29 NOTE — PROGRESS NOTES
Physical Therapy  Facility/Department: [facility] ACUTE REHAB  Rehabilitation Physical Therapy Progress Note    NAME: Rossana Alcantara  : 1930 (89 y.o.)  MRN: [id number]  CODE STATUS: DNR-CCA    Date of Service: 24      Past Medical History:   Diagnosis Date    Accelerating angina (HCC)     Arthritis     CAD (coronary artery disease) 5*    cabg    Carotid bruit     bilateral     DM (diabetes mellitus) (HCC)     HTN (hypertension)     Hypothyroid     Mitral regurgitation     mild     Paroxysmal A-fib (HCC)     Not on coumain per pt's wishes     Pericarditis          Past Surgical History:   Procedure Laterality Date    BREAST LUMPECTOMY      CARDIAC CATHETERIZATION      , non critical at that time    CARDIAC PROCEDURE N/A 2024    Left heart cath / coronary angiography performed by Lebron Andres MD at [facility] CARDIAC CATH LAB    CHOLECYSTECTOMY      CORONARY ARTERY BYPASS GRAFT  2014    CABG X 6    HYSTERECTOMY (CERVIX STATUS UNKNOWN)      SHOULDER SURGERY      TONSILLECTOMY         Chart Reviewed: No  Patient assessed for rehabilitation services?: Yes  Additional Pertinent Hx: Per H & P note: Rossana Alcantara is a 89 y.o.  female who was admitted to [facility] on 2024 with Chest Pain and Shortness of Breath     89-year-old female with history coronary disease status post CABG , mitral regurgitation, left bundle branch block, hypertension, hyperlipidemia, CHF ejection fraction 40 to 45%, hypothyroidism, proximal A-fib not on anticoagulation presenting with chest pain and fatigue.  She was found to be tachypneic diaphoretic EKG showed left bundle branch block old concern for scarboss criteria patient also started on Zosyn due to meeting SIRS criteria     Cardiology NSTEMI status post cardiac cath 2024 occluded SVG to RCA severe disease RCA/PSI deferred due to advanced age and multiple comorbidities acute hypoxic respite failure due to acute on chronic heart failure with  Devices  Type of Devices: All fall risk precautions in place;Patient at risk for falls;Gait belt;Bed alarm in place;Call light within reach;Left in bed (left in bed after AM sesssion upon request, left in bedside recliner with alram intact after PM session)  Restraints  Restraints Initially in Place: No    EDUCATION  Education  Education Given To: Patient  Education Provided: Plan of Care;Precautions;Safety;Transfer Training;Mobility Training;Energy Conservation;Fall Prevention Strategies;Equipment (rollator braking mechanism & benefits of using rollator vs RW)  Education Provided Comments: breathing technique  Education Method: Demonstration;Verbal  Barriers to Learning: Vision  Education Outcome: Verbalized understanding;Continued education needed    Therapy Time     09/29/24 0800 09/29/24 1453   PT Individual Minutes   Time In 0800 1304   Time Out 0900 1339   Minutes 60 35         Nidia Burrows PTA, 09/29/24 at 2:54 PM

## 2024-09-29 NOTE — PLAN OF CARE
Problem: Discharge Planning  Goal: Discharge to home or other facility with appropriate resources  9/28/2024 2101 by María Ferreira RN  Outcome: Progressing  Flowsheets (Taken 9/28/2024 2000)  Discharge to home or other facility with appropriate resources:   Identify barriers to discharge with patient and caregiver   Arrange for needed discharge resources and transportation as appropriate   Identify discharge learning needs (meds, wound care, etc)  9/28/2024 1019 by Khalida Beaulieu LPN  Outcome: Progressing     Problem: Safety - Adult  Goal: Free from fall injury  9/28/2024 2101 by María Ferreira RN  Outcome: Progressing  Flowsheets (Taken 9/28/2024 2000)  Free From Fall Injury:   Instruct family/caregiver on patient safety   Based on caregiver fall risk screen, instruct family/caregiver to ask for assistance with transferring infant if caregiver noted to have fall risk factors  9/28/2024 1019 by Khalida Beaulieu LPN  Outcome: Progressing     Problem: Chronic Conditions and Co-morbidities  Goal: Patient's chronic conditions and co-morbidity symptoms are monitored and maintained or improved  9/28/2024 2101 by María Ferreira RN  Outcome: Progressing  Flowsheets (Taken 9/28/2024 2000)  Care Plan - Patient's Chronic Conditions and Co-Morbidity Symptoms are Monitored and Maintained or Improved:   Monitor and assess patient's chronic conditions and comorbid symptoms for stability, deterioration, or improvement   Collaborate with multidisciplinary team to address chronic and comorbid conditions and prevent exacerbation or deterioration   Update acute care plan with appropriate goals if chronic or comorbid symptoms are exacerbated and prevent overall improvement and discharge  9/28/2024 1019 by Khalida Beaulieu LPN  Outcome: Progressing     Problem: Skin/Tissue Integrity  Goal: Absence of new skin breakdown  Description: 1.  Monitor for areas of redness and/or skin breakdown  2.  Assess vascular access sites

## 2024-09-29 NOTE — PLAN OF CARE
Individualized Plan of Care  [facility] Rehabilitation Unit    Rehabilitation physician: Dr. Coe     Admit Date: 9/27/2024     Rehabilitation Diagnosis: Debility     Rehabilitation impairments: self care, mobility, bowel/bladder management, and safety    Factors facilitating achievement of predicted outcomes: Family support, Motivated, Cooperative, and Pleasant  Barriers to the achievement of predicted outcomes: Skin Care and Medication managment    Patient Goals: Improve independence with mobility, Increase overall strength and endurance, Increase balance, Increase endurance, Increase independence with activities of daily living, Increase safety awareness, Assure adequate nutritional option for discharge, Continence of bowel and bladder, and Provide appropriate patient and family education      NURSING:  Nursing goals for Rossana Alcantara while on the rehabilitation unit will include:  Continence of bowel and bladder, Adequate number of bowel movements, Urinate with no urinary retention >300ml in bladder, Maintain O2 SATs at an acceptable level during stay, Absence of skin breakdown while on the rehabilitation unit, Avoidance of any hospital acquired infections, Freedom from injury during hospitalization, and Complete education with patient/family with understanding demonstrated regarding disease process and resultant impairment     In order to achieve these goals, nursing interventions may include bowel/bladder training, education for medical assistive devices, medication education, O2 saturation management, energy conservation, stress management techniques, fall prevention, alarms protocol, seating and positioning, skin/wound care, pressure relief instruction, dressing changes, infection protection, DVT prophylaxis, assistance with safe transfers , and/or assistance with bathroom activities and hygiene.       PHYSICAL THERAPY:  Goals:        Short Term Goals  Time Frame for Short Term Goals: 2-3

## 2024-09-29 NOTE — PROGRESS NOTES
Life vest removed per pt request. She states that she does not anything to shock her if her heart stops and that is why she is a DNRCC.  Dr Coe notified.

## 2024-09-29 NOTE — PLAN OF CARE
Problem: Safety - Adult  Goal: Free from fall injury  9/29/2024 1043 by Eula Guillermo RN  Outcome: Progressing  9/28/2024 2101 by María Ferreira RN  Outcome: Progressing  Flowsheets (Taken 9/28/2024 2000)  Free From Fall Injury:   Instruct family/caregiver on patient safety   Based on caregiver fall risk screen, instruct family/caregiver to ask for assistance with transferring infant if caregiver noted to have fall risk factors     Problem: Chronic Conditions and Co-morbidities  Goal: Patient's chronic conditions and co-morbidity symptoms are monitored and maintained or improved  9/29/2024 1043 by Eula Guillermo RN  Outcome: Progressing  9/28/2024 2101 by María Ferreira RN  Outcome: Progressing  Flowsheets (Taken 9/28/2024 2000)  Care Plan - Patient's Chronic Conditions and Co-Morbidity Symptoms are Monitored and Maintained or Improved:   Monitor and assess patient's chronic conditions and comorbid symptoms for stability, deterioration, or improvement   Collaborate with multidisciplinary team to address chronic and comorbid conditions and prevent exacerbation or deterioration   Update acute care plan with appropriate goals if chronic or comorbid symptoms are exacerbated and prevent overall improvement and discharge

## 2024-09-29 NOTE — PROGRESS NOTES
[place]   Acute Rehabilitation Occupational Therapy Daily Treatment Note    Date: 24  Patient Name: Rossana Alcantara       Room: 2606/2606-01  MRN: [id number]  Account: [id number]   : 1930  (89 y.o.) Gender: female       Referring Practitioner: Gustavo Burrell MD  Diagnosis: Debility secondary to NSTEMI  Additional Pertinent Hx: Ms. Rossana Alcantara is a 89 y.o.  female who was admitted to [facility] on 2024 with Chest Pain and Shortness of Breath. 89-year-old female with history coronary disease status post CABG , mitral regurgitation, left bundle branch block, hypertension, hyperlipidemia, CHF ejection fraction 40 to 45%, hypothyroidism, proximal A-fib not on anticoagulation presenting with chest pain and fatigue. She was found to be tachypneic diaphoretic EKG showed left bundle branch block old concern for scarboss criteria patient also started on Zosyn due to meeting SIRS criteria. Cardiology NSTEMI status post cardiac cath 2024 occluded SVG to RCA severe disease RCA/PSI deferred due to advanced age and multiple comorbidities acute hypoxic respite failure due to acute on chronic heart failure with reduced ejection fraction, paroxysmal A-fib not on anticoagulation ischemic cardiomyopathy as well as hypertension hyperlipidemia and type 2 diabetes-continue aspirin Plavix statin Imdur and beta-blocker ejection fraction 3035% ordered LifeVest on discharge, GDMT, medical manage     Internal medicine as above troponins uptrending no more chest pain high BNP low ejection fraction pulmonary edema and rising creatinine likely explains rising troponin, acute on chronic CHF with ischemic cardiomyopathy cardiology starting low-dose IV Lasix nephro advised reduced Lasix 80 mg IV every other day, AMADO/CKD contrast versus cardiorenal ultrasound hypoechoic bilateral renal cortex suggestive of underlying parenchymal disease renal follow-up  -Creatinine increased 1.2-2.5,

## 2024-09-29 NOTE — PROGRESS NOTES
IN-PATIENT SERVICE  [facility] Internal Medicine    CONSULTATION / HISTORY AND PHYSICAL EXAMINATION            Date:   9/29/2024  Patient name:  Rossana Alcantara  Date of admission:  9/27/2024  1:30 PM  MRN:   [id number]  Account:  [id number]  YOB: 1930  PCP:    Modesto Ayala MD  Room:   [address] [place] [address]  Code Status:    DNR-CCA    Physician Requesting Consult: Shannan Coe MD    Reason for Consult:  medical management    Chief Complaint:   Medical comanagement    History Obtained From:     Patient medical record nursing staff    History of Present Illness:   Patient, has past medical history multiple medical problem which include coronary artery disease status post CABG back in 2014, ischemic cardiomyopathy, chronic kidney disease, paroxysmal A-fib not on anticoagulation due to anemia  Patient presented to [facility] with chest pain, diagnosed with NSTEMI, underwent cardiac cath, suggestive of diffuse disease, patient was managed conservatively due to her advanced age  Treated with iron tablets for anemia  Patient also evaluated by nephrologist with CKD  Patient feeling better today  No complaints of chest pain, shortness of breath, abdominal pain  Creatinine downtrending from 2.5-1.9  Patient is on dual antiplatelet  Only complaint patient is having his constipation  Past Medical History:     Past Medical History:   Diagnosis Date    Accelerating angina (HCC)     Arthritis     CAD (coronary artery disease) 5*9/14    cabg    Carotid bruit     bilateral     DM (diabetes mellitus) (HCC)     HTN (hypertension)     Hypothyroid     Mitral regurgitation     mild     Paroxysmal A-fib (HCC)     Not on coumain per pt's wishes     Pericarditis     2009        Past Surgical History:     Past Surgical History:   Procedure Laterality Date    BREAST LUMPECTOMY      CARDIAC CATHETERIZATION      2009, non critical at that time    CARDIAC PROCEDURE N/A 9/20/2024    Left heart cath /

## 2024-09-29 NOTE — PROGRESS NOTES
HS  isosorbide mononitrate (IMDUR) extended release tablet 60 mg, 60 mg, Oral, BID  levothyroxine (SYNTHROID) tablet 125 mcg, 125 mcg, Oral, Daily  melatonin tablet 6 mg, 6 mg, Oral, Nightly  metoprolol tartrate (LOPRESSOR) tablet 25 mg, 25 mg, Oral, BID  nitroGLYCERIN (NITROSTAT) SL tablet 0.4 mg, 0.4 mg, SubLINGual, Q5 Min PRN  ondansetron (ZOFRAN-ODT) disintegrating tablet 4 mg, 4 mg, Oral, Q8H PRN **OR** [DISCONTINUED] ondansetron (ZOFRAN) injection 4 mg, 4 mg, IntraVENous, Q6H PRN  oxyCODONE-acetaminophen (PERCOCET) 5-325 MG per tablet 1 tablet, 1 tablet, Oral, Q6H PRN  rOPINIRole (REQUIP) tablet 0.25 mg, 0.25 mg, Oral, TID  sennosides-docusate sodium (SENOKOT-S) 8.6-50 MG tablet 2 tablet, 2 tablet, Oral, Daily  0.9 % sodium chloride infusion, , IntraVENous, PRN  acetaminophen (TYLENOL) tablet 650 mg, 650 mg, Oral, Q4H PRN  polyethylene glycol (GLYCOLAX) packet 17 g, 17 g, Oral, Daily  sodium chloride flush 0.9 % injection 5-40 mL, 5-40 mL, IntraVENous, PRN    Objective:  BP (!) 131/48   Pulse 74   Temp 97.7 °F (36.5 °C) (Oral)   Resp 16   Ht 1.65 m (5' 4.96\")   Wt 64 kg (141 lb)   SpO2 99%   BMI 23.49 kg/m²       GEN: Well developed, well nourished, no acute distress  HEENT: Normocephalic, atraumatic.  EOM grossly intact.  Hearing grossly intact.  Mucous membranes pink and moist.  RESP: Normal breath sounds with no wheezing, rales, or rhonchi. Respirations WNL and unlabored.  CV: Regular rate and rhythm. No murmurs, rubs, or gallops.  ABD: Soft, non-distended, bowel sounds present and equal.  NEURO: Alert.  Speech fluent.  Sensation to light touch intact.  MSK: Muscle bulk is normal bilaterally. Strength 4+/5 in the bilateral upper limbs.  Strength 4+/5 with bilateral ankle dorsiflexion.  LIMBS: No edema in bilateral lower limbs.  SKIN: Warm and dry with good turgor.  PSYCH: Mood WNL. Affect WNL. Appropriately interactive.    Diagnostics:     CBC:   Recent Labs     09/27/24  0355 09/28/24  0714   WBC  5.2 5.5   RBC 2.29* 2.36*   HGB 9.0* 9.6*   HCT 27.5* 28.8*   .1* 122.2*   RDW 14.6* 15.3*    219     BMP:   Recent Labs     09/27/24  0355 09/28/24  0714 09/29/24  1005   * 133* 134*   K 4.4 4.9 4.5   CL 94* 91* 95*   CO2 26 28 25   BUN 50* 60* 67*   CREATININE 1.9* 2.2* 2.1*   GLUCOSE 168* 148* 202*     BNP: No results for input(s): \"BNP\" in the last 72 hours.  PT/INR: No results for input(s): \"PROTIME\", \"INR\" in the last 72 hours.  APTT: No results for input(s): \"APTT\" in the last 72 hours.  CARDIAC ENZYMES: No results for input(s): \"CKMB\", \"CKMBINDEX\", \"TROPONINT\" in the last 72 hours.    Invalid input(s): \"CKTOTAL;3\"  FASTING LIPID PANEL:No results found for: \"CHOL\", \"HDL\", \"TRIG\"  LIVER PROFILE:   Recent Labs     09/28/24  0714   AST 21   ALT 17   BILIDIR 0.1   BILITOT 0.4   ALKPHOS 56        Reviewed notes from internal medicine, PT, OT.      Impression/Plan:   Impaired ADLs, gait, and mobility due to:    Debility secondary to recent NSTEMI in the setting of known CAD s/p CABG:  PT/OT for gait, mobility, strengthening, endurance, ADLs, and self care.  Continue aspirin, plavix, atorvastatin, imdur, and metoprolol.  AMADO on CKD:  Creatinine 2.1 on 9/29, stable.  Will monitor.  Anemia:  Hemoglobin 9.6 on 9/28, stable.  Will monitor.  Insomnia:  Continue melatonin nightly  Mitral regurgitation  Atrial fibrillation:  Continue aspirin, metoprolol  Systolic CHF:  EF 30-35%.  Patient does not want to wear the life vest, as she has chosen to be a DNR-CCA - she expresses understanding of the risks of not wearing it.  Continue metoprolol.  Holding ACE-I/ARB due to AMADO on CKD.  HTN:  Continue amlodipine, imdur, metoprolol  Diabetes:  Continue insulin sliding scale  Hypothyroidism:  Continue levothyroxine  Bowel Management: Miralax daily, senokot prn, dulcolax prn.  DVT Prophylaxis:  heparin  Internal Medicine for medical management  Follow up PCP 1-2 weeks, Cardiology, Nephrology      Electronically

## 2024-09-30 LAB
GLUCOSE BLD-MCNC: 159 MG/DL (ref 65–105)
GLUCOSE BLD-MCNC: 171 MG/DL (ref 65–105)
GLUCOSE BLD-MCNC: 228 MG/DL (ref 65–105)
GLUCOSE BLD-MCNC: 240 MG/DL (ref 65–105)

## 2024-09-30 PROCEDURE — 6370000000 HC RX 637 (ALT 250 FOR IP): Performed by: STUDENT IN AN ORGANIZED HEALTH CARE EDUCATION/TRAINING PROGRAM

## 2024-09-30 PROCEDURE — 82947 ASSAY GLUCOSE BLOOD QUANT: CPT

## 2024-09-30 PROCEDURE — 6360000002 HC RX W HCPCS

## 2024-09-30 PROCEDURE — 99232 SBSQ HOSP IP/OBS MODERATE 35: CPT | Performed by: INTERNAL MEDICINE

## 2024-09-30 PROCEDURE — 97116 GAIT TRAINING THERAPY: CPT

## 2024-09-30 PROCEDURE — 97530 THERAPEUTIC ACTIVITIES: CPT

## 2024-09-30 PROCEDURE — 99232 SBSQ HOSP IP/OBS MODERATE 35: CPT | Performed by: PHYSICAL MEDICINE & REHABILITATION

## 2024-09-30 PROCEDURE — 97535 SELF CARE MNGMENT TRAINING: CPT

## 2024-09-30 PROCEDURE — 97110 THERAPEUTIC EXERCISES: CPT

## 2024-09-30 PROCEDURE — 6370000000 HC RX 637 (ALT 250 FOR IP)

## 2024-09-30 PROCEDURE — 1180000000 HC REHAB R&B

## 2024-09-30 PROCEDURE — 6370000000 HC RX 637 (ALT 250 FOR IP): Performed by: INTERNAL MEDICINE

## 2024-09-30 RX ORDER — METOPROLOL TARTRATE 25 MG/1
25 TABLET, FILM COATED ORAL 2 TIMES DAILY
Status: DISCONTINUED | OUTPATIENT
Start: 2024-09-30 | End: 2024-10-02

## 2024-09-30 RX ADMIN — ISOSORBIDE MONONITRATE 60 MG: 30 TABLET, EXTENDED RELEASE ORAL at 21:00

## 2024-09-30 RX ADMIN — ESTRADIOL 0.5 MG: 1 TABLET ORAL at 21:03

## 2024-09-30 RX ADMIN — FUROSEMIDE 40 MG: 40 TABLET ORAL at 09:26

## 2024-09-30 RX ADMIN — ROPINIROLE HYDROCHLORIDE 0.25 MG: 0.25 TABLET, FILM COATED ORAL at 21:03

## 2024-09-30 RX ADMIN — ISOSORBIDE MONONITRATE 60 MG: 30 TABLET, EXTENDED RELEASE ORAL at 09:26

## 2024-09-30 RX ADMIN — SENNOSIDES AND DOCUSATE SODIUM 2 TABLET: 50; 8.6 TABLET ORAL at 09:26

## 2024-09-30 RX ADMIN — METOPROLOL TARTRATE 25 MG: 25 TABLET, FILM COATED ORAL at 21:00

## 2024-09-30 RX ADMIN — LEVOTHYROXINE SODIUM 125 MCG: 125 TABLET ORAL at 05:37

## 2024-09-30 RX ADMIN — ASPIRIN 81 MG 81 MG: 81 TABLET ORAL at 09:26

## 2024-09-30 RX ADMIN — ESTRADIOL 0.5 MG: 1 TABLET ORAL at 09:26

## 2024-09-30 RX ADMIN — Medication 6 MG: at 21:00

## 2024-09-30 RX ADMIN — HEPARIN SODIUM 5000 UNITS: 5000 INJECTION INTRAVENOUS; SUBCUTANEOUS at 21:00

## 2024-09-30 RX ADMIN — ROPINIROLE HYDROCHLORIDE 0.25 MG: 0.25 TABLET, FILM COATED ORAL at 14:41

## 2024-09-30 RX ADMIN — AMLODIPINE BESYLATE 5 MG: 5 TABLET ORAL at 21:00

## 2024-09-30 RX ADMIN — CLOPIDOGREL BISULFATE 75 MG: 75 TABLET ORAL at 09:26

## 2024-09-30 RX ADMIN — INSULIN LISPRO 2 UNITS: 100 INJECTION, SOLUTION INTRAVENOUS; SUBCUTANEOUS at 21:15

## 2024-09-30 RX ADMIN — HEPARIN SODIUM 5000 UNITS: 5000 INJECTION INTRAVENOUS; SUBCUTANEOUS at 14:39

## 2024-09-30 RX ADMIN — FAMOTIDINE 20 MG: 20 TABLET, FILM COATED ORAL at 09:26

## 2024-09-30 RX ADMIN — ROPINIROLE HYDROCHLORIDE 0.25 MG: 0.25 TABLET, FILM COATED ORAL at 09:27

## 2024-09-30 RX ADMIN — INSULIN LISPRO 2 UNITS: 100 INJECTION, SOLUTION INTRAVENOUS; SUBCUTANEOUS at 16:38

## 2024-09-30 RX ADMIN — HEPARIN SODIUM 5000 UNITS: 5000 INJECTION INTRAVENOUS; SUBCUTANEOUS at 05:37

## 2024-09-30 RX ADMIN — ATORVASTATIN CALCIUM 40 MG: 40 TABLET, FILM COATED ORAL at 21:00

## 2024-09-30 ASSESSMENT — PAIN SCALES - GENERAL
PAINLEVEL_OUTOF10: 0
PAINLEVEL_OUTOF10: 0

## 2024-09-30 NOTE — PLAN OF CARE
Problem: Discharge Planning  Goal: Discharge to home or other facility with appropriate resources  Outcome: Progressing     Problem: Safety - Adult  Goal: Free from fall injury  Outcome: Progressing     Problem: Chronic Conditions and Co-morbidities  Goal: Patient's chronic conditions and co-morbidity symptoms are monitored and maintained or improved  Outcome: Progressing     Problem: Skin/Tissue Integrity  Goal: Absence of new skin breakdown  Description: 1.  Monitor for areas of redness and/or skin breakdown  2.  Assess vascular access sites hourly  3.  Every 4-6 hours minimum:  Change oxygen saturation probe site  4.  Every 4-6 hours:  If on nasal continuous positive airway pressure, respiratory therapy assess nares and determine need for appliance change or resting period.  Outcome: Progressing     Problem: ABCDS Injury Assessment  Goal: Absence of physical injury  Outcome: Progressing     Problem: Cardiovascular - Adult  Goal: Maintains optimal cardiac output and hemodynamic stability  Outcome: Progressing  Goal: Absence of cardiac dysrhythmias or at baseline  Outcome: Progressing     Problem: Musculoskeletal - Adult  Goal: Return mobility to safest level of function  Outcome: Progressing     Problem: Gastrointestinal - Adult  Goal: Maintains adequate nutritional intake  Outcome: Progressing     Problem: Genitourinary - Adult  Goal: Absence of urinary retention  Outcome: Progressing     Problem: Metabolic/Fluid and Electrolytes - Adult  Goal: Electrolytes maintained within normal limits  Outcome: Progressing     Problem: Pain  Goal: Verbalizes/displays adequate comfort level or baseline comfort level  Outcome: Progressing

## 2024-09-30 NOTE — CARE COORDINATION
ARU CASE MANAGEMENT NOTE:    Admission Date:  9/27/2024 Rossana Alcantara is a 89 y.o.  female    Admitted for : Debility [R53.81]    Patient is alert and oriented x4.    Spoke with patient regarding discharge plan: updated patient that, per Odette (liaison), [facility] is able to accept at discharge. Patient has LifeVest but is not wearing at this time. Per conversation patient had with therapy, she would like home care as she is unable to afford OP copays ($40/visit) at this time.    Outside appointments while in ARU: none    DME: TBD    Will continue to follow for additional discharge needs.    Electronically signed by Miroslava Goldstein RN on 9/30/2024 at 11:46 AM

## 2024-09-30 NOTE — PROGRESS NOTES
IN-PATIENT SERVICE  [facility] Internal Medicine    CONSULTATION / HISTORY AND PHYSICAL EXAMINATION            Date:   9/30/2024  Patient name:  Rossana Alcantara  Date of admission:  9/27/2024  1:30 PM  MRN:   [id number]  Account:  [id number]  YOB: 1930  PCP:    Modesto Ayala MD  Room:   [address] [place] [address]  Code Status:    DNR-CCA    Physician Requesting Consult: Shannan Coe MD    Reason for Consult:  medical management    Chief Complaint:   Medical comanagement    History Obtained From:     Patient medical record nursing staff    History of Present Illness:   Patient, has past medical history multiple medical problem which include coronary artery disease status post CABG back in 2014, ischemic cardiomyopathy, chronic kidney disease, paroxysmal A-fib not on anticoagulation due to anemia  Patient presented to [facility] with chest pain, diagnosed with NSTEMI, underwent cardiac cath, suggestive of diffuse disease, patient was managed conservatively due to her advanced age  Treated with iron tablets for anemia  Patient also evaluated by nephrologist with CKD  Patient feeling better today  No complaints of chest pain, shortness of breath, abdominal pain  Creatinine downtrending from 2.5-1.9  Patient is on dual antiplatelet  Only complaint patient is having his constipation  Past Medical History:     Past Medical History:   Diagnosis Date    Accelerating angina (HCC)     Arthritis     CAD (coronary artery disease) 5*9/14    cabg    Carotid bruit     bilateral     DM (diabetes mellitus) (HCC)     HTN (hypertension)     Hypothyroid     Mitral regurgitation     mild     Paroxysmal A-fib (HCC)     Not on coumain per pt's wishes     Pericarditis     2009        Past Surgical History:     Past Surgical History:   Procedure Laterality Date    BREAST LUMPECTOMY      CARDIAC CATHETERIZATION      2009, non critical at that time    CARDIAC PROCEDURE N/A 9/20/2024    Left heart cath /  Collection Time: 09/30/24 11:12 AM   Result Value Ref Range    POC Glucose 171 (H) 65 - 105 mg/dL   POC Glucose Fingerstick    Collection Time: 09/30/24  4:27 PM   Result Value Ref Range    POC Glucose 228 (H) 65 - 105 mg/dL       Consultations:   IP CONSULT TO DIETITIAN  IP CONSULT TO SOCIAL WORK  IP CONSULT TO INTERNAL MEDICINE    Assessment :      Primary Problem  Debility    Active Hospital Problems    Diagnosis Date Noted    Debility [R53.81] 09/27/2024       Plan:     Patient has coronary artery disease, admitted recently with NSTEMI, medically managed, currently chest pain-free, on dual antiplatelet, Lipitor, Lopressor  History of atrial fibrillation, rate controlled, not on anticoagulation with history of anemia  Anemia on iron tablets  Chronic kidney disease, evaluated recently by nephrologist, creatinine downtrending  Heparin 5000 every 8 for DVT prophylaxis  Hypertension, controlled  On Pepcid with history of GERD  Dulcolax already ordered for constipation  9/30  No new complaints  Patient, refusing telemetry  Recent lab work with the patient  Working with PT        Troy Patel MD  9/30/2024  4:58 PM    Copy sent to Dr. Ayala, Modesto RICCI MD    Please note that this chart was generated using voice recognition Dragon dictation software.  Although every effort was made to ensure the accuracy of this automated transcription, some errors in transcription may have occurred.

## 2024-09-30 NOTE — PATIENT CARE CONFERENCE
[facility] Acute Inpatient Rehabilitation  TEAM CONFERENCE NOTE  Date: 10/01/24  Patient Name: Rossana Alcantara       Room: 2606/2606-01  MRN: [id number]       : 1930  (89 y.o.)     Gender: female     Referring Practitioner: Dr. ERICH Burrell     PRINCIPAL DIAGNOSIS: Debility    NURSING--------------------------------------------------------------------------------    Bladder Continence  Always Continent  Bowel Continence Always Continent    Date of Last BM: 2024    Bladder/Bowel Program Interventions: No Bladder or Bowel Program Indicated    Wounds/Incisions/Ulcers: No skin issues identified    Pain Control: Patient's pain currently controlled and pain regimen effective as ordered    Pain Medication Regimen Usage Pattern: MAR reviewed and pain medications are being used at the following frequency (Specify Medication, # Doses Administered on average per day, identified patterns of use - for example: time of day, prior to activity/therapy)  Tylenol 650mg every 4 hours as needed, given once sine admission  Percocet 5-325 every 6 hours as needed, has not taken      Fall Risk:  Falling star program initiated    Medication Education Program: Patient able to manage medications and being educated by nursing    Discharge Preparation Patient/Responsible Party Education In Progress:   Fingerstick Glucose Monitoring and Insulin Management and Subcutaneous Injection Education    Nursing specific communication for TEAM: No additional information identified requiring communication at this time    PHYSICAL THERAPY------------------------------------------------------------------      Bed Mobility:    Sit to Supine: Supervision  Scooting: Supervision        Transfers:  Sit to Stand: Stand by assistance  Stand to Sit: Stand by assistance  Bed to Chair: Stand by assistance (with RW)    Ambulation  Surface: Level tile;Ramp  Device: Rolling Walker  Assistance: Stand by assistance  Quality of Gait: slightly flexed  Score: 0    Pre-Admission Status:  Lives With: Alone  Type of Home: House  Home Layout: One level  Home Access: Stairs to enter with rails  Entrance Stairs - Number of Steps: 2 TITO w/ 2 rails in front; 1 + 1 additional platform step w/ 2 grab bars at back entrance- prefers to use back entrance  Entrance Stairs - Rails: Both (Able to reach both rails at same time)  Bathroom Shower/Tub: Tub/Shower unit, Curtain (plastic kitchen chair placed in shower with back)  Bathroom Toilet: Standard (has sink next to it)  Bathroom Equipment: Hand-held shower  Bathroom Accessibility: Accessible  Home Equipment: Walker - Rolling  Has the patient had two or more falls in the past year or any fall with injury in the past year?: No  ADL Assistance: Independent  Homemaking Assistance: Independent  Homemaking Responsibilities: Yes  Ambulation Assistance: Independent (no device)  Transfer Assistance: Independent  Active : Yes  Mode of Transportation: Car  Occupation: Retired  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 Pt reports carpet wilner throughout home  Additional Comments: Pt reports her son lives locally and would be able to provide some assistance upon discharge. Pt reports other son and daughter live in [place].   -----------------------------------------------------------------------------------------------------  Family Education: Family Education not required    Percentage Risk for Readmission: Moderate 19% - 27%   Readmission Risk              Risk of Unplanned Readmission:  23       %    Critical Items: None       Problem / Barrier Intervention / Plan  Results   Impaired mobility Strengthening, endurance and balance activities, functional mobility training.    Altered ability to care for self Remediation and training in adaptive strategies to increase safety and independence with

## 2024-09-30 NOTE — PLAN OF CARE
Problem: Discharge Planning  Goal: Discharge to home or other facility with appropriate resources  9/30/2024 1933 by Jacquelin Cleaning RN  Outcome: Progressing  9/30/2024 0559 by Myla Cuenca RN  Outcome: Progressing     Problem: Safety - Adult  Goal: Free from fall injury  9/30/2024 1933 by Jacquelin Cleaning RN  Outcome: Progressing  9/30/2024 0559 by Myla Cuenca RN  Outcome: Progressing     Problem: Chronic Conditions and Co-morbidities  Goal: Patient's chronic conditions and co-morbidity symptoms are monitored and maintained or improved  9/30/2024 1933 by Jacquelin Cleaning RN  Outcome: Progressing  9/30/2024 0559 by Myla Cuenca RN  Outcome: Progressing     Problem: Skin/Tissue Integrity  Goal: Absence of new skin breakdown  Description: 1.  Monitor for areas of redness and/or skin breakdown  2.  Assess vascular access sites hourly  3.  Every 4-6 hours minimum:  Change oxygen saturation probe site  4.  Every 4-6 hours:  If on nasal continuous positive airway pressure, respiratory therapy assess nares and determine need for appliance change or resting period.  9/30/2024 1933 by Jacquelin Cleaning RN  Outcome: Progressing  9/30/2024 0559 by Myla Cuenca RN  Outcome: Progressing     Problem: ABCDS Injury Assessment  Goal: Absence of physical injury  9/30/2024 1933 by Jacquelin Cleaning RN  Outcome: Progressing  9/30/2024 0559 by Myla Cuenca RN  Outcome: Progressing     Problem: Cardiovascular - Adult  Goal: Maintains optimal cardiac output and hemodynamic stability  9/30/2024 1933 by Jacquelin Cleaning RN  Outcome: Progressing  9/30/2024 0559 by Myla Cuenca RN  Outcome: Progressing  Goal: Absence of cardiac dysrhythmias or at baseline  9/30/2024 1933 by Jacquelin Cleaning RN  Outcome: Progressing  9/30/2024 0559 by Myla Cuenca RN  Outcome: Progressing     Problem: Musculoskeletal - Adult  Goal: Return mobility to safest level of function  9/30/2024 1933 by Jacquelin Cleaning RN  Outcome:

## 2024-09-30 NOTE — PROGRESS NOTES
Physical Therapy  Facility/Department: [facility] ACUTE REHAB      NAME: Rossana Alcantara  : 1930 (89 y.o.)  MRN: [id number]  CODE STATUS: DNR-CCA    Date of Service: 24      Past Medical History:   Diagnosis Date    Accelerating angina (HCC)     Arthritis     CAD (coronary artery disease) 5*    cabg    Carotid bruit     bilateral     DM (diabetes mellitus) (HCC)     HTN (hypertension)     Hypothyroid     Mitral regurgitation     mild     Paroxysmal A-fib (HCC)     Not on coumain per pt's wishes     Pericarditis          Past Surgical History:   Procedure Laterality Date    BREAST LUMPECTOMY      CARDIAC CATHETERIZATION      , non critical at that time    CARDIAC PROCEDURE N/A 2024    Left heart cath / coronary angiography performed by Lebron Andres MD at [facility] CARDIAC CATH LAB    CHOLECYSTECTOMY      CORONARY ARTERY BYPASS GRAFT  2014    CABG X 6    HYSTERECTOMY (CERVIX STATUS UNKNOWN)      SHOULDER SURGERY      TONSILLECTOMY         Chart Reviewed: No  Patient assessed for rehabilitation services?: Yes  Additional Pertinent Hx: Per H & P note: Rossana Alcantara is a 89 y.o.  female who was admitted to [facility] on 2024 with Chest Pain and Shortness of Breath     89-year-old female with history coronary disease status post CABG , mitral regurgitation, left bundle branch block, hypertension, hyperlipidemia, CHF ejection fraction 40 to 45%, hypothyroidism, proximal A-fib not on anticoagulation presenting with chest pain and fatigue.  She was found to be tachypneic diaphoretic EKG showed left bundle branch block old concern for scarboss criteria patient also started on Zosyn due to meeting SIRS criteria     Cardiology NSTEMI status post cardiac cath 2024 occluded SVG to RCA severe disease RCA/PSI deferred due to advanced age and multiple comorbidities acute hypoxic respite failure due to acute on chronic heart failure with reduced ejection fraction, paroxysmal A-fib not on  walker)           GOALS  Patient Goals   Patient Goals : return home  Short Term Goals  Time Frame for Short Term Goals: 2-3 days  Short Term Goal 1: pt to demonstrate independent bed mobility for rolling and supine <> sit from a flat bed for  position change  Short Term Goal 2: pt to demonstrate transfers w/ good technique using a wheeled walker w/ supervision  Short Term Goal 3: pt to demonstrate gait 150' using a wheeled walker w/ supervision  Short Term Goal 4: pt to demonstrate good ambulatory balance using a wheeled walker  Short Term Goal 5: pt to demonstrate ability to ascend/ descend a platform step using a wheeled walker w/ CGA x 1  Long Term Goals  Time Frame for Long Term Goals : by D/C  Long Term Goal 1: pt to demonstrate MODIFIED independent transfers using a wheeled walker  Long Term Goal 2: pt to demonstrate MODIFIED independent gait 250' on level and uneven surfaces using a wheeled walker for community distances  Long Term Goal 3: pt to demonstrate ability to ascend/ descend 2 steps w/ 2 grab bars independently for home entry  Long Term Goal 4: pt to demonstrate proper technique for car transfer w/ supervision  Long Term Goal 5: pt to demonstrate proper technique (cardiac precautions) for HEP for general strengthening, balance activities and energy conservation    PLAN OF CARE  Physical Therapy Plan  General Plan:  minutes of therapy at least 5 out of 7 days a week  Specific Instructions for Next Treatment: advance gait distance using wheeled walker and progress to 2 steps w/ 2 rails or grab bars, instruct in HEP- cardiac precautions, must wear lifevest  Current Treatment Recommendations: Strengthening;Balance training;Functional mobility training;Transfer training;Endurance training;Stair training;Gait training;Home exercise program;Safety education & training;Patient/Caregiver education & training;Equipment evaluation, education, & procurement;Positioning;Therapeutic activities  Safety

## 2024-09-30 NOTE — PROGRESS NOTES
unlabored  ABD: soft, NT, ND, +BS and equal  NEURO: A&O x3. Sensation intact to light touch.   MSK: 4+/5 upper and lower extremity  EXTREMITIES: No calf tenderness to palpation bilaterally. No edema BLEs  SKIN: warm dry and intact with good turgor  PSYCH: appropriately interactive. Affect WNL        Medications   Scheduled Meds:   heparin (porcine)  5,000 Units SubCUTAneous 3 times per day    furosemide  40 mg Oral Daily    amLODIPine  5 mg Oral Nightly    aspirin  81 mg Oral Daily    atorvastatin  40 mg Oral Daily    clopidogrel  75 mg Oral Daily    estradiol  0.5 mg Oral BID    famotidine  20 mg Oral Daily    insulin lispro  0-8 Units SubCUTAneous 4x Daily AC & HS    isosorbide mononitrate  60 mg Oral BID    levothyroxine  125 mcg Oral Daily    melatonin  6 mg Oral Nightly    metoprolol tartrate  25 mg Oral BID    rOPINIRole  0.25 mg Oral TID    sennosides-docusate sodium  2 tablet Oral Daily    polyethylene glycol  17 g Oral Daily     Continuous Infusions:   dextrose      sodium chloride       PRN Meds:.senna, bisacodyl, albuterol sulfate HFA, dextrose, dextrose bolus **OR** dextrose bolus, glucagon (rDNA), glucose, nitroGLYCERIN, ondansetron **OR** [DISCONTINUED] ondansetron, oxyCODONE-acetaminophen, sodium chloride, acetaminophen, sodium chloride flush     Diagnostics:     CBC:   Recent Labs     09/28/24  0714   WBC 5.5   RBC 2.36*   HGB 9.6*   HCT 28.8*   .2*   RDW 15.3*        BMP:   Recent Labs     09/28/24  0714 09/29/24  1005   * 134*   K 4.9 4.5   CL 91* 95*   CO2 28 25   BUN 60* 67*   CREATININE 2.2* 2.1*     BNP: No results for input(s): \"BNP\" in the last 72 hours.  PT/INR: No results for input(s): \"PROTIME\", \"INR\" in the last 72 hours.  APTT: No results for input(s): \"APTT\" in the last 72 hours.  CARDIAC ENZYMES: No results for input(s): \"CKMB\", \"CKMBINDEX\", \"TROPONINT\" in the last 72 hours.    Invalid input(s): \"CKTOTAL;3\"  FASTING LIPID PANEL:No results found for: \"CHOL\", \"HDL\",  Gustavo Choe MD on 9/30/2024 at 7:44 AM     This note is created with the assistance of a speech recognition program.  While intending to generate a document that actually reflects the content of the visit, the document can still have some errors including those of syntax and sound a like substitutions which may escape proof reading.  In such instances, actual meaning can be extrapolated by contextual diversion

## 2024-09-30 NOTE — PROGRESS NOTES
[facility]   Acute Rehabilitation Occupational Therapy Daily Treatment Note    Date: 24  Patient Name: Rossana Alcantara       Room: 2606/2606-01  MRN: [id number]  Account: [id number]   : 1930  (89 y.o.) Gender: female       Referring Practitioner: Gustavo Burrell MD  Diagnosis: Debility secondary to NSTEMI  Additional Pertinent Hx: Ms. Rossana Alcantara is a 89 y.o.  female who was admitted to [facility] on 2024 with Chest Pain and Shortness of Breath. 89-year-old female with history coronary disease status post CABG , mitral regurgitation, left bundle branch block, hypertension, hyperlipidemia, CHF ejection fraction 40 to 45%, hypothyroidism, proximal A-fib not on anticoagulation presenting with chest pain and fatigue. She was found to be tachypneic diaphoretic EKG showed left bundle branch block old concern for scarboss criteria patient also started on Zosyn due to meeting SIRS criteria. Cardiology NSTEMI status post cardiac cath 2024 occluded SVG to RCA severe disease RCA/PSI deferred due to advanced age and multiple comorbidities acute hypoxic respite failure due to acute on chronic heart failure with reduced ejection fraction, paroxysmal A-fib not on anticoagulation ischemic cardiomyopathy as well as hypertension hyperlipidemia and type 2 diabetes-continue aspirin Plavix statin Imdur and beta-blocker ejection fraction 3035% ordered LifeVest on discharge, GDMT, medical manage     Internal medicine as above troponins uptrending no more chest pain high BNP low ejection fraction pulmonary edema and rising creatinine likely explains rising troponin, acute on chronic CHF with ischemic cardiomyopathy cardiology starting low-dose IV Lasix nephro advised reduced Lasix 80 mg IV every other day, AMADO/CKD contrast versus cardiorenal ultrasound hypoechoic bilateral renal cortex suggestive of underlying parenchymal disease renal follow-up  -Creatinine increased 1.2-2.5,  to improve balance/tolerance for participation in self-care tasks  Short Term Goal 5: Pt will verbalize/demonstrate Good understanding of EC/WS techniques to maximize safety and independence during self-care/functional tasks  Additional Goals?: Yes  Short Term Goal 6: Pt will actively participate in 30+ minutes of therapeutic exercise/functional activity/social participation to promote safety and independence with self-care tasks and to improve overall quality of life    Long Term Goals  Time Frame for Long Term Goals : By discharge  Long Term Goal 1: Pt will perform UB bathing/dressing (i.e don/doff life vest) Mod I with Good safety and use of AE/modified techniques as needed  Long Term Goal 2: Pt will perform LB bathing/dressing Mod I with Good safety and use of AE/modified techniques as needed  Long Term Goal 3: Pt will perform functional transfers/mobility Mod I during self-care tasks with Good safety and use of least restrictive device  Long Term Goal 4: Pt will perform tub transfer Mod I with appropriate use of AE/DME as needed  Long Term Goal 5: Pt will perform simple meal prep/light housekeeping Mod I  Long Term Goal 6: Pt will tolerate standing 10+ minutes during functional activity of choice Mod I to improve balance/tolerance for participation of self-care tasks  Long Term Goal 7: Pt will verbalize/demonstrate Good understanding of home safety/fall prevention strategies to promote safety and independence with daily activities    Plan  Occupational Therapy Plan  Times Per Week: 5-7  Times Per Day: Twice a day  Current Treatment Recommendations: Strengthening, Balance training, Functional mobility training, Endurance training, Pain management, Safety education & training, Patient/Caregiver education & training, Equipment evaluation, education, & procurement, Self-Care / ADL, Home management training       09/30/24 1103 09/30/24 1333   OT Individual Minutes   Time In 1103 1333   Time Out 1201 1405   Minutes

## 2024-10-01 LAB
GLUCOSE BLD-MCNC: 129 MG/DL (ref 65–105)
GLUCOSE BLD-MCNC: 210 MG/DL (ref 65–105)
GLUCOSE BLD-MCNC: 221 MG/DL (ref 65–105)
GLUCOSE BLD-MCNC: 241 MG/DL (ref 65–105)

## 2024-10-01 PROCEDURE — 6370000000 HC RX 637 (ALT 250 FOR IP)

## 2024-10-01 PROCEDURE — 97110 THERAPEUTIC EXERCISES: CPT

## 2024-10-01 PROCEDURE — 97530 THERAPEUTIC ACTIVITIES: CPT

## 2024-10-01 PROCEDURE — 6370000000 HC RX 637 (ALT 250 FOR IP): Performed by: STUDENT IN AN ORGANIZED HEALTH CARE EDUCATION/TRAINING PROGRAM

## 2024-10-01 PROCEDURE — 97535 SELF CARE MNGMENT TRAINING: CPT

## 2024-10-01 PROCEDURE — 6360000002 HC RX W HCPCS

## 2024-10-01 PROCEDURE — 99232 SBSQ HOSP IP/OBS MODERATE 35: CPT | Performed by: INTERNAL MEDICINE

## 2024-10-01 PROCEDURE — 1180000000 HC REHAB R&B

## 2024-10-01 PROCEDURE — 97116 GAIT TRAINING THERAPY: CPT

## 2024-10-01 PROCEDURE — 6370000000 HC RX 637 (ALT 250 FOR IP): Performed by: INTERNAL MEDICINE

## 2024-10-01 PROCEDURE — 99232 SBSQ HOSP IP/OBS MODERATE 35: CPT | Performed by: PHYSICAL MEDICINE & REHABILITATION

## 2024-10-01 PROCEDURE — 82947 ASSAY GLUCOSE BLOOD QUANT: CPT

## 2024-10-01 RX ADMIN — ROPINIROLE HYDROCHLORIDE 0.25 MG: 0.25 TABLET, FILM COATED ORAL at 20:21

## 2024-10-01 RX ADMIN — Medication 6 MG: at 20:18

## 2024-10-01 RX ADMIN — FUROSEMIDE 40 MG: 40 TABLET ORAL at 07:24

## 2024-10-01 RX ADMIN — INSULIN LISPRO 2 UNITS: 100 INJECTION, SOLUTION INTRAVENOUS; SUBCUTANEOUS at 11:00

## 2024-10-01 RX ADMIN — METOPROLOL TARTRATE 25 MG: 25 TABLET, FILM COATED ORAL at 20:18

## 2024-10-01 RX ADMIN — FAMOTIDINE 20 MG: 20 TABLET, FILM COATED ORAL at 07:24

## 2024-10-01 RX ADMIN — LEVOTHYROXINE SODIUM 125 MCG: 125 TABLET ORAL at 06:10

## 2024-10-01 RX ADMIN — ROPINIROLE HYDROCHLORIDE 0.25 MG: 0.25 TABLET, FILM COATED ORAL at 07:47

## 2024-10-01 RX ADMIN — HEPARIN SODIUM 5000 UNITS: 5000 INJECTION INTRAVENOUS; SUBCUTANEOUS at 20:22

## 2024-10-01 RX ADMIN — METOPROLOL TARTRATE 25 MG: 25 TABLET, FILM COATED ORAL at 07:24

## 2024-10-01 RX ADMIN — ESTRADIOL 0.5 MG: 1 TABLET ORAL at 07:27

## 2024-10-01 RX ADMIN — ESTRADIOL 0.5 MG: 1 TABLET ORAL at 20:21

## 2024-10-01 RX ADMIN — CLOPIDOGREL BISULFATE 75 MG: 75 TABLET ORAL at 07:24

## 2024-10-01 RX ADMIN — ROPINIROLE HYDROCHLORIDE 0.25 MG: 0.25 TABLET, FILM COATED ORAL at 15:14

## 2024-10-01 RX ADMIN — AMLODIPINE BESYLATE 5 MG: 5 TABLET ORAL at 20:18

## 2024-10-01 RX ADMIN — ISOSORBIDE MONONITRATE 60 MG: 30 TABLET, EXTENDED RELEASE ORAL at 20:18

## 2024-10-01 RX ADMIN — ASPIRIN 81 MG 81 MG: 81 TABLET ORAL at 07:24

## 2024-10-01 RX ADMIN — INSULIN LISPRO 2 UNITS: 100 INJECTION, SOLUTION INTRAVENOUS; SUBCUTANEOUS at 20:58

## 2024-10-01 RX ADMIN — HEPARIN SODIUM 5000 UNITS: 5000 INJECTION INTRAVENOUS; SUBCUTANEOUS at 06:10

## 2024-10-01 RX ADMIN — SENNOSIDES AND DOCUSATE SODIUM 2 TABLET: 50; 8.6 TABLET ORAL at 07:24

## 2024-10-01 RX ADMIN — ATORVASTATIN CALCIUM 40 MG: 40 TABLET, FILM COATED ORAL at 20:18

## 2024-10-01 RX ADMIN — HEPARIN SODIUM 5000 UNITS: 5000 INJECTION INTRAVENOUS; SUBCUTANEOUS at 14:05

## 2024-10-01 RX ADMIN — ISOSORBIDE MONONITRATE 60 MG: 30 TABLET, EXTENDED RELEASE ORAL at 07:31

## 2024-10-01 RX ADMIN — INSULIN LISPRO 2 UNITS: 100 INJECTION, SOLUTION INTRAVENOUS; SUBCUTANEOUS at 16:43

## 2024-10-01 NOTE — CARE COORDINATION
ARU CASE MANAGEMENT NOTE:    Admission Date:  9/27/2024 Rossana Alcantara is a 89 y.o.  female    Admitted for : Debility [R53.81]    Patient is alert and oriented x4.    Spoke with patient regarding discharge plan: plan remains for patient to discharge home with [facility]. Per patient, her son and daughter in law will be staying with her for a little while after discharge. She voiced to the RN CM that her family is understanding of her decision to not wear the LifeVest and that they will be helping to keep an eye on her for a little while.     Outside appointments while in ARU: none    DME: tbd    Will continue to follow for additional discharge needs.    Electronically signed by Miroslava Goldstein RN on 10/1/2024 at 2:25 PM

## 2024-10-01 NOTE — PROGRESS NOTES
[facility] notified of pt refusing to wear lifevest and they said they would end someone today to  equipment.

## 2024-10-01 NOTE — PLAN OF CARE
Problem: Discharge Planning  Goal: Discharge to home or other facility with appropriate resources  10/1/2024 0050 by Fanny Cazares LPN  Outcome: Progressing     Problem: Safety - Adult  Goal: Free from fall injury  10/1/2024 0050 by Fanny Cazares LPN  Outcome: Progressing     Problem: Chronic Conditions and Co-morbidities  Goal: Patient's chronic conditions and co-morbidity symptoms are monitored and maintained or improved  10/1/2024 0050 by Fanny Cazares LPN  Outcome: Progressing     Problem: Skin/Tissue Integrity  Goal: Absence of new skin breakdown  Description: 1.  Monitor for areas of redness and/or skin breakdown  2.  Assess vascular access sites hourly  3.  Every 4-6 hours minimum:  Change oxygen saturation probe site  4.  Every 4-6 hours:  If on nasal continuous positive airway pressure, respiratory therapy assess nares and determine need for appliance change or resting period.  10/1/2024 0050 by Fanny Cazares LPN  Outcome: Progressing     Problem: ABCDS Injury Assessment  Goal: Absence of physical injury  10/1/2024 0050 by Fanny Cazares LPN  Outcome: Progressing     Problem: Cardiovascular - Adult  Goal: Maintains optimal cardiac output and hemodynamic stability  10/1/2024 0050 by Fanny Cazares LPN  Outcome: Progressing     Problem: Cardiovascular - Adult  Goal: Absence of cardiac dysrhythmias or at baseline  10/1/2024 0050 by Fanny Cazares LPN  Outcome: Progressing     Problem: Musculoskeletal - Adult  Goal: Return mobility to safest level of function  10/1/2024 0050 by Fanny Cazares LPN  Outcome: Progressing     Problem: Gastrointestinal - Adult  Goal: Maintains adequate nutritional intake  10/1/2024 0050 by Fanny Cazares LPN  Outcome: Progressing     Problem: Genitourinary - Adult  Goal: Absence of urinary retention  10/1/2024 0050 by Fanny Cazares LPN  Outcome: Progressing     Problem: Metabolic/Fluid and Electrolytes - Adult  Goal: Electrolytes maintained

## 2024-10-01 NOTE — PROGRESS NOTES
Spiritual Health History and Assessment/Progress Note  [facility]    (P) Initial Encounter, Spiritual/Emotional Needs,  ,  ,      Name: Rossana Alcantara MRN: [id number]    Age: 89 y.o.     Sex: female   Language: English   Evangelical: Taoism   Debility     Date: 10/1/2024            Total Time Calculated: (P) 15 min              Spiritual Assessment began in [facility] ACUTE REHAB        Referral/Consult From: (P) Rounding   Encounter Overview/Reason: (P) Initial Encounter, Spiritual/Emotional Needs  Service Provided For: (P) Patient  Patient sitting in bedside recliner,preparing to eat her lunch. Rossana is very pleasant, she has a strong taylor and welcome visit and prayer. Patient requested to hold writer hands during prayer grasping both hands firm.  Taylor, Belief, Meaning:   Patient identifies as spiritual, is connected with a taylor tradition or spiritual practice, and has beliefs or practices that help with coping during difficult times  Family/Friends No family/friends present      Importance and Influence:  Patient has spiritual/personal beliefs that influence decisions regarding their health  Family/Friends No family/friends present    Community:  Patient is connected with a spiritual community and feels well-supported. Support system includes: Children, [place] Community, and Extended family  Family/Friends No family/friends present    Assessment and Plan of Care:     Patient Interventions include: Facilitated expression of thoughts and feelings  Family/Friends Interventions include: Facilitated expression of thoughts and feelings    Patient Plan of Care: Spiritual Care available upon further referral  Family/Friends Plan of Care: No family/friends present    Electronically signed by Chaplain Fred on 10/1/2024 at 12:44 PM

## 2024-10-01 NOTE — PROGRESS NOTES
Physical Medicine & Rehabilitation  Progress Note    10/1/2024 9:37 AM     CC: Ambulatory and ADL dysfunction due to debility secondary recent NSTEMI    Subjective:   Feels well.  No complaints.  Refusing LifeVest-states does not coincide with her DNR CC arrest wishes per patient.  Aware of the risks-cardiac arrhythmias, death.  Denies difficulty of bowels or bladder.  Positive BM     ROS:  Denies fevers, chills, sweats.  No chest pain, palpitations, lightheadedness.  Denies coughing, wheezing or shortness of breath.  Denies abdominal pain, nausea, diarrhea or constipation.  No new areas of joint pain.  Denies new areas of numbness or weakness.  Denies new anxiety or depression issues.  No new skin problems.    Rehabilitation:   PT:    Bed mobility  Rolling to Left: Supervision (used hand rail)  Rolling to Right: Supervision (used hand rail)  Supine to Sit: Supervision (used hand rail)  Sit to Supine: Supervision  Scooting: Supervision  Bed Mobility Comments: Patient up in recliner.    Transfers  Sit to Stand: Stand by assistance  Stand to Sit: Stand by assistance  Bed to Chair: Stand by assistance (with RW)  Stand Pivot Transfers: Stand by assistance (with RW)  Comment: toilet transfer x1, vcs to exhale upon exerting force to stand-good return    Ambulation  Surface: Level tile, Ramp  Device: Rolling Walker  Assistance: Stand by assistance  Quality of Gait: slightly flexed at hips, downward gaze (RW adjusted for height, posture improved)  Gait Deviations: Slow Jordyn  Distance: 140' x3 reps (SpO2 95% on room air, HR 80 bpm)  Comments: cueing for forward gaze, good return  More Ambulation?: Yes  Ambulation 2  Surface - 2: level tile  Device 2: No device  Assistance 2: Contact guard assistance  Quality of Gait 2: shortened symmetrical steps, decreased arm swing, unsteady  Gait Deviations: Slow Jordyn, Decreased arm swing, Decreased step height, Decreased step length  Distance: 15' x2 reps  Comments: per patient

## 2024-10-01 NOTE — PROGRESS NOTES
IN-PATIENT SERVICE  [facility] Internal Medicine    CONSULTATION / HISTORY AND PHYSICAL EXAMINATION            Date:   10/1/2024  Patient name:  Rossana Alcantara  Date of admission:  9/27/2024  1:30 PM  MRN:   [id number]  Account:  [id number]  YOB: 1930  PCP:    Modesto Ayala MD  Room:   [address] [place] [address]  Code Status:    DNR-CCA    Physician Requesting Consult: Shannan Coe MD    Reason for Consult:  medical management    Chief Complaint:   Medical comanagement    History Obtained From:     Patient medical record nursing staff    History of Present Illness:   Patient, has past medical history multiple medical problem which include coronary artery disease status post CABG back in 2014, ischemic cardiomyopathy, chronic kidney disease, paroxysmal A-fib not on anticoagulation due to anemia  Patient presented to [facility] with chest pain, diagnosed with NSTEMI, underwent cardiac cath, suggestive of diffuse disease, patient was managed conservatively due to her advanced age  Treated with iron tablets for anemia  Patient also evaluated by nephrologist with CKD  Patient feeling better today  No complaints of chest pain, shortness of breath, abdominal pain  Creatinine downtrending from 2.5-1.9  Patient is on dual antiplatelet  Only complaint patient is having his constipation  Past Medical History:     Past Medical History:   Diagnosis Date    Accelerating angina (HCC)     Arthritis     CAD (coronary artery disease) 5*9/14    cabg    Carotid bruit     bilateral     DM (diabetes mellitus) (HCC)     HTN (hypertension)     Hypothyroid     Mitral regurgitation     mild     Paroxysmal A-fib (HCC)     Not on coumain per pt's wishes     Pericarditis     2009        Past Surgical History:     Past Surgical History:   Procedure Laterality Date    BREAST LUMPECTOMY      CARDIAC CATHETERIZATION      2009, non critical at that time    CARDIAC PROCEDURE N/A 9/20/2024    Left heart cath /  Enrico Mac MD   allopurinol (ZYLOPRIM) 300 MG tablet Take 1 tablet by mouth daily    Sarah Umanzor MD   Probiotic Product (ANNIE-BID PROBIOTIC) TABS TAKE 1 TABLET BY MOUTH EVERY DAY   Patient not taking: Reported on 9/24/2024 6/22/21   Yusef Thomson MD   colestipol (COLESTID) 1 g tablet Take 1 tablet by mouth 2 times daily  Patient not taking: Reported on 9/24/2024 4/8/21   Yusef Thomson MD   aspirin 81 MG tablet Take 1 tablet by mouth daily    Sarah Umanzor MD   Coenzyme Q-10 100 MG CAPS Take 1 tablet by mouth daily.    Sarah Umanzor MD   Multiple Vitamins-Minerals (THERAPEUTIC MULTIVITAMIN-MINERALS) tablet Take 1 tablet by mouth daily    ProviderSarha MD        Allergies:     Codeine and Codeine    Social History:     Tobacco:    reports that she has never smoked. She has never used smokeless tobacco.  Alcohol:      reports no history of alcohol use.  Drug Use:  reports no history of drug use.    Family History:     Family History   Problem Relation Age of Onset    Coronary Art Dis Mother     Coronary Art Dis Father        Review of Systems:     Positive and Negative as described in HPI.    CONSTITUTIONAL:  negative for fevers, chills, sweats, fatigue, weight loss  HEENT:  negative for vision, hearing changes, runny nose, throat pain  RESPIRATORY:  negative for shortness of breath, cough, congestion, wheezing.  CARDIOVASCULAR:  negative for chest pain, palpitations.  GASTROINTESTINAL: Positive for constipation  GENITOURINARY:  negative for difficulty of urination, burning with urination, frequency   INTEGUMENT:  negative for rash, skin lesions, easy bruising   HEMATOLOGIC/LYMPHATIC:  negative for swelling/edema   ALLERGIC/IMMUNOLOGIC:  negative for urticaria , itching  ENDOCRINE:  negative increase in drinking, increase in urination, hot or cold intolerance  MUSCULOSKELETAL:  negative joint pains, muscle aches, swelling of joints  NEUROLOGICAL:  negative for headaches,

## 2024-10-01 NOTE — PROGRESS NOTES
COVID Vaccination Status   Immunization reconciliation completed via admission navigator No   Is patient's COVID-19 vaccination up to date? No   Click Here for [facility] Definition of \"Up to Date\"   *Please Note: COVID vaccinations are not available for inpatient administration through inpatient pharmacy. If patient would like to receive vaccination, provide education that vaccine may be available for administration upon discharge through an outpatient pharmacy of their choice or primary care provider.     Patient education completed? No - Patient Refuses Vaccine

## 2024-10-01 NOTE — PROGRESS NOTES
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 Pt reports carpet wilner throughout home  Additional Comments: Pt reports her son lives locally and would be able to provide some assistance upon discharge. Pt reports other son and daughter live in [place].      OBJECTIVE  Vision  Vision: Impaired  Vision Exceptions: Wears glasses at all times    Hearing  Hearing: Within functional limits       Sensation  Overall Sensation Status: WFL (pt denies)    Functional Mobility  Bed mobility  Rolling to Left: Supervision  Rolling to Right: Supervision  Supine to Sit: Supervision  Sit to Supine: Supervision  Scooting: Supervision  Bed Mobility Comments: Patient up in recliner.  Transfers  Sit to Stand: Stand by assistance  Stand to Sit: Stand by assistance  Bed to Chair: Stand by assistance (with RW)  Stand Pivot Transfers: Stand by assistance (with RW)  Comment: toilet transfer x1, vcs to exhale upon exerting force to stand-good return  Balance  Posture: Good  Sitting - Static: Good  Sitting - Dynamic: Good  Standing - Static: Good;- (used wheeled walker)  Standing - Dynamic: Fair;+ (used wheeled walker)    Environmental Mobility  Ambulation  Surface: Level tile;Ramp  Device: Rolling Walker  Assistance: Stand by assistance  Quality of Gait: slightly flexed at hips, downward gaze (RW adjusted for height, posture  1510   Minutes 60 30            Tonya Rodriguez, PTA, 10/01/24 at 4:50 PM

## 2024-10-01 NOTE — PLAN OF CARE
Problem: Discharge Planning  Goal: Discharge to home or other facility with appropriate resources  10/1/2024 0940 by Greg Dunne LPN  Outcome: Progressing  10/1/2024 0050 by Fanny Cazares LPN  Outcome: Progressing     Problem: Safety - Adult  Goal: Free from fall injury  10/1/2024 0940 by Greg Dunne LPN  Outcome: Progressing  10/1/2024 0050 by Fanny Cazares LPN  Outcome: Progressing     Problem: Chronic Conditions and Co-morbidities  Goal: Patient's chronic conditions and co-morbidity symptoms are monitored and maintained or improved  10/1/2024 0940 by Greg Dunne LPN  Outcome: Progressing  10/1/2024 0050 by Fanny Cazares LPN  Outcome: Progressing     Problem: Skin/Tissue Integrity  Goal: Absence of new skin breakdown  Description: 1.  Monitor for areas of redness and/or skin breakdown  2.  Assess vascular access sites hourly  3.  Every 4-6 hours minimum:  Change oxygen saturation probe site  4.  Every 4-6 hours:  If on nasal continuous positive airway pressure, respiratory therapy assess nares and determine need for appliance change or resting period.  10/1/2024 0940 by Greg Dunne LPN  Outcome: Progressing  10/1/2024 0050 by Fanny Cazares LPN  Outcome: Progressing     Problem: ABCDS Injury Assessment  Goal: Absence of physical injury  10/1/2024 0940 by Greg Dunne LPN  Outcome: Progressing  10/1/2024 0050 by Fanny Cazares LPN  Outcome: Progressing     Problem: Cardiovascular - Adult  Goal: Maintains optimal cardiac output and hemodynamic stability  10/1/2024 0940 by Greg Dunne LPN  Outcome: Progressing  10/1/2024 0050 by Fanny Cazares LPN  Outcome: Progressing  Goal: Absence of cardiac dysrhythmias or at baseline  10/1/2024 0940 by Greg Dunne LPN  Outcome: Progressing  10/1/2024 0050 by Fanny Cazares LPN  Outcome: Progressing     Problem: Musculoskeletal - Adult  Goal: Return mobility to

## 2024-10-01 NOTE — PROGRESS NOTES
ARU  Short Term Goals  Time Frame for Short Term Goals: 2-3 days  Short Term Goal 1: Pt will perform UB bathing/dressing (i.e. don/doff life vest) SBA with Good safety and use of AE/modified techniques as needed  Short Term Goal 2: Pt will perform LB bathing/dressing SUP with Good safety and use of AE/modified techniques as needed  Short Term Goal 3: Pt will perform functional transfers/mobility SUP during self-care tasks with Good safety and use of least restrictive device  Short Term Goal 4: Pt will tolerate standing 7+ minutes during functional activity of choice SUP to improve balance/tolerance for participation in self-care tasks  Short Term Goal 5: Pt will verbalize/demonstrate Good understanding of EC/WS techniques to maximize safety and independence during self-care/functional tasks  Additional Goals?: Yes  Short Term Goal 6: Pt will actively participate in 30+ minutes of therapeutic exercise/functional activity/social participation to promote safety and independence with self-care tasks and to improve overall quality of life    Long Term Goals  Time Frame for Long Term Goals : By discharge  Long Term Goal 1: Pt will perform UB bathing/dressing (i.e don/doff life vest) Mod I with Good safety and use of AE/modified techniques as needed  Long Term Goal 2: Pt will perform LB bathing/dressing Mod I with Good safety and use of AE/modified techniques as needed  Long Term Goal 3: Pt will perform functional transfers/mobility Mod I during self-care tasks with Good safety and use of least restrictive device  Long Term Goal 4: Pt will perform tub transfer Mod I with appropriate use of AE/DME as needed  Long Term Goal 5: Pt will perform simple meal prep/light housekeeping Mod I  Long Term Goal 6: Pt will tolerate standing 10+ minutes during functional activity of choice Mod I to improve balance/tolerance for participation of self-care tasks  Long Term Goal 7: Pt will verbalize/demonstrate Good understanding of home  safety/fall prevention strategies to promote safety and independence with daily activities    Plan  Occupational Therapy Plan  Times Per Week: 5-7  Times Per Day: Twice a day  Current Treatment Recommendations: Strengthening, Balance training, Functional mobility training, Endurance training, Pain management, Safety education & training, Patient/Caregiver education & training, Equipment evaluation, education, & procurement, Self-Care / ADL, Home management training       10/01/24 1046 10/01/24 1336   OT Individual Minutes   Time In 1046 1336   Time Out 1148 1404   Minutes 62 28        Electronically signed by Marcelina LUKE on 10/1/24 at 3:19 PM EDT

## 2024-10-02 LAB
ANION GAP SERPL CALCULATED.3IONS-SCNC: 12 MMOL/L (ref 9–17)
BUN SERPL-MCNC: 50 MG/DL (ref 8–23)
CALCIUM SERPL-MCNC: 9.1 MG/DL (ref 8.6–10.4)
CHLORIDE SERPL-SCNC: 97 MMOL/L (ref 98–107)
CO2 SERPL-SCNC: 27 MMOL/L (ref 20–31)
CREAT SERPL-MCNC: 1.7 MG/DL (ref 0.5–0.9)
ERYTHROCYTE [DISTWIDTH] IN BLOOD BY AUTOMATED COUNT: 15.6 % (ref 11.5–14.9)
GFR, ESTIMATED: 28 ML/MIN/1.73M2
GLUCOSE BLD-MCNC: 151 MG/DL (ref 65–105)
GLUCOSE BLD-MCNC: 176 MG/DL (ref 65–105)
GLUCOSE BLD-MCNC: 182 MG/DL (ref 65–105)
GLUCOSE BLD-MCNC: 199 MG/DL (ref 65–105)
GLUCOSE SERPL-MCNC: 195 MG/DL (ref 70–99)
HCT VFR BLD AUTO: 26.5 % (ref 36–46)
HGB BLD-MCNC: 9 G/DL (ref 12–16)
MCH RBC QN AUTO: 41.7 PG (ref 26–34)
MCHC RBC AUTO-ENTMCNC: 34.1 G/DL (ref 31–37)
MCV RBC AUTO: 122.3 FL (ref 80–100)
MYOGLOBIN SERPL-MCNC: 60 NG/ML (ref 25–58)
PLATELET # BLD AUTO: 198 K/UL (ref 150–450)
PMV BLD AUTO: 9.6 FL (ref 6–12)
POTASSIUM SERPL-SCNC: 4.7 MMOL/L (ref 3.7–5.3)
RBC # BLD AUTO: 2.16 M/UL (ref 4–5.2)
SODIUM SERPL-SCNC: 136 MMOL/L (ref 135–144)
TROPONIN I SERPL HS-MCNC: 130 NG/L (ref 0–14)
WBC OTHER # BLD: 5.1 K/UL (ref 3.5–11)

## 2024-10-02 PROCEDURE — 93005 ELECTROCARDIOGRAM TRACING: CPT | Performed by: PHYSICAL MEDICINE & REHABILITATION

## 2024-10-02 PROCEDURE — 82947 ASSAY GLUCOSE BLOOD QUANT: CPT

## 2024-10-02 PROCEDURE — 80048 BASIC METABOLIC PNL TOTAL CA: CPT

## 2024-10-02 PROCEDURE — 99232 SBSQ HOSP IP/OBS MODERATE 35: CPT | Performed by: PHYSICAL MEDICINE & REHABILITATION

## 2024-10-02 PROCEDURE — 36415 COLL VENOUS BLD VENIPUNCTURE: CPT

## 2024-10-02 PROCEDURE — 6370000000 HC RX 637 (ALT 250 FOR IP): Performed by: STUDENT IN AN ORGANIZED HEALTH CARE EDUCATION/TRAINING PROGRAM

## 2024-10-02 PROCEDURE — 85027 COMPLETE CBC AUTOMATED: CPT

## 2024-10-02 PROCEDURE — 6370000000 HC RX 637 (ALT 250 FOR IP)

## 2024-10-02 PROCEDURE — 97530 THERAPEUTIC ACTIVITIES: CPT

## 2024-10-02 PROCEDURE — 99223 1ST HOSP IP/OBS HIGH 75: CPT | Performed by: INTERNAL MEDICINE

## 2024-10-02 PROCEDURE — 97110 THERAPEUTIC EXERCISES: CPT

## 2024-10-02 PROCEDURE — 97116 GAIT TRAINING THERAPY: CPT

## 2024-10-02 PROCEDURE — 97535 SELF CARE MNGMENT TRAINING: CPT

## 2024-10-02 PROCEDURE — 84484 ASSAY OF TROPONIN QUANT: CPT

## 2024-10-02 PROCEDURE — 6360000002 HC RX W HCPCS

## 2024-10-02 PROCEDURE — 1180000000 HC REHAB R&B

## 2024-10-02 PROCEDURE — 99232 SBSQ HOSP IP/OBS MODERATE 35: CPT | Performed by: INTERNAL MEDICINE

## 2024-10-02 PROCEDURE — 6370000000 HC RX 637 (ALT 250 FOR IP): Performed by: INTERNAL MEDICINE

## 2024-10-02 PROCEDURE — 83874 ASSAY OF MYOGLOBIN: CPT

## 2024-10-02 RX ORDER — RANOLAZINE 500 MG/1
500 TABLET, EXTENDED RELEASE ORAL 2 TIMES DAILY
Status: DISCONTINUED | OUTPATIENT
Start: 2024-10-02 | End: 2024-10-04 | Stop reason: HOSPADM

## 2024-10-02 RX ORDER — METOPROLOL SUCCINATE 25 MG/1
25 TABLET, EXTENDED RELEASE ORAL DAILY
Status: DISCONTINUED | OUTPATIENT
Start: 2024-10-02 | End: 2024-10-04 | Stop reason: HOSPADM

## 2024-10-02 RX ADMIN — HEPARIN SODIUM 5000 UNITS: 5000 INJECTION INTRAVENOUS; SUBCUTANEOUS at 05:47

## 2024-10-02 RX ADMIN — ATORVASTATIN CALCIUM 40 MG: 40 TABLET, FILM COATED ORAL at 22:23

## 2024-10-02 RX ADMIN — FAMOTIDINE 20 MG: 20 TABLET, FILM COATED ORAL at 08:14

## 2024-10-02 RX ADMIN — METOPROLOL TARTRATE 25 MG: 25 TABLET, FILM COATED ORAL at 08:13

## 2024-10-02 RX ADMIN — LEVOTHYROXINE SODIUM 125 MCG: 125 TABLET ORAL at 05:47

## 2024-10-02 RX ADMIN — FUROSEMIDE 40 MG: 40 TABLET ORAL at 08:14

## 2024-10-02 RX ADMIN — EMPAGLIFLOZIN 10 MG: 10 TABLET, FILM COATED ORAL at 13:23

## 2024-10-02 RX ADMIN — ISOSORBIDE MONONITRATE 60 MG: 30 TABLET, EXTENDED RELEASE ORAL at 22:23

## 2024-10-02 RX ADMIN — AMLODIPINE BESYLATE 5 MG: 5 TABLET ORAL at 22:23

## 2024-10-02 RX ADMIN — ROPINIROLE HYDROCHLORIDE 0.25 MG: 0.25 TABLET, FILM COATED ORAL at 13:24

## 2024-10-02 RX ADMIN — Medication 6 MG: at 22:23

## 2024-10-02 RX ADMIN — RANOLAZINE 500 MG: 500 TABLET, EXTENDED RELEASE ORAL at 13:24

## 2024-10-02 RX ADMIN — ESTRADIOL 0.5 MG: 1 TABLET ORAL at 22:24

## 2024-10-02 RX ADMIN — RANOLAZINE 500 MG: 500 TABLET, EXTENDED RELEASE ORAL at 22:24

## 2024-10-02 RX ADMIN — ESTRADIOL 0.5 MG: 1 TABLET ORAL at 08:14

## 2024-10-02 RX ADMIN — HEPARIN SODIUM 5000 UNITS: 5000 INJECTION INTRAVENOUS; SUBCUTANEOUS at 13:23

## 2024-10-02 RX ADMIN — ISOSORBIDE MONONITRATE 60 MG: 30 TABLET, EXTENDED RELEASE ORAL at 08:14

## 2024-10-02 RX ADMIN — HEPARIN SODIUM 5000 UNITS: 5000 INJECTION INTRAVENOUS; SUBCUTANEOUS at 22:24

## 2024-10-02 RX ADMIN — CLOPIDOGREL BISULFATE 75 MG: 75 TABLET ORAL at 08:14

## 2024-10-02 RX ADMIN — ASPIRIN 81 MG 81 MG: 81 TABLET ORAL at 08:13

## 2024-10-02 RX ADMIN — ROPINIROLE HYDROCHLORIDE 0.25 MG: 0.25 TABLET, FILM COATED ORAL at 22:29

## 2024-10-02 RX ADMIN — ROPINIROLE HYDROCHLORIDE 0.25 MG: 0.25 TABLET, FILM COATED ORAL at 08:15

## 2024-10-02 RX ADMIN — POLYETHYLENE GLYCOL 3350 17 G: 17 POWDER, FOR SOLUTION ORAL at 08:15

## 2024-10-02 NOTE — PROGRESS NOTES
IN-PATIENT SERVICE  [facility] Internal Medicine    CONSULTATION / HISTORY AND PHYSICAL EXAMINATION            Date:   10/2/2024  Patient name:  Rossana Alcantara  Date of admission:  9/27/2024  1:30 PM  MRN:   [id number]  Account:  [id number]  YOB: 1930  PCP:    Modesto Ayala MD  Room:   [address] [place] [address]  Code Status:    DNR-CCA    Physician Requesting Consult: Shannan Coe MD    Reason for Consult:  medical management    Chief Complaint:   Medical comanagement    History Obtained From:     Patient medical record nursing staff    History of Present Illness:   Patient, has past medical history multiple medical problem which include coronary artery disease status post CABG back in 2014, ischemic cardiomyopathy, chronic kidney disease, paroxysmal A-fib not on anticoagulation due to anemia  Patient presented to [facility] with chest pain, diagnosed with NSTEMI, underwent cardiac cath, suggestive of diffuse disease, patient was managed conservatively due to her advanced age  Treated with iron tablets for anemia  Patient also evaluated by nephrologist with CKD  Patient feeling better today  No complaints of chest pain, shortness of breath, abdominal pain  Creatinine downtrending from 2.5-1.9  Patient is on dual antiplatelet  Only complaint patient is having his constipation  Past Medical History:     Past Medical History:   Diagnosis Date    Accelerating angina (HCC)     Arthritis     CAD (coronary artery disease) 5*9/14    cabg    Carotid bruit     bilateral     DM (diabetes mellitus) (HCC)     HTN (hypertension)     Hypothyroid     Mitral regurgitation     mild     Paroxysmal A-fib (HCC)     Not on coumain per pt's wishes     Pericarditis     2009        Past Surgical History:     Past Surgical History:   Procedure Laterality Date    BREAST LUMPECTOMY      CARDIAC CATHETERIZATION      2009, non critical at that time    CARDIAC PROCEDURE N/A 9/20/2024    Left heart cath /  Fingerstick    Collection Time: 10/02/24  5:46 AM   Result Value Ref Range    POC Glucose 151 (H) 65 - 105 mg/dL   Trop/Myoglobin    Collection Time: 10/02/24  9:34 AM   Result Value Ref Range    Troponin, High Sensitivity 130 (HH) 0 - 14 ng/L    Myoglobin 60 (H) 25 - 58 ng/mL   CBC    Collection Time: 10/02/24  9:34 AM   Result Value Ref Range    WBC 5.1 3.5 - 11.0 k/uL    RBC 2.16 (L) 4.0 - 5.2 m/uL    Hemoglobin 9.0 (L) 12.0 - 16.0 g/dL    Hematocrit 26.5 (L) 36 - 46 %    .3 (H) 80 - 100 fL    MCH 41.7 (H) 26 - 34 pg    MCHC 34.1 31 - 37 g/dL    RDW 15.6 (H) 11.5 - 14.9 %    Platelets 198 150 - 450 k/uL    MPV 9.6 6.0 - 12.0 fL   Basic Metabolic Panel    Collection Time: 10/02/24  9:34 AM   Result Value Ref Range    Sodium 136 135 - 144 mmol/L    Potassium 4.7 3.7 - 5.3 mmol/L    Chloride 97 (L) 98 - 107 mmol/L    CO2 27 20 - 31 mmol/L    Anion Gap 12 9 - 17 mmol/L    Glucose 195 (H) 70 - 99 mg/dL    BUN 50 (H) 8 - 23 mg/dL    Creatinine 1.7 (H) 0.5 - 0.9 mg/dL    Est, Glom Filt Rate 28 (L) >60 mL/min/1.73m2    Calcium 9.1 8.6 - 10.4 mg/dL   EKG 12 Lead    Collection Time: 10/02/24 10:47 AM   Result Value Ref Range    Ventricular Rate 58 BPM    Atrial Rate 58 BPM    P-R Interval 194 ms    QRS Duration 156 ms    Q-T Interval 498 ms    QTc Calculation (Bazett) 488 ms    P Axis 62 degrees    R Axis -20 degrees    T Axis 157 degrees   POC Glucose Fingerstick    Collection Time: 10/02/24 11:27 AM   Result Value Ref Range    POC Glucose 182 (H) 65 - 105 mg/dL       Consultations:   IP CONSULT TO DIETITIAN  IP CONSULT TO SOCIAL WORK  IP CONSULT TO INTERNAL MEDICINE  IP CONSULT TO CARDIOLOGY    Assessment :      Primary Problem  Debility    Active Hospital Problems    Diagnosis Date Noted    Debility [R53.81] 09/27/2024       Plan:     Patient has coronary artery disease, admitted recently with NSTEMI, medically managed, currently chest pain-free, on dual antiplatelet, Lipitor, Lopressor  History of atrial

## 2024-10-02 NOTE — PROGRESS NOTES
[facility]   Acute Rehabilitation Occupational Therapy Daily Treatment Note    Date: 10/2/24  Patient Name: Rossana Alcantara       Room: 2606/2606-01  MRN: [id number]  Account: [id number]   : 1930  (89 y.o.) Gender: female     Referring Practitioner: Gustavo Burrell MD  Diagnosis: Debility secondary to NSTEMI  Additional Pertinent Hx: Ms. Rossana Alcantara is a 89 y.o.  female who was admitted to [facility] on 2024 with Chest Pain and Shortness of Breath. 89-year-old female with history coronary disease status post CABG , mitral regurgitation, left bundle branch block, hypertension, hyperlipidemia, CHF ejection fraction 40 to 45%, hypothyroidism, proximal A-fib not on anticoagulation presenting with chest pain and fatigue. She was found to be tachypneic diaphoretic EKG showed left bundle branch block old concern for scarboss criteria patient also started on Zosyn due to meeting SIRS criteria. Cardiology NSTEMI status post cardiac cath 2024 occluded SVG to RCA severe disease RCA/PSI deferred due to advanced age and multiple comorbidities acute hypoxic respite failure due to acute on chronic heart failure with reduced ejection fraction, paroxysmal A-fib not on anticoagulation ischemic cardiomyopathy as well as hypertension hyperlipidemia and type 2 diabetes-continue aspirin Plavix statin Imdur and beta-blocker ejection fraction 3035% ordered LifeVest on discharge, GDMT, medical manage     Internal medicine as above troponins uptrending no more chest pain high BNP low ejection fraction pulmonary edema and rising creatinine likely explains rising troponin, acute on chronic CHF with ischemic cardiomyopathy cardiology starting low-dose IV Lasix nephro advised reduced Lasix 80 mg IV every other day, AMADO/CKD contrast versus cardiorenal ultrasound hypoechoic bilateral renal cortex suggestive of underlying parenchymal disease renal follow-up  -Creatinine increased 1.2-2.5,  needed  Short Term Goal 2: Pt will perform LB bathing/dressing SUP with Good safety and use of AE/modified techniques as needed  Short Term Goal 3: Pt will perform functional transfers/mobility SUP during self-care tasks with Good safety and use of least restrictive device  Short Term Goal 4: Pt will tolerate standing 7+ minutes during functional activity of choice SUP to improve balance/tolerance for participation in self-care tasks  Short Term Goal 5: Pt will verbalize/demonstrate Good understanding of EC/WS techniques to maximize safety and independence during self-care/functional tasks  Additional Goals?: Yes  Short Term Goal 6: Pt will actively participate in 30+ minutes of therapeutic exercise/functional activity/social participation to promote safety and independence with self-care tasks and to improve overall quality of life    Long Term Goals  Time Frame for Long Term Goals : By discharge  Long Term Goal 1: Pt will perform UB bathing/dressing (i.e don/doff life vest) Mod I with Good safety and use of AE/modified techniques as needed  Long Term Goal 2: Pt will perform LB bathing/dressing Mod I with Good safety and use of AE/modified techniques as needed  Long Term Goal 3: Pt will perform functional transfers/mobility Mod I during self-care tasks with Good safety and use of least restrictive device  Long Term Goal 4: Pt will perform tub transfer Mod I with appropriate use of AE/DME as needed  Long Term Goal 5: Pt will perform simple meal prep/light housekeeping Mod I  Long Term Goal 6: Pt will tolerate standing 10+ minutes during functional activity of choice Mod I to improve balance/tolerance for participation of self-care tasks  Long Term Goal 7: Pt will verbalize/demonstrate Good understanding of home safety/fall prevention strategies to promote safety and independence with daily activities    Plan  Occupational Therapy Plan  Times Per Week: 5-7  Times Per Day: Twice a day  Current Treatment

## 2024-10-02 NOTE — PROGRESS NOTES
Physical Medicine & Rehabilitation  Progress Note    10/2/2024 10:01 AM     CC: Ambulatory and ADL dysfunction due to debility secondary recent NSTEMI    Subjective:   Feels well.  No complaints.  Refusing LifeVest-states does not coincide with her DNR CC arrest wishes per patient.  Aware of the risks-cardiac arrhythmias, death.  Denies difficulty of bowels or bladder.  However today after therapies noted chest pressure, continues with chest pressure currently.  Denies shortness of breath nausea or vomiting    ROS:  Denies fevers, chills, sweats.  No chest pain, palpitations, lightheadedness.  Denies coughing, wheezing or shortness of breath.  Denies abdominal pain, nausea, diarrhea or constipation.  No new areas of joint pain.  Denies new areas of numbness or weakness.  Denies new anxiety or depression issues.  No new skin problems.    Rehabilitation:   PT:    Bed mobility  Rolling to Left: Supervision  Rolling to Right: Supervision  Supine to Sit: Supervision  Sit to Supine: Supervision  Scooting: Supervision  Bed Mobility Comments: Patient up in recliner.    Transfers  Sit to Stand: Stand by assistance  Stand to Sit: Stand by assistance  Bed to Chair: Stand by assistance (with RW)  Stand Pivot Transfers: Stand by assistance (with RW)  Comment: toilet transfer x1, vcs to exhale upon exerting force to stand-good return    Ambulation  Surface: Level tile, Ramp  Device: Rolling Walker  Assistance: Stand by assistance  Quality of Gait: slightly flexed at hips, downward gaze (RW adjusted for height, posture improved)  Gait Deviations: Slow Jordyn  Distance: 140' x3 reps (SpO2 95% on room air, HR 80 bpm)  Comments: cueing for forward gaze, good return  More Ambulation?: Yes  Ambulation 2  Surface - 2: level tile  Device 2: No device  Assistance 2: Contact guard assistance  Quality of Gait 2: shortened symmetrical steps, decreased arm swing, unsteady  Gait Deviations: Slow Jordyn, Decreased arm swing, Decreased step

## 2024-10-02 NOTE — PROGRESS NOTES
Nutrition Note    Pt requests regular diet be provided, and physician approved. Pt states she cannot tolerate many frruit and vegetables as \"they go right through me\". Pt takes a multivitamin/mineral supplement at home. Overall, pt states she has been eating well. Diet order to be updated. Will attempt to honor food preferences.    Lelo Shine R.D., L.D.  Phone: [phone]

## 2024-10-02 NOTE — CARE COORDINATION
ARU CASE MANAGEMENT NOTE:    Admission Date:  9/27/2024 Rossana Alcantara is a 89 y.o.  female    Admitted for : Debility [R53.81]    Spoke with patient regarding discharge plan: Patient is alert and oriented x4.Patient to discharge home. Son and daughter will stay with her. She's agreed to [facility] has agreed [facility] the patient.     Outside appointments while in ARU: none while in ARU    DME: Patient has a Zoll Life Vest which she declines to wear. No Other DME identified.     Will continue to follow for additional discharge needs.    Electronically signed by Maria F Freyer, RN on 10/2/2024 at 10:56 AM

## 2024-10-02 NOTE — PLAN OF CARE
Problem: Discharge Planning  Goal: Discharge to home or other facility with appropriate resources  10/2/2024 1417 by Ramona Real LPN  Outcome: Progressing     Problem: Safety - Adult  Goal: Free from fall injury  10/2/2024 1417 by Ramona Real LPN  Outcome: Progressing     Problem: Chronic Conditions and Co-morbidities  Goal: Patient's chronic conditions and co-morbidity symptoms are monitored and maintained or improved  10/2/2024 1417 by Ramona Real LPN  Outcome: Progressing     Problem: Skin/Tissue Integrity  Goal: Absence of new skin breakdown  Description: 1.  Monitor for areas of redness and/or skin breakdown  2.  Assess vascular access sites hourly  3.  Every 4-6 hours minimum:  Change oxygen saturation probe site  4.  Every 4-6 hours:  If on nasal continuous positive airway pressure, respiratory therapy assess nares and determine need for appliance change or resting period.  10/2/2024 1417 by Ramona Real LPN  Outcome: Progressing     Problem: ABCDS Injury Assessment  Goal: Absence of physical injury  10/2/2024 1417 by Ramona Real LPN  Outcome: Progressing     Problem: Cardiovascular - Adult  Goal: Maintains optimal cardiac output and hemodynamic stability  10/2/2024 1417 by Ramona Real LPN  Outcome: Progressing     Problem: Cardiovascular - Adult  Goal: Absence of cardiac dysrhythmias or at baseline  10/2/2024 1417 by Ramona Real LPN  Outcome: Progressing     Problem: Musculoskeletal - Adult  Goal: Return mobility to safest level of function  10/2/2024 1417 by Ramona Real LPN  Outcome: Progressing     Problem: Gastrointestinal - Adult  Goal: Maintains adequate nutritional intake  10/2/2024 1417 by Ramona Real LPN  Outcome: Progressing     Problem: Genitourinary - Adult  Goal: Absence of urinary retention  10/2/2024 1417 by Ramona Real LPN  Outcome: Progressing     Problem: Metabolic/Fluid and Electrolytes - Adult  Goal: Electrolytes maintained within normal

## 2024-10-02 NOTE — PROGRESS NOTES
Physical Therapy  Facility/Department: [facility] ACUTE REHAB  Rehabilitation Physical Therapy Treatment Note    NAME: Rossana Alcantara  : 1930 (89 y.o.)  MRN: [id number]  CODE STATUS: DNR-CCA    Date of Service: 10/2/24       Restrictions:  Restrictions/Precautions: General Precautions, Fall Risk  Required Braces or Orthoses  Other:  (Life Vest-pt refusing to wear)  Position Activity Restriction  Other position/activity restrictions: up as tolerated/ up w/ assist/ use lift equipment     SUBJECTIVE  Subjective  Subjective: Pt resting in chair upon arrival to room. States she has not been wearing lifevest since she is DNR-CCA. Pt reported feeling heaviness in chest when sitting in her chair at end of session; Dr Burrell and nursing staff was present and testing was ordered.  Pain: denies pain       OBJECTIVE  Cognition  Overall Cognitive Status: WFL  Patient affect:: Normal  Orientation  Overall Orientation Status: Within Functional Limits  Orientation Level: Oriented X4    Functional Mobility  Balance  Sitting Balance: Supervision  Standing Balance: Stand by assistance  Standing Balance  Time: pt completed dynamic balance activities without UE support x 10 minutes, required 2 seated rest breaks due to fatigue, no LOB noted, CGA for safety  Transfers  Surface: To chair with arms;From chair with arms;Standard toilet;Wheelchair  Sit to Stand  Assistance Level: Supervision (completed 3x from toilet, 5x from w/c and twice from recliner during AM/PM sessions)  Stand to Sit  Assistance Level: Supervision  Stand Pivot  Assistance Level: Stand by assist  Car Transfer  Assistance Level: Supervision      Environmental Mobility  Ambulation  Surface: Level surface;Ramp;Uneven surface  Device: Rolling walker  Distance: 150ft x 4 in AM (w/c follow for longer distances with ramp), 200ft x 2 in PM  Assistance Level: Stand by assist  Gait Deviations: Narrow base of support;Decreased step length bilateral  Stairs  Stair Height:  6''  Device: Bilateral handrails  Number of Stairs: 10  Additional Factors: Verbal cues;Reciprocal going up;Reciprocal going down  Assistance Level: Stand by assist  Curb  Curb Height: 6''  Device: Rolling walker  Number of Curbs: 1  Additional Factors: Verbal cues;Set-up  Assistance Level: Contact guard assist    PT Exercises  Resistive Exercises: seated BLE exercises, 2x 10 reps with 1#  Circulation/Endurance Exercises: nustep x 3 minutes L1, poor tolerance- pt talking continuously while utilizing and required cues for breathing- Pt requesting to stop after 3 minutes due to pain in B knees and reports tightness in chest that lasted briefly- HR 98% and SPO2 99% on RA  Pressure Relief Exercises: self adjusts  Functional Mobility Circuit Training: completed toilet transfer x 3 (twice in AM and once in PM)  Static Standing Balance Exercises: pt stood at sink 1-2 minutes performing ADLs with SBA  Dynamic Standing Balance Exercises: unsupported reaching outside RAF, weight shifting, postural ex, coordination ex, stabilization ex  Breathing Techniques: cues for breathing technique as pt tends to hold breath, vitals monitored throughout session  Standing Open/Closed Kinetic Chain Exercises: cone obstacle navigation x 40 ft without RW, CGA with one small LOB requiring Damon to correct  Exercise Equipment: seated UBE x 4 minutes in PM      Activity Tolerance: Patient limited by fatigue;Patient limited by endurance  Discharge Recommendations: Home with assist PRN    Goals  Patient Goals   Patient Goals : return home  Short Term Goals  Time Frame for Short Term Goals: 2-3 days  Short Term Goal 1: pt to demonstrate independent bed mobility for rolling and supine <> sit from a flat bed for  position change  Short Term Goal 2: pt to demonstrate transfers w/ good technique using a wheeled walker w/ supervision  Short Term Goal 3: pt to demonstrate gait 150' using a wheeled walker w/ supervision  Short Term Goal 4: pt to

## 2024-10-02 NOTE — CONSULTS
Homeless in the Last Year: No        Family History:   Family History   Problem Relation Age of Onset    Coronary Art Dis Mother     Coronary Art Dis Father        REVIEW OF SYSTEMS:    Constitutional: there has been no unanticipated weight loss. There's been No change in energy level, No change in activity level.     Eyes: No visual changes or diplopia. No scleral icterus.  ENT: No Headaches, hearing loss or vertigo. No mouth sores or sore throat.  Cardiovascular: As HPI  Respiratory: As HPI  Gastrointestinal: No abdominal pain, appetite loss, blood in stools. No change in bowel or bladder habits.  Genitourinary: No dysuria, trouble voiding, or hematuria.  Musculoskeletal:  No gait disturbance, No weakness or joint complaints.  Integumentary: No rash or pruritis.  Neurological: No headache, diplopia, change in muscle strength, numbness or tingling. No change in gait, balance, coordination, mood, affect, memory, mentation, behavior.  Psychiatric: No anxiety, or depression.  Endocrine: No temperature intolerance. No excessive thirst, fluid intake, or urination. No tremor.  Hematologic/Lymphatic: No abnormal bruising or bleeding, blood clots or swollen lymph nodes.  Allergic/Immunologic: No nasal congestion or hives.    PHYSICAL EXAM:    Physical Examination:    /68   Pulse 64   Temp 97.9 °F (36.6 °C) (Oral)   Resp 18   Ht 1.65 m (5' 4.96\")   Wt 64.1 kg (141 lb 6.4 oz)   SpO2 96%   BMI 23.56 kg/m²    Constitutional and General Appearance: alert, cooperative, no distress and appears stated age  HEENT: PERRL, no cervical lymphadenopathy. No masses palpable. Normal oral mucosa  Respiratory:  Normal excursion and expansion without use of accessory muscles  Resp Auscultation: Good respiratory effort. No for increased work of breathing. On auscultation: clear  Cardiovascular:  Heart tones are crisp and normal. regular S1 and S2. Murmurs: none  Jugular venous pulsation Normal  Abdomen:  No masses or  CONTRAST/BUBBLE/STRAIN/3D) 09/21/2024  3:28 PM (Final)    Interpretation Summary    Left Ventricle: Mildly reduced left ventricular systolic function with a visually estimated EF of 30 - 35%. EF by 2D Simpsons Biplane is 30%. Left ventricle size is normal. Increased wall thickness. Findings consistent with mild concentric hypertrophy. Normal wall motion. Abnormal diastolic function.    Aortic Valve: Trileaflet valve. Mildly calcified noncoronary cusp.    Mitral Valve: Moderate regurgitation.    Tricuspid Valve: Mild to moderate regurgitation. The estimated RVSP is 43 mmHg.    Left Atrium: Left atrium is moderately dilated.    Image quality is adequate.    CARDIAC PROCEDURE 09/20/2024  6:51 PM (Final)    Conclusion    Coronary angiogram showed severe three-vessel disease    The LIMA as well as the grafts to the diagonal and the OM are patent while the vein graft to the RCA is occluded    There is severe diffuse disease in the RCA might explain her symptoms    LVEF mildly reduced on LV gram    No aortic stenosis on catheter pullback    Considering advanced age and comorbidities I recommend medical management and pain control, if the pain is refractory to medical treatment then we will consider high risk PCI of the RCA which may require atherectomy         Labs:     CBC: No results for input(s): \"WBC\", \"HGB\", \"HCT\", \"PLT\" in the last 72 hours.  BMP:   Recent Labs     09/29/24  1005   *   K 4.5   CO2 25   BUN 67*   CREATININE 2.1*   LABGLOM 21*   GLUCOSE 202*     BNP: No results for input(s): \"BNP\" in the last 72 hours.  PT/INR: No results for input(s): \"PROTIME\", \"INR\" in the last 72 hours.  APTT:No results for input(s): \"APTT\" in the last 72 hours.  CARDIAC ENZYMES:No results for input(s): \"TROPHS\" in the last 72 hours.  FASTING LIPID PANEL:No results found for: \"HDL\", \"LDLDIRECT\", \"TRIG\"  LIVER PROFILE:No results for input(s): \"AST\", \"ALT\", \"LABALBU\" in the last 72 hours.      IMPRESSION:      CP- due to number

## 2024-10-03 LAB
GLUCOSE BLD-MCNC: 134 MG/DL (ref 65–105)
GLUCOSE BLD-MCNC: 214 MG/DL (ref 65–105)
GLUCOSE BLD-MCNC: 219 MG/DL (ref 65–105)

## 2024-10-03 PROCEDURE — 99232 SBSQ HOSP IP/OBS MODERATE 35: CPT | Performed by: PHYSICAL MEDICINE & REHABILITATION

## 2024-10-03 PROCEDURE — 6370000000 HC RX 637 (ALT 250 FOR IP)

## 2024-10-03 PROCEDURE — 1180000000 HC REHAB R&B

## 2024-10-03 PROCEDURE — 97110 THERAPEUTIC EXERCISES: CPT

## 2024-10-03 PROCEDURE — 97535 SELF CARE MNGMENT TRAINING: CPT

## 2024-10-03 PROCEDURE — 99232 SBSQ HOSP IP/OBS MODERATE 35: CPT | Performed by: INTERNAL MEDICINE

## 2024-10-03 PROCEDURE — 6370000000 HC RX 637 (ALT 250 FOR IP): Performed by: INTERNAL MEDICINE

## 2024-10-03 PROCEDURE — 6360000002 HC RX W HCPCS

## 2024-10-03 PROCEDURE — 82947 ASSAY GLUCOSE BLOOD QUANT: CPT

## 2024-10-03 PROCEDURE — 6370000000 HC RX 637 (ALT 250 FOR IP): Performed by: STUDENT IN AN ORGANIZED HEALTH CARE EDUCATION/TRAINING PROGRAM

## 2024-10-03 PROCEDURE — 97530 THERAPEUTIC ACTIVITIES: CPT

## 2024-10-03 RX ADMIN — INSULIN LISPRO 2 UNITS: 100 INJECTION, SOLUTION INTRAVENOUS; SUBCUTANEOUS at 16:38

## 2024-10-03 RX ADMIN — ATORVASTATIN CALCIUM 40 MG: 40 TABLET, FILM COATED ORAL at 20:45

## 2024-10-03 RX ADMIN — ROPINIROLE HYDROCHLORIDE 0.25 MG: 0.25 TABLET, FILM COATED ORAL at 07:39

## 2024-10-03 RX ADMIN — ISOSORBIDE MONONITRATE 60 MG: 30 TABLET, EXTENDED RELEASE ORAL at 20:44

## 2024-10-03 RX ADMIN — HEPARIN SODIUM 5000 UNITS: 5000 INJECTION INTRAVENOUS; SUBCUTANEOUS at 20:45

## 2024-10-03 RX ADMIN — ROPINIROLE HYDROCHLORIDE 0.25 MG: 0.25 TABLET, FILM COATED ORAL at 20:44

## 2024-10-03 RX ADMIN — HEPARIN SODIUM 5000 UNITS: 5000 INJECTION INTRAVENOUS; SUBCUTANEOUS at 13:54

## 2024-10-03 RX ADMIN — Medication 6 MG: at 20:44

## 2024-10-03 RX ADMIN — CLOPIDOGREL BISULFATE 75 MG: 75 TABLET ORAL at 07:36

## 2024-10-03 RX ADMIN — FUROSEMIDE 40 MG: 40 TABLET ORAL at 07:36

## 2024-10-03 RX ADMIN — ESTRADIOL 0.5 MG: 1 TABLET ORAL at 20:45

## 2024-10-03 RX ADMIN — HEPARIN SODIUM 5000 UNITS: 5000 INJECTION INTRAVENOUS; SUBCUTANEOUS at 05:51

## 2024-10-03 RX ADMIN — ASPIRIN 81 MG 81 MG: 81 TABLET ORAL at 07:36

## 2024-10-03 RX ADMIN — ISOSORBIDE MONONITRATE 60 MG: 30 TABLET, EXTENDED RELEASE ORAL at 07:35

## 2024-10-03 RX ADMIN — LEVOTHYROXINE SODIUM 125 MCG: 125 TABLET ORAL at 05:51

## 2024-10-03 RX ADMIN — SENNOSIDES AND DOCUSATE SODIUM 2 TABLET: 50; 8.6 TABLET ORAL at 07:36

## 2024-10-03 RX ADMIN — METOPROLOL SUCCINATE 25 MG: 25 TABLET, EXTENDED RELEASE ORAL at 07:36

## 2024-10-03 RX ADMIN — ROPINIROLE HYDROCHLORIDE 0.25 MG: 0.25 TABLET, FILM COATED ORAL at 13:54

## 2024-10-03 RX ADMIN — INSULIN LISPRO 2 UNITS: 100 INJECTION, SOLUTION INTRAVENOUS; SUBCUTANEOUS at 21:15

## 2024-10-03 RX ADMIN — RANOLAZINE 500 MG: 500 TABLET, EXTENDED RELEASE ORAL at 20:44

## 2024-10-03 RX ADMIN — EMPAGLIFLOZIN 10 MG: 10 TABLET, FILM COATED ORAL at 07:36

## 2024-10-03 RX ADMIN — AMLODIPINE BESYLATE 5 MG: 5 TABLET ORAL at 20:45

## 2024-10-03 RX ADMIN — ESTRADIOL 0.5 MG: 1 TABLET ORAL at 07:39

## 2024-10-03 RX ADMIN — RANOLAZINE 500 MG: 500 TABLET, EXTENDED RELEASE ORAL at 07:40

## 2024-10-03 RX ADMIN — FAMOTIDINE 20 MG: 20 TABLET, FILM COATED ORAL at 07:36

## 2024-10-03 ASSESSMENT — PAIN SCALES - GENERAL: PAINLEVEL_OUTOF10: 0

## 2024-10-03 NOTE — PROGRESS NOTES
Physical Therapy  Facility/Department: [facility] ACUTE REHAB  Rehabilitation Physical Therapy     NAME: Rossana Alcantara  : 1930 (89 y.o.)  MRN: [id number]  CODE STATUS: DNR-CCA    Date of Service: 10/3/24      Past Medical History:   Diagnosis Date    Accelerating angina (HCC)     Arthritis     CAD (coronary artery disease) 5*    cabg    Carotid bruit     bilateral     DM (diabetes mellitus) (HCC)     HTN (hypertension)     Hypothyroid     Mitral regurgitation     mild     Paroxysmal A-fib (HCC)     Not on coumain per pt's wishes     Pericarditis          Past Surgical History:   Procedure Laterality Date    BREAST LUMPECTOMY      CARDIAC CATHETERIZATION      , non critical at that time    CARDIAC PROCEDURE N/A 2024    Left heart cath / coronary angiography performed by Lebron Andres MD at [facility] CARDIAC CATH LAB    CHOLECYSTECTOMY      CORONARY ARTERY BYPASS GRAFT  2014    CABG X 6    HYSTERECTOMY (CERVIX STATUS UNKNOWN)      SHOULDER SURGERY      TONSILLECTOMY         Chart Reviewed: No  Additional Pertinent Hx: Per H & P note: Rossana Alcantara is a 89 y.o.  female who was admitted to [facility] on 2024 with Chest Pain and Shortness of Breath     89-year-old female with history coronary disease status post CABG , mitral regurgitation, left bundle branch block, hypertension, hyperlipidemia, CHF ejection fraction 40 to 45%, hypothyroidism, proximal A-fib not on anticoagulation presenting with chest pain and fatigue.  She was found to be tachypneic diaphoretic EKG showed left bundle branch block old concern for scarboss criteria patient also started on Zosyn due to meeting SIRS criteria     Cardiology NSTEMI status post cardiac cath 2024 occluded SVG to RCA severe disease RCA/PSI deferred due to advanced age and multiple comorbidities acute hypoxic respite failure due to acute on chronic heart failure with reduced ejection fraction, paroxysmal A-fib not on anticoagulation  Independent  Quality of Gait: slightly flexed at hips, downward gaze  Gait Deviations: Slow Jordyn  Distance: 500ft + walked ouside in pm around the flowers  Comments: cueing for forward gaze, good return  More Ambulation?: Yes  Ambulation 2  Surface - 2: ramp  Device 2: Rolling Walker  Assistance 2: Supervision  Quality of Gait 2: shortened symmetrical steps, decreased arm swing, unsteady  Gait Deviations: Slow Jordyn;Decreased arm swing;Decreased step height;Decreased step length  Distance: 139 ft  Comments: per patient reports not using an assistive device in everyday home or community walking, patient aware of unsteainess during gait requesting to utilize RW reporting feeling \"safer\"  Stairs/Curb  Stairs?: Yes  Stairs  # Steps : 12  Stairs Height: 6\"  Rails: Bilateral  Assistance: Modified independent   Comment: demonstrating reciprocal pattern & step-to pattern when fatigued    PT Exercises  Exercise Treatment: toilet transfer x2  A/AROM Exercises: LAQ x20 w/ 1lb, seated marches w/ 1lb x20  Resistive Exercises: hamstring curls w/ 1lb and green band x20, seated abduction w/ green band, adduction squezzes w/ ball x20 hold for 3 sec.  Static Standing Balance Exercises: pt stood at sink 1-2 minutes performing ADLs with SBA      Vitals  Pulse: 90  Heart Rate Source: Monitor  SpO2: 97 %  BP: (!) 119/54 (taken seated before activity.)  MAP (Calculated): 76    Activity Tolerance  Activity Tolerance: Patient limited by fatigue;Patient limited by endurance      Treatment Diagnosis: impaired mobility due to debilitation/ weakness S/P heart cath  Therapy Prognosis: Excellent  Decision Making: Medium Complexity  History: pt admitted due to debility  Barriers to Learning: cognition  Discharge Recommendations: Home with assist PRN  PT Equipment Recommendations  Equipment Needed:  (has wheeled walker)           GOALS  Patient Goals   Patient Goals : return home  Short Term Goals  Time Frame for Short Term Goals: 2-3

## 2024-10-03 NOTE — PROGRESS NOTES
No results for input(s): \"APTT\" in the last 72 hours.  CARDIAC ENZYMES: No results for input(s): \"CKMB\", \"CKMBINDEX\", \"TROPONINT\" in the last 72 hours.    Invalid input(s): \"CKTOTAL;3\"  FASTING LIPID PANEL:No results found for: \"CHOL\", \"HDL\", \"TRIG\"  LIVER PROFILE:   No results for input(s): \"AST\", \"ALT\", \"BILIDIR\", \"BILITOT\", \"ALKPHOS\" in the last 72 hours.    Invalid input(s): \"ALB\"       I/O (24Hr):  No intake or output data in the 24 hours ending 10/03/24 1048    Glu last 24 hour  Recent Labs     10/02/24  1127 10/02/24  1622 10/02/24  2139 10/03/24  0601   POCGLU 182* 199* 176* 134*       No results for input(s): \"CLARITYU\", \"COLORU\", \"PHUR\", \"SPECGRAV\", \"PROTEINU\", \"RBCUA\", \"BLOODU\", \"BACTERIA\", \"NITRU\", \"WBCUA\", \"LEUKOCYTESUR\", \"YEAST\", \"GLUCOSEU\", \"BILIRUBINUR\" in the last 72 hours.      Impression/Plan:    Debility secondary to recent NSTEMI in the setting of known CAD s/p CABG:  PT/OT for gait, mobility, strengthening, endurance, ADLs, and self care.   NSTEMI continue aspirin, plavix, atorvastatin, imdur, and metoprolol.    Chest pressure 10/2, patient not wearing LifeVest-appreciate cardiology input-continue aspirin Plavix statin Norvasc Imdur and added Ranexa recommended continue LifeVest though discussed with cardiology patient refusing, Metroprolol changed to long-acting not a candidate for ACE/ARB/ARN/MRA due to being CKD added Jardiance per cardiology  AMADO on CKD:  Creatinine 1.7 improved on 10/3, stable.  Will monitor.  Anemia:  Hemoglobin 9.0 on 10/2, stable.  Will monitor.  Insomnia:  Continue melatonin nightly  Mitral regurgitation  Atrial fibrillation:  Continue aspirin, metoprolol  Systolic CHF:  EF 30-35%.  Patient does not want to wear the life vest, as she has chosen to be a DNR-CCA - she expresses understanding of the risks of not wearing it-including arrhythmia/death continue meds as per cardiology see above  Restless leg-Requip  QTc 544 on 9/19 DC Zofran  Anxiety given Atarax x 1 last

## 2024-10-03 NOTE — PROGRESS NOTES
[facility]   Acute Rehabilitation Occupational Therapy Daily Treatment Note    Date: 10/3/24  Patient Name: Rossana Alcantara       Room: 2606/2606-01  MRN: [id number]  Account: [id number]   : 1930  (89 y.o.) Gender: female     Referring Practitioner: Gustavo Burrell MD  Diagnosis: Debility secondary to NSTEMI  Additional Pertinent Hx: Ms. Rossana Alcantara is a 89 y.o.  female who was admitted to [facility] on 2024 with Chest Pain and Shortness of Breath. 89-year-old female with history coronary disease status post CABG , mitral regurgitation, left bundle branch block, hypertension, hyperlipidemia, CHF ejection fraction 40 to 45%, hypothyroidism, proximal A-fib not on anticoagulation presenting with chest pain and fatigue. She was found to be tachypneic diaphoretic EKG showed left bundle branch block old concern for scarboss criteria patient also started on Zosyn due to meeting SIRS criteria. Cardiology NSTEMI status post cardiac cath 2024 occluded SVG to RCA severe disease RCA/PSI deferred due to advanced age and multiple comorbidities acute hypoxic respite failure due to acute on chronic heart failure with reduced ejection fraction, paroxysmal A-fib not on anticoagulation ischemic cardiomyopathy as well as hypertension hyperlipidemia and type 2 diabetes-continue aspirin Plavix statin Imdur and beta-blocker ejection fraction 30-35% ordered LifeVest on discharge, GDMT, medical manage     Internal medicine as above troponins uptrending no more chest pain high BNP low ejection fraction pulmonary edema and rising creatinine likely explains rising troponin, acute on chronic CHF with ischemic cardiomyopathy cardiology starting low-dose IV Lasix nephro advised reduced Lasix 80 mg IV every other day, AMADO/CKD contrast versus cardiorenal ultrasound hypoechoic bilateral renal cortex suggestive of underlying parenchymal disease renal follow-up  -Creatinine increased 1.2-2.5,  and use of AE/modified techniques as needed  Short Term Goal 2: Pt will perform LB bathing/dressing SUP with Good safety and use of AE/modified techniques as needed  Short Term Goal 3: Pt will perform functional transfers/mobility SUP during self-care tasks with Good safety and use of least restrictive device  Short Term Goal 4: Pt will tolerate standing 7+ minutes during functional activity of choice SUP to improve balance/tolerance for participation in self-care tasks  Short Term Goal 5: Pt will verbalize/demonstrate Good understanding of EC/WS techniques to maximize safety and independence during self-care/functional tasks  Additional Goals?: Yes  Short Term Goal 6: Pt will actively participate in 30+ minutes of therapeutic exercise/functional activity/social participation to promote safety and independence with self-care tasks and to improve overall quality of life    Long Term Goals  Time Frame for Long Term Goals : By discharge  Long Term Goal 1: Pt will perform UB bathing/dressing (i.e don/doff life vest) Mod I with Good safety and use of AE/modified techniques as needed  Long Term Goal 2: Pt will perform LB bathing/dressing Mod I with Good safety and use of AE/modified techniques as needed  Long Term Goal 3: Pt will perform functional transfers/mobility Mod I during self-care tasks with Good safety and use of least restrictive device  Long Term Goal 4: Pt will perform tub transfer Mod I with appropriate use of AE/DME as needed  Long Term Goal 5: Pt will perform simple meal prep/light housekeeping Mod I  Long Term Goal 6: Pt will tolerate standing 10+ minutes during functional activity of choice Mod I to improve balance/tolerance for participation of self-care tasks  Long Term Goal 7: Pt will verbalize/demonstrate Good understanding of home safety/fall prevention strategies to promote safety and independence with daily activities    Plan  Occupational Therapy Plan  Times Per Week: 5-7  Times Per Day: Twice a

## 2024-10-03 NOTE — DISCHARGE INSTR - COC
Continuity of Care Form    Patient Name: Rossana Alcantara   :  1930  MRN:  [id number]    Admit date:  2024  Discharge date:  ***    Code Status Order: DNR-CCA   Advance Directives:   Advance Care Flowsheet Documentation             Admitting Physician:  Shannan Coe MD  PCP: Modesto Ayala MD    Discharging Nurse: Aditi  Discharging Hospital Unit/Room#: 2606/2606-01  Discharging Unit Phone Number: [phone]    Emergency Contact:   Extended Emergency Contact Information  Primary Emergency Contact: Jeanna Carbnoe  Home Phone: [phone]  Relation: Child  Secondary Emergency Contact: Thomas Alcantara  Home Phone: [phone]  Mobile Phone: [phone]  Relation: Child    Past Surgical History:  Past Surgical History:   Procedure Laterality Date    BREAST LUMPECTOMY      CARDIAC CATHETERIZATION      , non critical at that time    CARDIAC PROCEDURE N/A 2024    Left heart cath / coronary angiography performed by Lebron Andres MD at [facility] CARDIAC CATH LAB    CHOLECYSTECTOMY      CORONARY ARTERY BYPASS GRAFT  2014    CABG X 6    HYSTERECTOMY (CERVIX STATUS UNKNOWN)      SHOULDER SURGERY      TONSILLECTOMY         Immunization History:   Immunization History   Administered Date(s) Administered    COVID-19, MODERNA Bivalent, (age 12y+), IM, 50 mcg/0.5 mL 2022    COVID-19, MODERNA, (age 12y+), IM, 50mcg/0.5mL 10/05/2023       Active Problems:  Patient Active Problem List   Diagnosis Code    CAD (coronary artery disease) I25.10    Angina pectoris (HCC) I20.9    HTN (hypertension) I10    Left lower lobe pneumonia J18.9    Pneumonia J18.9    Hypertension, essential I10    Hypothyroidism E03.9    ASCVD (arteriosclerotic cardiovascular disease) I25.10    Chest pain R07.9    NSTEMI (non-ST elevated myocardial infarction) (HCC) I21.4    Acute on chronic congestive heart failure (HCC) I50.9    Hyponatremia E87.1    Acute on chronic anemia D64.9    Iron deficiency E61.1    Hyperlipidemia

## 2024-10-03 NOTE — PLAN OF CARE
Problem: Discharge Planning  Goal: Discharge to home or other facility with appropriate resources  10/3/2024 0742 by Greg Dunne LPN  Outcome: Progressing  10/3/2024 0108 by Mayi Rosales LPN  Outcome: Progressing     Problem: Safety - Adult  Goal: Free from fall injury  10/3/2024 0742 by Greg Dunne LPN  Outcome: Progressing  10/3/2024 0108 by Mayi Rosales LPN  Outcome: Progressing     Problem: Chronic Conditions and Co-morbidities  Goal: Patient's chronic conditions and co-morbidity symptoms are monitored and maintained or improved  10/3/2024 0742 by Greg Dunne LPN  Outcome: Progressing  10/3/2024 0108 by Mayi Rosales LPN  Outcome: Progressing     Problem: Skin/Tissue Integrity  Goal: Absence of new skin breakdown  Description: 1.  Monitor for areas of redness and/or skin breakdown  2.  Assess vascular access sites hourly  3.  Every 4-6 hours minimum:  Change oxygen saturation probe site  4.  Every 4-6 hours:  If on nasal continuous positive airway pressure, respiratory therapy assess nares and determine need for appliance change or resting period.  10/3/2024 0742 by Greg Dunne LPN  Outcome: Progressing  10/3/2024 0108 by Mayi Rosales LPN  Outcome: Progressing     Problem: ABCDS Injury Assessment  Goal: Absence of physical injury  10/3/2024 0742 by Greg Dunne LPN  Outcome: Progressing  10/3/2024 0108 by Mayi Rosales LPN  Outcome: Progressing     Problem: Cardiovascular - Adult  Goal: Maintains optimal cardiac output and hemodynamic stability  10/3/2024 0742 by Greg Dunne LPN  Outcome: Progressing  10/3/2024 0108 by Mayi Rosales LPN  Outcome: Progressing  Goal: Absence of cardiac dysrhythmias or at baseline  10/3/2024 0742 by Greg Dunne LPN  Outcome: Progressing  10/3/2024 0108 by Mayi Rosales LPN  Outcome: Progressing     Problem: Musculoskeletal - Adult  Goal: Return mobility to safest level of  function  10/3/2024 0742 by Greg Dunne LPN  Outcome: Progressing  10/3/2024 0108 by Mayi Rosales LPN  Outcome: Progressing     Problem: Gastrointestinal - Adult  Goal: Maintains adequate nutritional intake  10/3/2024 0742 by Greg Dunne LPN  Outcome: Progressing  10/3/2024 0108 by Mayi Rosales LPN  Outcome: Progressing     Problem: Genitourinary - Adult  Goal: Absence of urinary retention  10/3/2024 0742 by Greg Dunne LPN  Outcome: Progressing  10/3/2024 0108 by Mayi Rosales LPN  Outcome: Progressing     Problem: Metabolic/Fluid and Electrolytes - Adult  Goal: Electrolytes maintained within normal limits  10/3/2024 0742 by Greg Dunne LPN  Outcome: Progressing  10/3/2024 0108 by Mayi Rosales LPN  Outcome: Progressing     Problem: Pain  Goal: Verbalizes/displays adequate comfort level or baseline comfort level  10/3/2024 0742 by Greg Dunne LPN  Outcome: Progressing  10/3/2024 0108 by Mayi Rosales LPN  Outcome: Progressing

## 2024-10-03 NOTE — PROGRESS NOTES
IN-PATIENT SERVICE  [facility] Internal Medicine    CONSULTATION / HISTORY AND PHYSICAL EXAMINATION            Date:   10/3/2024  Patient name:  Rossana Alcantara  Date of admission:  9/27/2024  1:30 PM  MRN:   [id number]  Account:  [id number]  YOB: 1930  PCP:    Modesto Ayala MD  Room:   [address] [place] [address]  Code Status:    DNR-CCA    Physician Requesting Consult: Shannan Coe MD    Reason for Consult:  medical management    Chief Complaint:   Medical comanagement    History Obtained From:     Patient medical record nursing staff    History of Present Illness:   Patient, has past medical history multiple medical problem which include coronary artery disease status post CABG back in 2014, ischemic cardiomyopathy, chronic kidney disease, paroxysmal A-fib not on anticoagulation due to anemia  Patient presented to [facility] with chest pain, diagnosed with NSTEMI, underwent cardiac cath, suggestive of diffuse disease, patient was managed conservatively due to her advanced age  Treated with iron tablets for anemia  Patient also evaluated by nephrologist with CKD  Patient feeling better today  No complaints of chest pain, shortness of breath, abdominal pain  Creatinine downtrending from 2.5-1.9  Patient is on dual antiplatelet  Only complaint patient is having his constipation  Past Medical History:     Past Medical History:   Diagnosis Date    Accelerating angina (HCC)     Arthritis     CAD (coronary artery disease) 5*9/14    cabg    Carotid bruit     bilateral     DM (diabetes mellitus) (HCC)     HTN (hypertension)     Hypothyroid     Mitral regurgitation     mild     Paroxysmal A-fib (HCC)     Not on coumain per pt's wishes     Pericarditis     2009        Past Surgical History:     Past Surgical History:   Procedure Laterality Date    BREAST LUMPECTOMY      CARDIAC CATHETERIZATION      2009, non critical at that time    CARDIAC PROCEDURE N/A 9/20/2024    Left heart cath /  dizziness, lightheadedness, numbness, pain, tingling extremities  BEHAVIOR/PSYCH:      Physical Exam:     BP (!) 118/52   Pulse 63   Temp 98.6 °F (37 °C) (Oral)   Resp 16   Ht 1.65 m (5' 4.96\")   Wt 64.1 kg (141 lb 6.4 oz)   SpO2 98%   BMI 23.56 kg/m²   Temp (24hrs), Av.6 °F (37 °C), Min:98.6 °F (37 °C), Max:98.6 °F (37 °C)    Recent Labs     10/02/24  1127 10/02/24  1622 10/02/24  2139 10/03/24  0601   POCGLU 182* 199* 176* 134*     No intake or output data in the 24 hours ending 10/03/24 1540      General Appearance:  alert, well appearing, and in no acute distress  Mental status: oriented to person, place, and time with normal affect  Head:  normocephalic, atraumatic.  Eye: no icterus, redness, pupils equal and reactive, extraocular eye movements intact, conjunctiva clear  Ear: normal external ear, no discharge, hearing intact  Nose:  no drainage noted  Mouth: mucous membranes moist  Neck: supple, no carotid bruits, thyroid not palpable  Lungs: Bilateral equal air entry, clear to ausculation, no wheezing, rales or rhonchi, normal effort  Cardiovascular: normal rate, regular rhythm, no murmur, gallop, rub.  Abdomen: Soft, nontender, nondistended, normal bowel sounds, no hepatomegaly or splenomegaly  Neurologic: There are no new focal motor or sensory deficits, normal muscle tone and bulk, no abnormal sensation, normal speech, cranial nerves II through XII grossly intact  Skin: No gross lesions, rashes, bruising or bleeding on exposed skin area  Extremities:  peripheral pulses palpable, no pedal edema or calf pain with palpation  Psych:     Investigations:      Laboratory Testing:  Recent Results (from the past 24 hour(s))   POC Glucose Fingerstick    Collection Time: 10/02/24  4:22 PM   Result Value Ref Range    POC Glucose 199 (H) 65 - 105 mg/dL   POC Glucose Fingerstick    Collection Time: 10/02/24  9:39 PM   Result Value Ref Range    POC Glucose 176 (H) 65 - 105 mg/dL   POC Glucose Fingerstick

## 2024-10-03 NOTE — PLAN OF CARE
Problem: Discharge Planning  Goal: Discharge to home or other facility with appropriate resources  10/3/2024 0108 by Mayi Rosales LPN  Outcome: Progressing   Patient is participating in therapy and is active in place of care     Problem: Safety - Adult  Goal: Free from fall injury  10/3/2024 0108 by Mayi Rosales LPN  Outcome: Progressing   Patient has remained safe and free from falls     Problem: Skin/Tissue Integrity  Goal: Absence of new skin breakdown  Description: 1.  Monitor for areas of redness and/or skin breakdown  2.  Assess vascular access sites hourly  3.  Every 4-6 hours minimum:  Change oxygen saturation probe site  4.  Every 4-6 hours:  If on nasal continuous positive airway pressure, respiratory therapy assess nares and determine need for appliance change or resting period.  10/3/2024 0108 by Mayi Rosales LPN  Outcome: Progressing   Patient is free of skin breakdown and has no new wounds     Problem: Pain  Goal: Verbalizes/displays adequate comfort level or baseline comfort level  10/3/2024 0108 by Mayi Rosales LPN  Outcome: Progressing   Patient is free of pain

## 2024-10-03 NOTE — PROGRESS NOTES
Spiritual Health History and Assessment/Progress Note  [facility]    (P) Spiritual/Emotional Needs,  ,  ,      Name: Rossana Alcantara MRN: [id number]    Age: 89 y.o.     Sex: female   Language: English   Yazidi: Denominational   Debility     Date: 10/2/2024            Total Time Calculated: (P) 15 min              Spiritual Assessment continued in [facility] ACUTE REHAB        Referral/Consult From: (P) Rounding   Encounter Overview/Reason: (P) Spiritual/Emotional Needs  Service Provided For: (P) Patient and family together (Son & Daughter-in-Law Present)    Taylor, Belief, Meaning:   Patient identifies as spiritual, is connected with a taylor tradition or spiritual practice, and has beliefs or practices that help with coping during difficult times  Family/Friends identify as spiritual, are connected with a taylor tradition or spiritual practice, and have beliefs or practices that help with coping during difficult times      Importance and Influence:  Patient has spiritual/personal beliefs that influence decisions regarding their health  Family/Friends have spiritual/personal beliefs that influence decisions regarding the patient's health    Community:  Patient is connected with a spiritual community  Family/Friends are connected with a spiritual community:    Assessment and Plan of Care:     Patient Interventions include: Provided sacramental/Yazdanism ritual  Family/Friends Interventions include: Provided sacramental/Yazdanism ritual    Patient Plan of Care: No spiritual needs identified for follow-up and Spiritual Care available upon further referral  Family/Friends Plan of Care: Spiritual Care available upon further referral and No future visits per patient/family request    Electronically signed by Chaplain Silvio on 10/2/2024 at 8:07 PM       10/02/24 2001   Encounter Summary   Encounter Overview/Reason Spiritual/Emotional Needs   Service Provided For Patient and family together  (Son &  Daughter-in-Law Present)   Referral/Consult From [facility]   Support System Children;Family members   Last Encounter  10/02/24   Complexity of Encounter Low   Begin Time 1945   End Time  2000   Total Time Calculated 15 min   Spiritual/Emotional needs   Type Spiritual Support   Assessment/Intervention/Outcome   Assessment Calm;Coping;Hopeful;Peaceful   Intervention Active listening   Outcome Acceptance;Connection/Belonging;Engaged in conversation;Expressed Gratitude;Expressed feelings of Brooke, Peace and/or Love   Plan and Referrals   Plan/Referrals Continue Support (comment)

## 2024-10-04 VITALS
RESPIRATION RATE: 16 BRPM | TEMPERATURE: 97.7 F | HEIGHT: 65 IN | BODY MASS INDEX: 23.56 KG/M2 | HEART RATE: 67 BPM | DIASTOLIC BLOOD PRESSURE: 53 MMHG | WEIGHT: 141.4 LBS | SYSTOLIC BLOOD PRESSURE: 144 MMHG | OXYGEN SATURATION: 98 %

## 2024-10-04 LAB — GLUCOSE BLD-MCNC: 146 MG/DL (ref 65–105)

## 2024-10-04 PROCEDURE — 6360000002 HC RX W HCPCS

## 2024-10-04 PROCEDURE — 97530 THERAPEUTIC ACTIVITIES: CPT

## 2024-10-04 PROCEDURE — 6370000000 HC RX 637 (ALT 250 FOR IP): Performed by: STUDENT IN AN ORGANIZED HEALTH CARE EDUCATION/TRAINING PROGRAM

## 2024-10-04 PROCEDURE — 97116 GAIT TRAINING THERAPY: CPT

## 2024-10-04 PROCEDURE — 6370000000 HC RX 637 (ALT 250 FOR IP): Performed by: INTERNAL MEDICINE

## 2024-10-04 PROCEDURE — 97110 THERAPEUTIC EXERCISES: CPT

## 2024-10-04 PROCEDURE — 82947 ASSAY GLUCOSE BLOOD QUANT: CPT

## 2024-10-04 PROCEDURE — 6370000000 HC RX 637 (ALT 250 FOR IP)

## 2024-10-04 PROCEDURE — 99239 HOSP IP/OBS DSCHRG MGMT >30: CPT | Performed by: PHYSICAL MEDICINE & REHABILITATION

## 2024-10-04 RX ORDER — METOPROLOL SUCCINATE 25 MG/1
25 TABLET, EXTENDED RELEASE ORAL DAILY
Qty: 30 TABLET | Refills: 3 | Status: SHIPPED | OUTPATIENT
Start: 2024-10-04

## 2024-10-04 RX ORDER — LEVOTHYROXINE SODIUM 125 UG/1
125 TABLET ORAL DAILY
Qty: 30 TABLET | Refills: 3 | Status: CANCELLED | OUTPATIENT
Start: 2024-10-04

## 2024-10-04 RX ORDER — RANOLAZINE 500 MG/1
500 TABLET, EXTENDED RELEASE ORAL 2 TIMES DAILY
Qty: 60 TABLET | Refills: 3 | Status: SHIPPED | OUTPATIENT
Start: 2024-10-04

## 2024-10-04 RX ORDER — ATORVASTATIN CALCIUM 40 MG/1
40 TABLET, FILM COATED ORAL DAILY
Qty: 30 TABLET | Refills: 3 | Status: CANCELLED | OUTPATIENT
Start: 2024-10-04

## 2024-10-04 RX ORDER — FAMOTIDINE 20 MG/1
20 TABLET, FILM COATED ORAL DAILY
Qty: 30 TABLET | Refills: 0 | Status: SHIPPED | OUTPATIENT
Start: 2024-10-04 | End: 2024-11-03

## 2024-10-04 RX ORDER — LEVOTHYROXINE SODIUM 125 UG/1
125 TABLET ORAL DAILY
Qty: 30 TABLET | Refills: 2 | Status: SHIPPED | OUTPATIENT
Start: 2024-10-04

## 2024-10-04 RX ORDER — ATORVASTATIN CALCIUM 40 MG/1
40 TABLET, FILM COATED ORAL DAILY
Qty: 30 TABLET | Refills: 3 | Status: SHIPPED | OUTPATIENT
Start: 2024-10-04

## 2024-10-04 RX ORDER — CLOPIDOGREL BISULFATE 75 MG/1
75 TABLET ORAL DAILY
Qty: 30 TABLET | Refills: 2 | Status: SHIPPED | OUTPATIENT
Start: 2024-10-04

## 2024-10-04 RX ORDER — ISOSORBIDE MONONITRATE 60 MG/1
60 TABLET, EXTENDED RELEASE ORAL 2 TIMES DAILY
Qty: 30 TABLET | Refills: 3 | Status: SHIPPED | OUTPATIENT
Start: 2024-10-04

## 2024-10-04 RX ORDER — AMLODIPINE BESYLATE 5 MG/1
5 TABLET ORAL NIGHTLY
Qty: 30 TABLET | Refills: 3 | Status: SHIPPED | OUTPATIENT
Start: 2024-10-04

## 2024-10-04 RX ORDER — FUROSEMIDE 40 MG
40 TABLET ORAL DAILY
Qty: 60 TABLET | Refills: 3 | Status: SHIPPED | OUTPATIENT
Start: 2024-10-04

## 2024-10-04 RX ADMIN — FUROSEMIDE 40 MG: 40 TABLET ORAL at 08:03

## 2024-10-04 RX ADMIN — HEPARIN SODIUM 5000 UNITS: 5000 INJECTION INTRAVENOUS; SUBCUTANEOUS at 05:39

## 2024-10-04 RX ADMIN — METOPROLOL SUCCINATE 25 MG: 25 TABLET, EXTENDED RELEASE ORAL at 08:02

## 2024-10-04 RX ADMIN — ROPINIROLE HYDROCHLORIDE 0.25 MG: 0.25 TABLET, FILM COATED ORAL at 08:06

## 2024-10-04 RX ADMIN — EMPAGLIFLOZIN 10 MG: 10 TABLET, FILM COATED ORAL at 08:02

## 2024-10-04 RX ADMIN — ASPIRIN 81 MG 81 MG: 81 TABLET ORAL at 08:02

## 2024-10-04 RX ADMIN — FAMOTIDINE 20 MG: 20 TABLET, FILM COATED ORAL at 08:00

## 2024-10-04 RX ADMIN — LEVOTHYROXINE SODIUM 125 MCG: 125 TABLET ORAL at 05:39

## 2024-10-04 RX ADMIN — ISOSORBIDE MONONITRATE 60 MG: 30 TABLET, EXTENDED RELEASE ORAL at 08:02

## 2024-10-04 RX ADMIN — RANOLAZINE 500 MG: 500 TABLET, EXTENDED RELEASE ORAL at 08:06

## 2024-10-04 RX ADMIN — ESTRADIOL 0.5 MG: 1 TABLET ORAL at 08:06

## 2024-10-04 RX ADMIN — CLOPIDOGREL BISULFATE 75 MG: 75 TABLET ORAL at 08:02

## 2024-10-04 NOTE — PROGRESS NOTES
ACUTE INPATIENT REHABILITATION DISCHARGE  [facility]    Patient Name: Rossana Alcantara  MRN: [id number]     Patient discharged in stable condition as per order of attending physician.     AVS provided by nurse at time of discharge, which includes all necessary medical information pertaining to the patients current course of illness, treatment, medications, post-discharge goals of care, and treatment preferences.     Provision of Current Reconciled Medication List to Patient at Discharge   Indicate the route(s) of transmission of the current reconciled medication list to the patient/family/caregiver. Electronic Health Record (e.g. electronic access to patient portal)     Availability of \"My Chart\" offered to patient as a tool for updated health record.  Steps for activation discussed with patient as mentioned on AVS.      Patient/responsible party verbalize understanding of discharge plan and are in agreement with goal/plan/treatment preferences.     Belongings including Glasses, clothes shoes  sent with patient/responsible party.      Home medications sent home with patient/responsible party N/A    Car Transfer   Level of assistance required for patient transfer into vehicle:   INDEPENDENT: Patient completes the activity by him/herself with no assistance from a helper     High-Risk Drug Classes: Use and Indication   Check if the patient is taking any medications by pharmacological classification If yes, check if there is an indication noted for all meds in the drug class   Antipsychotic No If yes: indication noted?  [] If no indication noted, follow up with provider for order clarification   Anticoagulant No If yes: indication noted?  []    Antibiotic No If yes: indication noted?  []    Opioid No If yes: indication noted?  []    Antiplatelet Yes If yes: indication noted?  []    Hypoglycemic (Including Insulin) yes If yes: indication noted?  []        Pain Assessment   Over the past 5 days, how much of the

## 2024-10-04 NOTE — PLAN OF CARE
Problem: Discharge Planning  Goal: Discharge to home or other facility with appropriate resources  Outcome: Progressing     Problem: Safety - Adult  Goal: Free from fall injury  Outcome: Progressing     Problem: Chronic Conditions and Co-morbidities  Goal: Patient's chronic conditions and co-morbidity symptoms are monitored and maintained or improved  Outcome: Progressing     Problem: Skin/Tissue Integrity  Goal: Absence of new skin breakdown  Description: 1.  Monitor for areas of redness and/or skin breakdown  2.  Assess vascular access sites hourly  3.  Every 4-6 hours minimum:  Change oxygen saturation probe site  4.  Every 4-6 hours:  If on nasal continuous positive airway pressure, respiratory therapy assess nares and determine need for appliance change or resting period.  Outcome: Progressing     Problem: ABCDS Injury Assessment  Goal: Absence of physical injury  Outcome: Progressing     Problem: Cardiovascular - Adult  Goal: Maintains optimal cardiac output and hemodynamic stability  Outcome: Progressing     Problem: Cardiovascular - Adult  Goal: Absence of cardiac dysrhythmias or at baseline  Outcome: Progressing     Problem: Musculoskeletal - Adult  Goal: Return mobility to safest level of function  Outcome: Progressing     Problem: Gastrointestinal - Adult  Goal: Maintains adequate nutritional intake  Outcome: Progressing     Problem: Genitourinary - Adult  Goal: Absence of urinary retention  Outcome: Progressing     Problem: Metabolic/Fluid and Electrolytes - Adult  Goal: Electrolytes maintained within normal limits  Outcome: Progressing     Problem: Pain  Goal: Verbalizes/displays adequate comfort level or baseline comfort level  Outcome: Progressing

## 2024-10-04 NOTE — PROGRESS NOTES
[facility]   Acute Rehabilitation Occupational Therapy Daily Treatment Note    Date: 10/4/24  Patient Name: Rossana Alcantara       Room: 2606/2606-01  MRN: [id number]  Account: [id number]   : 1930  (89 y.o.) Gender: female     Referring Practitioner: Gustavo Burrell MD  Diagnosis: Debility secondary to NSTEMI  Additional Pertinent Hx: Ms. Rossana Alcantara is a 89 y.o.  female who was admitted to [facility] on 2024 with Chest Pain and Shortness of Breath. 89-year-old female with history coronary disease status post CABG , mitral regurgitation, left bundle branch block, hypertension, hyperlipidemia, CHF ejection fraction 40 to 45%, hypothyroidism, proximal A-fib not on anticoagulation presenting with chest pain and fatigue. She was found to be tachypneic diaphoretic EKG showed left bundle branch block old concern for scarboss criteria patient also started on Zosyn due to meeting SIRS criteria. Cardiology NSTEMI status post cardiac cath 2024 occluded SVG to RCA severe disease RCA/PSI deferred due to advanced age and multiple comorbidities acute hypoxic respite failure due to acute on chronic heart failure with reduced ejection fraction, paroxysmal A-fib not on anticoagulation ischemic cardiomyopathy as well as hypertension hyperlipidemia and type 2 diabetes-continue aspirin Plavix statin Imdur and beta-blocker ejection fraction 3035% ordered LifeVest on discharge, GDMT, medical manage     Internal medicine as above troponins uptrending no more chest pain high BNP low ejection fraction pulmonary edema and rising creatinine likely explains rising troponin, acute on chronic CHF with ischemic cardiomyopathy cardiology starting low-dose IV Lasix nephro advised reduced Lasix 80 mg IV every other day, AMADO/CKD contrast versus cardiorenal ultrasound hypoechoic bilateral renal cortex suggestive of underlying parenchymal disease renal follow-up  -Creatinine increased 1.2-2.5,  paroxysmal A-fib did not start anticoagulation due to high cost, plan to consider acute hypoxic respiratory failure no PE likely pulmonary edema, anemia started iron, no hypertensive medication due to orthostatic hypotension diabetes. Nephrology AMADO second nephrotoxic ATN with contrast exposure creatinine 2.5 seems to plateau baseline 1.2-1.4 decrease Lasix. Pt admitted to ARU on 9/27/2024.    Treatment Diagnosis: Impaired self-care status    Past Medical History:  has a past medical history of Accelerating angina (HCC), Arthritis, CAD (coronary artery disease), Carotid bruit, DM (diabetes mellitus) (HCC), HTN (hypertension), Hypothyroid, Mitral regurgitation, Paroxysmal A-fib (HCC), and Pericarditis.    Past Surgical History:   has a past surgical history that includes Cardiac catheterization; Cholecystectomy; Hysterectomy; shoulder surgery; Tonsillectomy; Breast lumpectomy; Coronary artery bypass graft (5/9/2014); and Cardiac procedure (N/A, 9/20/2024).    Restrictions  Restrictions/Precautions  Restrictions/Precautions: General Precautions, Fall Risk  Required Braces or Orthoses?: Yes  Implants present? : Metal implants  Required Braces or Orthoses  Other:  (LifeVest-pt refusing wear)  Position Activity Restriction  Other position/activity restrictions: up as tolerated/ up w/ assist/ use lift equipment        Vitals      Subjective  Subjective  Subjective: \"I'm ready to go!\"  Pain Assessment  Pain Assessment: None - Denies Pain    Objective  Cognition  Overall Orientation Status: Within Functional Limits  Orientation Level: Oriented X4    Cognition  Overall Cognitive Status: WFL  Patient affect:: Normal    Activities of Daily Living  Feeding  Assistance Level: Independent  Skilled Clinical Factors: Per pt report.    Grooming/Oral Hygiene  Assistance Level: Modified independent  Skilled Clinical Factors: standing at sink to wash hands      Toileting  Assistance Level: Modified independent  Skilled Clinical

## 2024-10-04 NOTE — CARE COORDINATION
[facility] Acute Inpatient Rehabilitation  Case Management Discharge Note    Discharge Disposition: Home Alone (daughter and CHANA to stay for a week or two after discharge)    Discharge Transportation Method: family car ,  Time: tbd    IMM Letter Status: Patient/Responsible Party agreeable with discharge: Second Signature Obtained, Patient/Responsible Party offered four hours to make informed decision regarding appeal process - Completed Letter Placed in Patient Chart    Home Healthcare Services: Company: [facility]    Outpatient Therapy Services: Not Applicable    DME: No DME Needs Identified    Current reconciled medication list sent to subsequent provider via: Electronic Health Record      Patient Health Questionnaire-9 (PHQ-2 to 9)   Over the last 2 weeks, how often have you been bothered by any of the following problems?    1. Little Interest or pleasure in doing things?   Never or 1 Day - Score 0    2. Feeling down, depressed or hopeless?   Never or 1 Day - Score 0    3. Trouble falling or staying asleep, or sleeping too much?   Never or 1 Day - Score 0    4. Feeling tired or having little energy?   Never or 1 Day - Score 0    5. Poor appetite or overeating?   Never or 1 Day - Score 0    6. Feeling bad about yourself-or that you are a failure or have let yourself or your family down?   Never or 1 Day - Score 0    7. Trouble concentrating on things, such as reading the newspaper or watching television?    Never or 1 Day - Score 0    8. Moving or speaking so slowly that other people could have noticed? Or the opposite-being so fidgety or restless that you have been moving around a lot more than usual?  Never or 1 Day - Score 0    9. Thoughts that you would be better off dead or of hurting yourself in some way?   Never or 1 Day - Score 0    Total Score: 0  If score is above 15, Notify PM&R Physician    If you checked off any problems, how difficult have these problems made it for you to do  your work, take care of things at home, or get along with other people?    Not Applicable - No Problems Identified       Social Isolation     How often do you feel lonely or isolated from those around you?    Never       Access To Transportation     2.In the past six months to a year, has lack of transportation kept you from medical appointments or from getting your medications?”     No    3.In the past six months to a year, has lack of transportation kept you from non-medical meetings, appointments, work, or from getting things that you need?     No       Miroslava Goldstein RN  Acute Inpatient Rehabilitation Case Management Department  Ph:[phone] Fax: [phone]

## 2024-10-04 NOTE — DISCHARGE SUMMARY
atorvastatin, imdur, and metoprolol.  Echo showed EF 30-35% and she was fitted with a life vest.  Nephrology was consulted for AMADO on CKD.    Inpatient Rehabilitation Course:   Rossana Alcantara was admitted to inpatient rehabilitation on 9/27/2024.    Rehab course:  Patient progressed well and benefited from acute inpatient rehabilitation.  Patient advised for LifeVest adamantly refused.  She is aware of the risks and benefits of the LifeVest.  This was also discussed with family as well .  She noted that she is DNR CC arrest and did not see a point.  She did have an episode of chest pressure in 10/2 was seen by cardiology medications were adjusted Ranexa and Jardiance was added to her aspirin and Plavix and statin .  Acute kidney injury was improving.  Anemia was stable..  As noted she has ejection fraction 30 to 35%, recommended LifeVest however patient refused.  She is noted have QTc of 544.  Blood pressure was controlled.  Glucose mildly elevated will need follow-up with PCP    Discharge status:  fair    Discharge Dispostiion:   Home with Home Health Care      The patient participated in an aggressive multidisciplinary inpatient rehabilitation program involving 3 hours per day, 5 days per week of rehabilitation.  Patient benefited from inpatient rehab and was discharged in good and stable condition.    Consults:   IM, Card      Significant Diagnostics:   CBC:   Lab Results   Component Value Date/Time    WBC 5.1 10/02/2024 09:34 AM    RBC 2.16 10/02/2024 09:34 AM    HGB 9.0 10/02/2024 09:34 AM    HCT 26.5 10/02/2024 09:34 AM    .3 10/02/2024 09:34 AM    MCH 41.7 10/02/2024 09:34 AM    MCHC 34.1 10/02/2024 09:34 AM    RDW 15.6 10/02/2024 09:34 AM     10/02/2024 09:34 AM    MPV 9.6 10/02/2024 09:34 AM     CMP:    Lab Results   Component Value Date/Time     10/02/2024 09:34 AM    K 4.7 10/02/2024 09:34 AM    CL 97 10/02/2024 09:34 AM    CO2 27 10/02/2024 09:34 AM    BUN 50 10/02/2024 09:34 AM    estradiol 0.5 MG tablet  Commonly known as: ESTRACE  Take 1 tablet by mouth in the morning and at bedtime     famotidine 20 MG tablet  Commonly known as: PEPCID  Take 1 tablet by mouth daily     isosorbide mononitrate 60 MG extended release tablet  Commonly known as: IMDUR  Take 1 tablet by mouth in the morning and at bedtime     melatonin 5 MG Tabs tablet  Take 1 tablet by mouth nightly     nitroGLYCERIN 0.4 MG SL tablet  Commonly known as: NITROSTAT  Place 1 tablet under the tongue every 5 minutes as needed for Chest pain up to max of 3 total doses. If no relief after 1 dose, call 911.     polyethylene glycol 17 g packet  Commonly known as: GLYCOLAX  Take 1 packet by mouth daily     rOPINIRole 0.25 MG tablet  Commonly known as: REQUIP  Take 1 tablet by mouth 3 times daily     therapeutic multivitamin-minerals tablet            STOP taking these medications      allopurinol 300 MG tablet  Commonly known as: ZYLOPRIM     colestipol 1 g tablet  Commonly known as: Colestid     metoprolol tartrate 25 MG tablet  Commonly known as: LOPRESSOR     Rosalind-Bid Probiotic Tabs     sennosides-docusate sodium 8.6-50 MG tablet  Commonly known as: SENOKOT-S     Torsemide 40 MG Tabs               Where to Get Your Medications        These medications were sent to [id number] - [address] -  [phone] - F [phone]  [address] [place], [place] [address]      Phone: [phone]   amLODIPine 5 MG tablet  atorvastatin 40 MG tablet  clopidogrel 75 MG tablet  empagliflozin 10 MG tablet  famotidine 20 MG tablet  furosemide 40 MG tablet  isosorbide mononitrate 60 MG extended release tablet  levothyroxine 125 MCG tablet  metoprolol succinate 25 MG extended release tablet  ranolazine 500 MG extended release tablet       You can get these medications from any pharmacy    Bring a paper prescription for each of these medications  Augusto Choe MD

## 2024-10-04 NOTE — PROGRESS NOTES
CLINICAL PHARMACY NOTE: MEDS TO BEDS    Total # of Prescriptions Filled: 7   The following medications were delivered to the patient:  Metoprolol Succinate ER 25mg  Clopidogrel 75mg  Furosemide 40mg  Famotidine 20mg  Atorvastatin 40mg  Amlodipine 5mg  Isosorbide Mono ER 60mg    Additional Documentation:  Delivered medications to patients room

## 2024-10-04 NOTE — PLAN OF CARE
Problem: Discharge Planning  Goal: Discharge to home or other facility with appropriate resources  10/4/2024 1038 by Eula Guillermo RN  Outcome: Progressing  10/4/2024 0114 by Pamela Hernandez LPN  Outcome: Progressing  Flowsheets (Taken 10/3/2024 2045)  Discharge to home or other facility with appropriate resources:   Identify barriers to discharge with patient and caregiver   Arrange for needed discharge resources and transportation as appropriate   Identify discharge learning needs (meds, wound care, etc)   Refer to discharge planning if patient needs post-hospital services based on physician order or complex needs related to functional status, cognitive ability or social support system     Problem: Safety - Adult  Goal: Free from fall injury  10/4/2024 1038 by Eula Guillermo RN  Outcome: Progressing  10/4/2024 0114 by Pamela Hernandez LPN  Outcome: Progressing     Problem: Skin/Tissue Integrity  Goal: Absence of new skin breakdown  Description: 1.  Monitor for areas of redness and/or skin breakdown  2.  Assess vascular access sites hourly  3.  Every 4-6 hours minimum:  Change oxygen saturation probe site  4.  Every 4-6 hours:  If on nasal continuous positive airway pressure, respiratory therapy assess nares and determine need for appliance change or resting period.  10/4/2024 1038 by Eula Guillermo RN  Outcome: Progressing  10/4/2024 0114 by Pamela Hernandez LPN  Outcome: Progressing     Problem: Cardiovascular - Adult  Goal: Maintains optimal cardiac output and hemodynamic stability  10/4/2024 1038 by Eula Guillermo RN  Outcome: Progressing  10/4/2024 0114 by Pamela Hernandez LPN  Outcome: Progressing  Goal: Absence of cardiac dysrhythmias or at baseline  10/4/2024 1038 by Eula Guillermo RN  Outcome: Progressing  10/4/2024 0114 by Pamela Hernandez LPN  Outcome: Progressing

## 2024-10-04 NOTE — PROGRESS NOTES
Physical Therapy  Facility/Department: [facility] ACUTE REHAB      NAME: Rossana Alcantara  : 1930 (89 y.o.)  MRN: [id number]  CODE STATUS: Prior    Date of Service: 10/4/24      Past Medical History:   Diagnosis Date    Accelerating angina (HCC)     Arthritis     CAD (coronary artery disease)     cabg    Carotid bruit     bilateral     DM (diabetes mellitus) (HCC)     HTN (hypertension)     Hypothyroid     Mitral regurgitation     mild     Paroxysmal A-fib (HCC)     Not on coumain per pt's wishes     Pericarditis          Past Surgical History:   Procedure Laterality Date    BREAST LUMPECTOMY      CARDIAC CATHETERIZATION      , non critical at that time    CARDIAC PROCEDURE N/A 2024    Left heart cath / coronary angiography performed by Lebron Andres MD at [facility] CARDIAC CATH LAB    CHOLECYSTECTOMY      CORONARY ARTERY BYPASS GRAFT  2014    CABG X 6    HYSTERECTOMY (CERVIX STATUS UNKNOWN)      SHOULDER SURGERY      TONSILLECTOMY         Chart Reviewed: No  Additional Pertinent Hx: Per H & P note: Rossana Alcantara is a 89 y.o.  female who was admitted to [facility] on 2024 with Chest Pain and Shortness of Breath     89-year-old female with history coronary disease status post CABG , mitral regurgitation, left bundle branch block, hypertension, hyperlipidemia, CHF ejection fraction 40 to 45%, hypothyroidism, proximal A-fib not on anticoagulation presenting with chest pain and fatigue.  She was found to be tachypneic diaphoretic EKG showed left bundle branch block old concern for scarboss criteria patient also started on Zosyn due to meeting SIRS criteria     Cardiology NSTEMI status post cardiac cath 2024 occluded SVG to RCA severe disease RCA/PSI deferred due to advanced age and multiple comorbidities acute hypoxic respite failure due to acute on chronic heart failure with reduced ejection fraction, paroxysmal A-fib not on anticoagulation ischemic cardiomyopathy as well as

## 2024-10-04 NOTE — PROGRESS NOTES
Physical Medicine & Rehabilitation  Progress Note    10/4/2024 10:01 AM     CC: Ambulatory and ADL dysfunction due to debility secondary recent NSTEMI    Subjective:   Feels well.  No complaints.  Feels better today.  Ready for discharge    ROS:  Denies fevers, chills, sweats.  No chest pain, palpitations, lightheadedness.  Denies coughing, wheezing or shortness of breath.  Denies abdominal pain, nausea, diarrhea or constipation.  No new areas of joint pain.  Denies new areas of numbness or weakness.  Denies new anxiety or depression issues.  No new skin problems.    Rehabilitation:   PT:    Bed mobility  Rolling to Left: Modified independent  Rolling to Right: Modified independent  Supine to Sit: Modified independent  Sit to Supine: Modified independent  Scooting: Modified independent  Bed Mobility Comments: Patient up in recliner.    Transfers  Sit to Stand: Supervision  Stand to Sit: Supervision  Bed to Chair: Supervision  Stand Pivot Transfers: Supervision  Comment: toilet transfer x1, vcs to exhale upon exerting force to stand-good return    Ambulation  Surface: Level tile  Device: Rolling Walker  Assistance: Modified Independent  Quality of Gait: slightly flexed at hips, downward gaze  Gait Deviations: Slow Jordyn  Distance: 500ft + walked ouside in pm around the flowers  Comments: cueing for forward gaze, good return  More Ambulation?: Yes  Ambulation 2  Surface - 2: ramp  Device 2: Rolling Walker  Assistance 2: Supervision  Quality of Gait 2: shortened symmetrical steps, decreased arm swing, unsteady  Gait Deviations: Slow Jordyn, Decreased arm swing, Decreased step height, Decreased step length  Distance: 139 ft  Comments: per patient reports not using an assistive device in everyday home or community walking, patient aware of unsteainess during gait requesting to utilize RW reporting feeling \"safer\"  Ambulation 3  Surface - 3: level tile  Device 3: Rollator  Assistance 3: Stand by assistance  Quality of

## 2024-10-05 LAB
EKG ATRIAL RATE: 58 BPM
EKG P AXIS: 62 DEGREES
EKG P-R INTERVAL: 194 MS
EKG Q-T INTERVAL: 498 MS
EKG QRS DURATION: 156 MS
EKG QTC CALCULATION (BAZETT): 488 MS
EKG R AXIS: -20 DEGREES
EKG T AXIS: 157 DEGREES
EKG VENTRICULAR RATE: 58 BPM

## 2024-10-05 PROCEDURE — 93010 ELECTROCARDIOGRAM REPORT: CPT | Performed by: INTERNAL MEDICINE

## 2024-12-13 ENCOUNTER — HOSPITAL ENCOUNTER (INPATIENT)
Age: 88
LOS: 1 days | Discharge: HOME | DRG: 322 | End: 2024-12-14
Attending: STUDENT IN AN ORGANIZED HEALTH CARE EDUCATION/TRAINING PROGRAM | Admitting: STUDENT IN AN ORGANIZED HEALTH CARE EDUCATION/TRAINING PROGRAM
Payer: MEDICARE

## 2024-12-13 DIAGNOSIS — I25.119 CORONARY ARTERY DISEASE WITH ANGINA PECTORIS, UNSPECIFIED VESSEL OR LESION TYPE, UNSPECIFIED WHETHER NATIVE OR TRANSPLANTED HEART (HCC): ICD-10-CM

## 2024-12-13 DIAGNOSIS — Z95.5 STATUS POST CORONARY ARTERY STENT PLACEMENT: Primary | ICD-10-CM

## 2024-12-13 PROBLEM — Z95.820 S/P ANGIOPLASTY WITH STENT: Status: ACTIVE | Noted: 2024-12-13

## 2024-12-13 PROBLEM — Z98.61 POST PTCA: Status: ACTIVE | Noted: 2024-12-13

## 2024-12-13 LAB
ANION GAP SERPL CALCULATED.3IONS-SCNC: 13 MMOL/L (ref 9–16)
BUN BLD-MCNC: 29 MG/DL (ref 8–26)
BUN BLD-MCNC: 31 MG/DL (ref 8–26)
BUN SERPL-MCNC: 27 MG/DL (ref 8–23)
CALCIUM SERPL-MCNC: 9.3 MG/DL (ref 8.6–10.4)
CHLORIDE BLD-SCNC: 105 MMOL/L (ref 98–107)
CHLORIDE SERPL-SCNC: 103 MMOL/L (ref 98–107)
CO2 SERPL-SCNC: 23 MMOL/L (ref 20–31)
CREAT SERPL-MCNC: 1.1 MG/DL (ref 0.6–0.9)
EGFR, POC: 47 ML/MIN/1.73M2
GFR, ESTIMATED: 47 ML/MIN/1.73M2
GLUCOSE BLD-MCNC: 161 MG/DL (ref 74–100)
GLUCOSE SERPL-MCNC: 144 MG/DL (ref 74–99)
HCT VFR BLD AUTO: 31 % (ref 36–46)
PLATELET # BLD AUTO: 185 K/UL (ref 138–453)
POC CREATININE: 1.1 MG/DL (ref 0.51–1.19)
POC HEMOGLOBIN (CALC): 10.7 G/DL (ref 12–16)
POTASSIUM BLD-SCNC: 5.1 MMOL/L (ref 3.5–4.5)
POTASSIUM BLD-SCNC: 5.4 MMOL/L (ref 3.5–4.5)
POTASSIUM SERPL-SCNC: 4.6 MMOL/L (ref 3.7–5.3)
SODIUM BLD-SCNC: 140 MMOL/L (ref 138–146)
SODIUM SERPL-SCNC: 139 MMOL/L (ref 136–145)

## 2024-12-13 PROCEDURE — 6370000000 HC RX 637 (ALT 250 FOR IP): Performed by: STUDENT IN AN ORGANIZED HEALTH CARE EDUCATION/TRAINING PROGRAM

## 2024-12-13 PROCEDURE — 92920 PRQ TRLUML C ANGIOP 1ART&/BR: CPT | Performed by: STUDENT IN AN ORGANIZED HEALTH CARE EDUCATION/TRAINING PROGRAM

## 2024-12-13 PROCEDURE — C1874 STENT, COATED/COV W/DEL SYS: HCPCS | Performed by: STUDENT IN AN ORGANIZED HEALTH CARE EDUCATION/TRAINING PROGRAM

## 2024-12-13 PROCEDURE — C9600 PERC DRUG-EL COR STENT SING: HCPCS | Performed by: STUDENT IN AN ORGANIZED HEALTH CARE EDUCATION/TRAINING PROGRAM

## 2024-12-13 PROCEDURE — 82435 ASSAY OF BLOOD CHLORIDE: CPT

## 2024-12-13 PROCEDURE — 99153 MOD SED SAME PHYS/QHP EA: CPT | Performed by: STUDENT IN AN ORGANIZED HEALTH CARE EDUCATION/TRAINING PROGRAM

## 2024-12-13 PROCEDURE — C1894 INTRO/SHEATH, NON-LASER: HCPCS | Performed by: STUDENT IN AN ORGANIZED HEALTH CARE EDUCATION/TRAINING PROGRAM

## 2024-12-13 PROCEDURE — C1753 CATH, INTRAVAS ULTRASOUND: HCPCS | Performed by: STUDENT IN AN ORGANIZED HEALTH CARE EDUCATION/TRAINING PROGRAM

## 2024-12-13 PROCEDURE — 92924 PRQ TRLUML C ATHRC 1 ART&/BR: CPT | Performed by: STUDENT IN AN ORGANIZED HEALTH CARE EDUCATION/TRAINING PROGRAM

## 2024-12-13 PROCEDURE — 92978 ENDOLUMINL IVUS OCT C 1ST: CPT | Performed by: STUDENT IN AN ORGANIZED HEALTH CARE EDUCATION/TRAINING PROGRAM

## 2024-12-13 PROCEDURE — 82565 ASSAY OF CREATININE: CPT

## 2024-12-13 PROCEDURE — C1887 CATHETER, GUIDING: HCPCS | Performed by: STUDENT IN AN ORGANIZED HEALTH CARE EDUCATION/TRAINING PROGRAM

## 2024-12-13 PROCEDURE — 2580000003 HC RX 258: Performed by: STUDENT IN AN ORGANIZED HEALTH CARE EDUCATION/TRAINING PROGRAM

## 2024-12-13 PROCEDURE — 82947 ASSAY GLUCOSE BLOOD QUANT: CPT

## 2024-12-13 PROCEDURE — 80048 BASIC METABOLIC PNL TOTAL CA: CPT

## 2024-12-13 PROCEDURE — C1892 INTRO/SHEATH,FIXED,PEEL-AWAY: HCPCS | Performed by: STUDENT IN AN ORGANIZED HEALTH CARE EDUCATION/TRAINING PROGRAM

## 2024-12-13 PROCEDURE — C1769 GUIDE WIRE: HCPCS | Performed by: STUDENT IN AN ORGANIZED HEALTH CARE EDUCATION/TRAINING PROGRAM

## 2024-12-13 PROCEDURE — C1724 CATH, TRANS ATHEREC,ROTATION: HCPCS | Performed by: STUDENT IN AN ORGANIZED HEALTH CARE EDUCATION/TRAINING PROGRAM

## 2024-12-13 PROCEDURE — 2060000000 HC ICU INTERMEDIATE R&B

## 2024-12-13 PROCEDURE — 84295 ASSAY OF SERUM SODIUM: CPT

## 2024-12-13 PROCEDURE — C1760 CLOSURE DEV, VASC: HCPCS | Performed by: STUDENT IN AN ORGANIZED HEALTH CARE EDUCATION/TRAINING PROGRAM

## 2024-12-13 PROCEDURE — B240ZZ3 ULTRASONOGRAPHY OF SINGLE CORONARY ARTERY, INTRAVASCULAR: ICD-10-PCS | Performed by: STUDENT IN AN ORGANIZED HEALTH CARE EDUCATION/TRAINING PROGRAM

## 2024-12-13 PROCEDURE — 85014 HEMATOCRIT: CPT

## 2024-12-13 PROCEDURE — 99152 MOD SED SAME PHYS/QHP 5/>YRS: CPT | Performed by: STUDENT IN AN ORGANIZED HEALTH CARE EDUCATION/TRAINING PROGRAM

## 2024-12-13 PROCEDURE — 84520 ASSAY OF UREA NITROGEN: CPT

## 2024-12-13 PROCEDURE — 92933 PRQ TRLML C ATHRC ST ANGIOP1: CPT | Performed by: STUDENT IN AN ORGANIZED HEALTH CARE EDUCATION/TRAINING PROGRAM

## 2024-12-13 PROCEDURE — 2709999900 HC NON-CHARGEABLE SUPPLY: Performed by: STUDENT IN AN ORGANIZED HEALTH CARE EDUCATION/TRAINING PROGRAM

## 2024-12-13 PROCEDURE — 92921 PR PRQ TRLUML CORONARY ANGIOPLASTY ADDL BRANCH: CPT | Performed by: STUDENT IN AN ORGANIZED HEALTH CARE EDUCATION/TRAINING PROGRAM

## 2024-12-13 PROCEDURE — 027036Z DILATION OF CORONARY ARTERY, ONE ARTERY WITH THREE DRUG-ELUTING INTRALUMINAL DEVICES, PERCUTANEOUS APPROACH: ICD-10-PCS | Performed by: STUDENT IN AN ORGANIZED HEALTH CARE EDUCATION/TRAINING PROGRAM

## 2024-12-13 PROCEDURE — 6360000004 HC RX CONTRAST MEDICATION: Performed by: STUDENT IN AN ORGANIZED HEALTH CARE EDUCATION/TRAINING PROGRAM

## 2024-12-13 PROCEDURE — 6360000002 HC RX W HCPCS

## 2024-12-13 PROCEDURE — 02C03ZZ EXTIRPATION OF MATTER FROM CORONARY ARTERY, ONE ARTERY, PERCUTANEOUS APPROACH: ICD-10-PCS | Performed by: STUDENT IN AN ORGANIZED HEALTH CARE EDUCATION/TRAINING PROGRAM

## 2024-12-13 PROCEDURE — 85049 AUTOMATED PLATELET COUNT: CPT

## 2024-12-13 PROCEDURE — 84132 ASSAY OF SERUM POTASSIUM: CPT

## 2024-12-13 PROCEDURE — 6360000002 HC RX W HCPCS: Performed by: STUDENT IN AN ORGANIZED HEALTH CARE EDUCATION/TRAINING PROGRAM

## 2024-12-13 PROCEDURE — C1725 CATH, TRANSLUMIN NON-LASER: HCPCS | Performed by: STUDENT IN AN ORGANIZED HEALTH CARE EDUCATION/TRAINING PROGRAM

## 2024-12-13 PROCEDURE — 92928 PRQ TCAT PLMT NTRAC ST 1 LES: CPT | Performed by: STUDENT IN AN ORGANIZED HEALTH CARE EDUCATION/TRAINING PROGRAM

## 2024-12-13 DEVICE — STENT ONYXNG35018UX ONYX 3.50X18RX
Type: IMPLANTABLE DEVICE | Status: FUNCTIONAL
Brand: ONYX FRONTIER™

## 2024-12-13 DEVICE — STENT ONYXNG30038UX ONYX 3.00X38RX
Type: IMPLANTABLE DEVICE | Status: FUNCTIONAL
Brand: ONYX FRONTIER™

## 2024-12-13 DEVICE — STENT ONYXNG25038UX ONYX 2.50X38RX
Type: IMPLANTABLE DEVICE | Status: FUNCTIONAL
Brand: ONYX FRONTIER™

## 2024-12-13 RX ORDER — SODIUM CHLORIDE 9 MG/ML
INJECTION, SOLUTION INTRAVENOUS PRN
Status: DISCONTINUED | OUTPATIENT
Start: 2024-12-13 | End: 2024-12-14 | Stop reason: HOSPADM

## 2024-12-13 RX ORDER — CLOPIDOGREL BISULFATE 75 MG/1
TABLET ORAL PRN
Status: DISCONTINUED | OUTPATIENT
Start: 2024-12-13 | End: 2024-12-13 | Stop reason: HOSPADM

## 2024-12-13 RX ORDER — LEVOTHYROXINE SODIUM 125 UG/1
125 TABLET ORAL DAILY
Status: DISCONTINUED | OUTPATIENT
Start: 2024-12-14 | End: 2024-12-14 | Stop reason: HOSPADM

## 2024-12-13 RX ORDER — AMLODIPINE BESYLATE 5 MG/1
5 TABLET ORAL NIGHTLY
Status: DISCONTINUED | OUTPATIENT
Start: 2024-12-13 | End: 2024-12-14 | Stop reason: HOSPADM

## 2024-12-13 RX ORDER — ACETAMINOPHEN 650 MG/1
650 SUPPOSITORY RECTAL EVERY 6 HOURS PRN
Status: DISCONTINUED | OUTPATIENT
Start: 2024-12-13 | End: 2024-12-14 | Stop reason: HOSPADM

## 2024-12-13 RX ORDER — HYDRALAZINE HYDROCHLORIDE 20 MG/ML
10 INJECTION INTRAMUSCULAR; INTRAVENOUS EVERY 4 HOURS PRN
Status: DISCONTINUED | OUTPATIENT
Start: 2024-12-13 | End: 2024-12-14 | Stop reason: HOSPADM

## 2024-12-13 RX ORDER — ONDANSETRON 4 MG/1
4 TABLET, ORALLY DISINTEGRATING ORAL EVERY 8 HOURS PRN
Status: DISCONTINUED | OUTPATIENT
Start: 2024-12-13 | End: 2024-12-14 | Stop reason: HOSPADM

## 2024-12-13 RX ORDER — ENOXAPARIN SODIUM 100 MG/ML
30 INJECTION SUBCUTANEOUS DAILY
Status: DISCONTINUED | OUTPATIENT
Start: 2024-12-14 | End: 2024-12-14 | Stop reason: HOSPADM

## 2024-12-13 RX ORDER — SODIUM CHLORIDE 0.9 % (FLUSH) 0.9 %
10 SYRINGE (ML) INJECTION PRN
Status: DISCONTINUED | OUTPATIENT
Start: 2024-12-13 | End: 2024-12-14 | Stop reason: HOSPADM

## 2024-12-13 RX ORDER — BIVALIRUDIN 250 MG/5ML
INJECTION, POWDER, LYOPHILIZED, FOR SOLUTION INTRAVENOUS PRN
Status: DISCONTINUED | OUTPATIENT
Start: 2024-12-13 | End: 2024-12-13 | Stop reason: HOSPADM

## 2024-12-13 RX ORDER — ISOSORBIDE MONONITRATE 60 MG/1
60 TABLET, EXTENDED RELEASE ORAL 2 TIMES DAILY
Status: DISCONTINUED | OUTPATIENT
Start: 2024-12-13 | End: 2024-12-14 | Stop reason: HOSPADM

## 2024-12-13 RX ORDER — CLOPIDOGREL BISULFATE 75 MG/1
75 TABLET ORAL DAILY
Status: DISCONTINUED | OUTPATIENT
Start: 2024-12-14 | End: 2024-12-14 | Stop reason: HOSPADM

## 2024-12-13 RX ORDER — ATORVASTATIN CALCIUM 40 MG/1
40 TABLET, FILM COATED ORAL NIGHTLY
Status: DISCONTINUED | OUTPATIENT
Start: 2024-12-13 | End: 2024-12-14 | Stop reason: HOSPADM

## 2024-12-13 RX ORDER — POTASSIUM CHLORIDE 1500 MG/1
40 TABLET, EXTENDED RELEASE ORAL PRN
Status: DISCONTINUED | OUTPATIENT
Start: 2024-12-13 | End: 2024-12-13

## 2024-12-13 RX ORDER — ONDANSETRON 2 MG/ML
4 INJECTION INTRAMUSCULAR; INTRAVENOUS EVERY 6 HOURS PRN
Status: DISCONTINUED | OUTPATIENT
Start: 2024-12-13 | End: 2024-12-14 | Stop reason: HOSPADM

## 2024-12-13 RX ORDER — ROPINIROLE 0.25 MG/1
0.25 TABLET, FILM COATED ORAL 3 TIMES DAILY
Status: DISCONTINUED | OUTPATIENT
Start: 2024-12-13 | End: 2024-12-14 | Stop reason: HOSPADM

## 2024-12-13 RX ORDER — POLYETHYLENE GLYCOL 3350 17 G/17G
17 POWDER, FOR SOLUTION ORAL DAILY PRN
Status: DISCONTINUED | OUTPATIENT
Start: 2024-12-13 | End: 2024-12-14 | Stop reason: HOSPADM

## 2024-12-13 RX ORDER — SODIUM CHLORIDE 0.9 % (FLUSH) 0.9 %
5-40 SYRINGE (ML) INJECTION PRN
Status: DISCONTINUED | OUTPATIENT
Start: 2024-12-13 | End: 2024-12-14 | Stop reason: HOSPADM

## 2024-12-13 RX ORDER — ACETAMINOPHEN 325 MG/1
650 TABLET ORAL EVERY 6 HOURS PRN
Status: DISCONTINUED | OUTPATIENT
Start: 2024-12-13 | End: 2024-12-14 | Stop reason: HOSPADM

## 2024-12-13 RX ORDER — POTASSIUM CHLORIDE 1500 MG/1
40 TABLET, EXTENDED RELEASE ORAL PRN
Status: DISCONTINUED | OUTPATIENT
Start: 2024-12-13 | End: 2024-12-14 | Stop reason: HOSPADM

## 2024-12-13 RX ORDER — SODIUM CHLORIDE 0.9 % (FLUSH) 0.9 %
5-40 SYRINGE (ML) INJECTION EVERY 12 HOURS SCHEDULED
Status: DISCONTINUED | OUTPATIENT
Start: 2024-12-13 | End: 2024-12-14 | Stop reason: HOSPADM

## 2024-12-13 RX ORDER — POTASSIUM CHLORIDE 7.45 MG/ML
10 INJECTION INTRAVENOUS PRN
Status: DISCONTINUED | OUTPATIENT
Start: 2024-12-13 | End: 2024-12-13

## 2024-12-13 RX ORDER — POTASSIUM CHLORIDE 7.45 MG/ML
10 INJECTION INTRAVENOUS PRN
Status: DISCONTINUED | OUTPATIENT
Start: 2024-12-13 | End: 2024-12-14 | Stop reason: HOSPADM

## 2024-12-13 RX ORDER — NITROGLYCERIN 20 MG/100ML
5-200 INJECTION INTRAVENOUS CONTINUOUS
Status: DISCONTINUED | OUTPATIENT
Start: 2024-12-13 | End: 2024-12-14 | Stop reason: HOSPADM

## 2024-12-13 RX ORDER — MIDAZOLAM HYDROCHLORIDE 1 MG/ML
INJECTION, SOLUTION INTRAMUSCULAR; INTRAVENOUS PRN
Status: DISCONTINUED | OUTPATIENT
Start: 2024-12-13 | End: 2024-12-13 | Stop reason: HOSPADM

## 2024-12-13 RX ORDER — MAGNESIUM SULFATE IN WATER 40 MG/ML
2000 INJECTION, SOLUTION INTRAVENOUS PRN
Status: DISCONTINUED | OUTPATIENT
Start: 2024-12-13 | End: 2024-12-14 | Stop reason: HOSPADM

## 2024-12-13 RX ORDER — SODIUM CHLORIDE 0.9 % (FLUSH) 0.9 %
10 SYRINGE (ML) INJECTION EVERY 12 HOURS SCHEDULED
Status: DISCONTINUED | OUTPATIENT
Start: 2024-12-13 | End: 2024-12-14 | Stop reason: HOSPADM

## 2024-12-13 RX ORDER — METOPROLOL SUCCINATE 25 MG/1
25 TABLET, EXTENDED RELEASE ORAL DAILY
Status: DISCONTINUED | OUTPATIENT
Start: 2024-12-13 | End: 2024-12-14 | Stop reason: HOSPADM

## 2024-12-13 RX ORDER — PROMETHAZINE HYDROCHLORIDE 12.5 MG/1
12.5 TABLET ORAL EVERY 6 HOURS PRN
Status: DISCONTINUED | OUTPATIENT
Start: 2024-12-13 | End: 2024-12-14 | Stop reason: HOSPADM

## 2024-12-13 RX ORDER — NITROGLYCERIN 20 MG/100ML
INJECTION INTRAVENOUS PRN
Status: DISCONTINUED | OUTPATIENT
Start: 2024-12-13 | End: 2024-12-13 | Stop reason: HOSPADM

## 2024-12-13 RX ORDER — SODIUM CHLORIDE 9 MG/ML
INJECTION, SOLUTION INTRAVENOUS CONTINUOUS
Status: DISCONTINUED | OUTPATIENT
Start: 2024-12-13 | End: 2024-12-13

## 2024-12-13 RX ORDER — AMINOPHYLLINE 25 MG/ML
INJECTION, SOLUTION INTRAVENOUS PRN
Status: DISCONTINUED | OUTPATIENT
Start: 2024-12-13 | End: 2024-12-13 | Stop reason: HOSPADM

## 2024-12-13 RX ORDER — FENTANYL CITRATE 50 UG/ML
INJECTION, SOLUTION INTRAMUSCULAR; INTRAVENOUS PRN
Status: DISCONTINUED | OUTPATIENT
Start: 2024-12-13 | End: 2024-12-13 | Stop reason: HOSPADM

## 2024-12-13 RX ORDER — ENOXAPARIN SODIUM 100 MG/ML
40 INJECTION SUBCUTANEOUS DAILY
Status: DISCONTINUED | OUTPATIENT
Start: 2024-12-13 | End: 2024-12-13

## 2024-12-13 RX ORDER — ASPIRIN 81 MG/1
81 TABLET ORAL DAILY
Status: DISCONTINUED | OUTPATIENT
Start: 2024-12-14 | End: 2024-12-14 | Stop reason: HOSPADM

## 2024-12-13 RX ORDER — LABETALOL HYDROCHLORIDE 5 MG/ML
10 INJECTION, SOLUTION INTRAVENOUS EVERY 4 HOURS PRN
Status: DISCONTINUED | OUTPATIENT
Start: 2024-12-13 | End: 2024-12-14 | Stop reason: HOSPADM

## 2024-12-13 RX ORDER — IOPAMIDOL 755 MG/ML
INJECTION, SOLUTION INTRAVASCULAR PRN
Status: DISCONTINUED | OUTPATIENT
Start: 2024-12-13 | End: 2024-12-13 | Stop reason: HOSPADM

## 2024-12-13 RX ADMIN — ATORVASTATIN CALCIUM 40 MG: 40 TABLET, FILM COATED ORAL at 20:50

## 2024-12-13 RX ADMIN — ROPINIROLE HYDROCHLORIDE 0.25 MG: 0.25 TABLET, FILM COATED ORAL at 20:50

## 2024-12-13 RX ADMIN — SODIUM CHLORIDE, PRESERVATIVE FREE 10 ML: 5 INJECTION INTRAVENOUS at 20:50

## 2024-12-13 RX ADMIN — NITROGLYCERIN 5 MCG/MIN: 20 INJECTION INTRAVENOUS at 17:23

## 2024-12-13 RX ADMIN — ISOSORBIDE MONONITRATE 60 MG: 60 TABLET, EXTENDED RELEASE ORAL at 20:51

## 2024-12-13 RX ADMIN — HYDRALAZINE HYDROCHLORIDE 10 MG: 20 INJECTION INTRAMUSCULAR; INTRAVENOUS at 17:13

## 2024-12-13 RX ADMIN — SODIUM CHLORIDE: 9 INJECTION, SOLUTION INTRAVENOUS at 12:18

## 2024-12-13 RX ADMIN — AMLODIPINE BESYLATE 5 MG: 5 TABLET ORAL at 20:50

## 2024-12-13 NOTE — H&P
[facility]  Pre- Procedure History and Physical/Update          Patient Name:  Rossana Alcantara  MRN:    [id number]  YOB: 1930  Date of evaluation:  12/13/2024    Procedure:                           Cardiac Cath + PCI      Indication for procedure:     RCA disease     Please refer to the consult note / H&P completed by Alessia Armenta APRN - CNP on 11/25/24 in the medical record and note that:       [x] I have examined the patient and reviewed the H&P/Consult and there are no changes to be made to the assessment or plan.    [] I have examined the patient and reviewed the H&P/Consult and have noted the following changes:        Past Medical History:   Diagnosis Date    Accelerating angina (HCC)     Arthritis     CAD (coronary artery disease) 5*9/14    cabg    Carotid bruit     bilateral     DM (diabetes mellitus) (HCC)     HTN (hypertension)     Hypothyroid     Mitral regurgitation     mild     Paroxysmal A-fib (HCC)     Not on coumain per pt's wishes     Pericarditis     2009       Past Surgical History:   Procedure Laterality Date    BREAST LUMPECTOMY      CARDIAC CATHETERIZATION      2009, non critical at that time    CARDIAC PROCEDURE N/A 9/20/2024    Left heart cath / coronary angiography performed by Lebron Andres MD at [facility] CARDIAC CATH LAB    CHOLECYSTECTOMY      CORONARY ARTERY BYPASS GRAFT  5/9/2014    CABG X 6    HYSTERECTOMY (CERVIX STATUS UNKNOWN)      SHOULDER SURGERY      TONSILLECTOMY          reports that she has never smoked. She has never used smokeless tobacco. She reports that she does not drink alcohol and does not use drugs.    Prior to Admission medications    Medication Sig Start Date End Date Taking? Authorizing Provider   metFORMIN (GLUCOPHAGE) 1000 MG tablet Take 1 tablet by mouth daily (with breakfast)   Yes Provider, MD Sarah   isosorbide mononitrate (IMDUR) 60 MG extended release tablet Take 1 tablet by mouth in the morning and at bedtime

## 2024-12-13 NOTE — CARE COORDINATION
Case Management Assessment  Initial Evaluation    Date/Time of Evaluation: 12/13/2024 5:15 PM  Assessment Completed by: Magui Moctezuma RN    If patient is discharged prior to next notation, then this note serves as note for discharge by case management.    Patient Name: Rossana Alcantara                   YOB: 1930  Diagnosis: Coronary artery disease with angina pectoris, unspecified vessel or lesion type, unspecified whether native or transplanted heart (HCC) [I25.119]  S/P angioplasty with stent [Z95.820]  Post PTCA [Z98.61]                   Date / Time: 12/13/2024 11:16 AM    Patient Admission Status: Inpatient   Readmission Risk (Low < 19, Mod (19-27), High > 27): Readmission Risk Score: 16.9    Current PCP: Modesto Ayala MD  PCP verified by CM? (P) Yes    Chart Reviewed: Yes      History Provided by: (P) Patient  Patient Orientation: (P) Alert and Oriented    Patient Cognition: (P) Alert    Hospitalization in the last 30 days (Readmission):  No    If yes, Readmission Assessment in  Navigator will be completed.    Advance Directives:      Code Status: Full Code   Patient's Primary Decision Maker is: (P) Legal Next of Kin      Discharge Planning:    Patient lives with: (P) Alone Type of Home: (P) House  Primary Care Giver: (P) Self  Patient Support Systems include: (P) Children   Current Financial resources: (P) Medicare  Current community resources:    Current services prior to admission: (P) None            Current DME:              Type of Home Care services:  (P) None    ADLS  Prior functional level: (P) Independent in ADLs/IADLs  Current functional level: (P) Independent in ADLs/IADLs    PT AM-PAC:   /24  OT AM-PAC:   /24    Family can provide assistance at DC: (P) Yes  Would you like Case Management to discuss the discharge plan with any other family members/significant others, and if so, who? (P) No  Plans to Return to Present Housing: (P) Yes  Other Identified Issues/Barriers to  RETURNING to current housing: none  Potential Assistance needed at discharge: (P) N/A            Potential DME:    Patient expects to discharge to: (P) House  Plan for transportation at discharge: (P) Family    Financial    Payor: [facility] MEDICARE / Plan: SARAN MEDIRENYUE ESSENTIAL/PLUS / Product Type: *No Product type* /     Does insurance require precert for SNF: Yes    Potential assistance Purchasing Medications: No  Meds-to-Beds request:        [id number] - [place], [place] - [address] [phone] - F [phone]  [address] [address]  Phone: [phone] Fax: [phone]    [facility] - [place] - [address] - P [phone] - F [phone]  [facility]  [place] [address]  Phone: [phone] Fax: [phone]      Notes:    Factors facilitating achievement of predicted outcomes: Family support, Cooperative, and Pleasant    Barriers to discharge: Medical complications    Additional Case Management Notes: patient returning home alone. She is independent. She denies needs and has transportation home    Patient has a qualifying diagnosis for cardiac rehabilitation.     Education provided to patient regarding the health benefits of participating in a cardiac rehabilitation program. Handout provided with an overview of cardiac rehabilitation from the [employer].     Patient agreeable: Yes    Freedom of choice provided.     Referral made to :   [x] [facility]  [] [facility]  [] [facility]  [] [facility]  [] [facility]  [] Other    The Plan for Transition of Care is related to the following treatment goals of Coronary artery disease with angina pectoris, unspecified vessel or lesion type, unspecified whether native or transplanted heart (HCC) [I25.119]  S/P angioplasty with stent [Z95.820]  Post PTCA [Z98.61]    IF APPLICABLE: The Patient and/or patient representative Rossana and her family were provided with

## 2024-12-13 NOTE — PROGRESS NOTES
Patient admitted, consent signed and questions answered.  Call light to reach with side rails up 2 of 2. Bilateral Groin clipped with writer and Brooke present.  RN at bedside with patient.  History and physical needed. VS & Pulses obtained and documented. Will continue to monitor.

## 2024-12-13 NOTE — PLAN OF CARE
Problem: Chronic Conditions and Co-morbidities  Goal: Patient's chronic conditions and co-morbidity symptoms are monitored and maintained or improved  Flowsheets  Taken 12/13/2024 1827  Care Plan - Patient's Chronic Conditions and Co-Morbidity Symptoms are Monitored and Maintained or Improved: Monitor and assess patient's chronic conditions and comorbid symptoms for stability, deterioration, or improvement  Taken 12/13/2024 1807  Care Plan - Patient's Chronic Conditions and Co-Morbidity Symptoms are Monitored and Maintained or Improved: Monitor and assess patient's chronic conditions and comorbid symptoms for stability, deterioration, or improvement     Problem: Discharge Planning  Goal: Discharge to home or other facility with appropriate resources  Recent Flowsheet Documentation  Taken 12/13/2024 1807 by Elyse Delatorre, RN  Discharge to home or other facility with appropriate resources: Identify barriers to discharge with patient and caregiver  Taken 12/13/2024 1659 by Elyse Delatorre, RN  Discharge to home or other facility with appropriate resources: Identify barriers to discharge with patient and caregiver

## 2024-12-14 VITALS
RESPIRATION RATE: 16 BRPM | HEIGHT: 64 IN | BODY MASS INDEX: 23.26 KG/M2 | OXYGEN SATURATION: 97 % | HEART RATE: 90 BPM | TEMPERATURE: 98 F | WEIGHT: 136.24 LBS | DIASTOLIC BLOOD PRESSURE: 55 MMHG | SYSTOLIC BLOOD PRESSURE: 138 MMHG

## 2024-12-14 LAB
ANION GAP SERPL CALCULATED.3IONS-SCNC: 14 MMOL/L (ref 9–16)
BASOPHILS # BLD: 0 K/UL (ref 0–0.2)
BASOPHILS NFR BLD: 0 % (ref 0–2)
BUN SERPL-MCNC: 27 MG/DL (ref 8–23)
CALCIUM SERPL-MCNC: 8.9 MG/DL (ref 8.6–10.4)
CHLORIDE SERPL-SCNC: 100 MMOL/L (ref 98–107)
CO2 SERPL-SCNC: 22 MMOL/L (ref 20–31)
CREAT SERPL-MCNC: 1.2 MG/DL (ref 0.6–0.9)
EOSINOPHIL # BLD: 0.06 K/UL (ref 0–0.44)
EOSINOPHILS RELATIVE PERCENT: 1 % (ref 1–4)
ERYTHROCYTE [DISTWIDTH] IN BLOOD BY AUTOMATED COUNT: 14.4 % (ref 11.8–14.4)
GFR, ESTIMATED: 42 ML/MIN/1.73M2
GLUCOSE SERPL-MCNC: 241 MG/DL (ref 74–99)
HCT VFR BLD AUTO: 28.3 % (ref 36.3–47.1)
HGB BLD-MCNC: 9 G/DL (ref 11.9–15.1)
IMM GRANULOCYTES # BLD AUTO: 0 K/UL (ref 0–0.3)
IMM GRANULOCYTES NFR BLD: 0 %
LYMPHOCYTES NFR BLD: 1.79 K/UL (ref 1.1–3.7)
LYMPHOCYTES RELATIVE PERCENT: 32 % (ref 24–43)
MCH RBC QN AUTO: 38.3 PG (ref 25.2–33.5)
MCHC RBC AUTO-ENTMCNC: 31.8 G/DL (ref 28.4–34.8)
MCV RBC AUTO: 120.4 FL (ref 82.6–102.9)
MONOCYTES NFR BLD: 0.34 K/UL (ref 0.1–1.2)
MONOCYTES NFR BLD: 6 % (ref 3–12)
MORPHOLOGY: ABNORMAL
NEUTROPHILS NFR BLD: 61 % (ref 36–65)
NEUTS SEG NFR BLD: 3.41 K/UL (ref 1.5–8.1)
NRBC BLD-RTO: 0 PER 100 WBC
PLATELET # BLD AUTO: 186 K/UL (ref 138–453)
PMV BLD AUTO: 11.3 FL (ref 8.1–13.5)
POTASSIUM SERPL-SCNC: 4.1 MMOL/L (ref 3.7–5.3)
RBC # BLD AUTO: 2.35 M/UL (ref 3.95–5.11)
SODIUM SERPL-SCNC: 136 MMOL/L (ref 136–145)
WBC OTHER # BLD: 5.6 K/UL (ref 3.5–11.3)

## 2024-12-14 PROCEDURE — 2580000003 HC RX 258: Performed by: STUDENT IN AN ORGANIZED HEALTH CARE EDUCATION/TRAINING PROGRAM

## 2024-12-14 PROCEDURE — 36415 COLL VENOUS BLD VENIPUNCTURE: CPT

## 2024-12-14 PROCEDURE — 85025 COMPLETE CBC W/AUTO DIFF WBC: CPT

## 2024-12-14 PROCEDURE — 80048 BASIC METABOLIC PNL TOTAL CA: CPT

## 2024-12-14 PROCEDURE — 99239 HOSP IP/OBS DSCHRG MGMT >30: CPT | Performed by: NURSE PRACTITIONER

## 2024-12-14 PROCEDURE — 6370000000 HC RX 637 (ALT 250 FOR IP): Performed by: STUDENT IN AN ORGANIZED HEALTH CARE EDUCATION/TRAINING PROGRAM

## 2024-12-14 RX ADMIN — LEVOTHYROXINE SODIUM 125 MCG: 0.12 TABLET ORAL at 08:07

## 2024-12-14 RX ADMIN — ROPINIROLE HYDROCHLORIDE 0.25 MG: 0.25 TABLET, FILM COATED ORAL at 08:07

## 2024-12-14 RX ADMIN — ASPIRIN 81 MG: 81 TABLET, COATED ORAL at 08:07

## 2024-12-14 RX ADMIN — CLOPIDOGREL BISULFATE 75 MG: 75 TABLET ORAL at 08:07

## 2024-12-14 RX ADMIN — METOPROLOL SUCCINATE 25 MG: 25 TABLET, EXTENDED RELEASE ORAL at 08:07

## 2024-12-14 RX ADMIN — SODIUM CHLORIDE, PRESERVATIVE FREE 10 ML: 5 INJECTION INTRAVENOUS at 08:08

## 2024-12-14 RX ADMIN — ISOSORBIDE MONONITRATE 60 MG: 60 TABLET, EXTENDED RELEASE ORAL at 08:08

## 2024-12-14 NOTE — DISCHARGE SUMMARY
[facility]  Discharge Note                 Name:  Rossana Alcantara  YOB: 1930  Social Security Number:  [id number]  Medical Record Number:  [id number]    Date of Admission:  12/13/2024  Date of Discharge:  12/14/2024    Admitting physician: Lebron Casas MD    Discharge Attending: WENCESLAO Kay CNP, CNP  Primary Care Physician: Modesto Ayala MD  Consultants: Cardiology  Discharge to home in stable condition    HOSPITAL ADMISSION PROBLEM LIST:  Patient Active Problem List   Diagnosis    CAD (coronary artery disease)    Angina pectoris (HCC)    HTN (hypertension)    Left lower lobe pneumonia    Pneumonia    Hypertension, essential    Hypothyroidism    ASCVD (arteriosclerotic cardiovascular disease)    Chest pain    NSTEMI (non-ST elevated myocardial infarction) ([facility])    Acute on chronic congestive heart failure (HCC)    Hyponatremia    Acute on chronic anemia    Iron deficiency    Hyperlipidemia    Acute hypoxic respiratory failure    Paroxysmal A-fib (HCC)    AMADO (acute kidney injury) ([facility])    Ischemic cardiomyopathy    Left bundle branch block    Troponin level elevated    Debility    S/P angioplasty with stent    Post PTCA         Procedures:cardiac catheterization    HOSPITAL COURSE :           The patient was admitted for: CAD, staged PCI  Hospital Procedures if any: cath w/ stent placement  Medications changes recommendation: see list  Follow Up Plan: F/U clinic 1 month      Discharge exam:   Vitals:    12/14/24 0343   BP: 132/60   Pulse: 88   Resp: 18   Temp: 98.2 °F (36.8 °C)   SpO2: 97%     Neuro: normal  Chest: Clear to ausculation. No wheezing.  Cardiac: Regular rate. s1 and s2 auscultated. No murmur noted.  Abdomen/groin: soft, non-tender, without masses or organomegaly  Lower extremity edema: none    Discharge Medications:     Medication List      CONTINUE taking these medications     amLODIPine 5 MG tablet; Commonly known as: NORVASC; Take 1 tablet  by   mouth nightly   aspirin 81 MG tablet   atorvastatin 40 MG tablet; Commonly known as: LIPITOR; Take 1 tablet by   mouth daily   clopidogrel 75 MG tablet; Commonly known as: PLAVIX; Take 1 tablet by   mouth daily   estradiol 0.5 MG tablet; Commonly known as: ESTRACE; Take 1 tablet by   mouth in the morning and at bedtime   famotidine 20 MG tablet; Commonly known as: PEPCID; Take 1 tablet by   mouth daily   furosemide 40 MG tablet; Commonly known as: LASIX; Take 1 tablet by   mouth daily   Handicap Placard Misc; by Does not apply route Duration: 12 months/   Dx:cardiac   isosorbide mononitrate 60 MG extended release tablet; Commonly known as:   IMDUR; Take 1 tablet by mouth in the morning and at bedtime   levothyroxine 125 MCG tablet; Commonly known as: SYNTHROID; Take 1   tablet by mouth Daily   metFORMIN 1000 MG tablet; Commonly known as: GLUCOPHAGE   metoprolol succinate 25 MG extended release tablet; Commonly known as:   TOPROL XL; Take 1 tablet by mouth daily   nitroGLYCERIN 0.4 MG SL tablet; Commonly known as: NITROSTAT; Place 1   tablet under the tongue every 5 minutes as needed for Chest pain up to max   of 3 total doses. If no relief after 1 dose, call 911.   rOPINIRole 0.25 MG tablet; Commonly known as: REQUIP; Take 1 tablet by   mouth 3 times daily   therapeutic multivitamin-minerals tablet        Echo:  09/19/24     ECHO (TTE) COMPLETE (PRN CONTRAST/BUBBLE/STRAIN/3D) 09/21/2024  3:28 PM (Final)     Interpretation Summary    Left Ventricle: Mildly reduced left ventricular systolic function with a visually estimated EF of 30 - 35%. EF by 2D Simpsons Biplane is 30%. Left ventricle size is normal. Increased wall thickness. Findings consistent with mild concentric hypertrophy. Normal wall motion. Abnormal diastolic function.    Aortic Valve: Trileaflet valve. Mildly calcified noncoronary cusp.    Mitral Valve: Moderate regurgitation.    Tricuspid Valve: Mild to moderate regurgitation. The estimated RVSP is

## 2024-12-14 NOTE — CARE COORDINATION
Transitional planning    Spoke to patient about plan for discharge. Plan is home independent. She has support and transportation. Family checks on her every day. She is agreeable to this plan.

## 2024-12-14 NOTE — PLAN OF CARE
Problem: Safety - Adult  Goal: Free from fall injury  Outcome: Progressing     Problem: ABCDS Injury Assessment  Goal: Absence of physical injury  Outcome: Progressing     Problem: Chronic Conditions and Co-morbidities  Goal: Patient's chronic conditions and co-morbidity symptoms are monitored and maintained or improved  12/14/2024 0247 by Ela Blackburn RN  Outcome: Progressing  Flowsheets (Taken 12/13/2024 1915)  Care Plan - Patient's Chronic Conditions and Co-Morbidity Symptoms are Monitored and Maintained or Improved:   Monitor and assess patient's chronic conditions and comorbid symptoms for stability, deterioration, or improvement   Collaborate with multidisciplinary team to address chronic and comorbid conditions and prevent exacerbation or deterioration   Update acute care plan with appropriate goals if chronic or comorbid symptoms are exacerbated and prevent overall improvement and discharge  12/13/2024 1827 by Elyse Delatorre RN  Flowsheets  Taken 12/13/2024 1827  Care Plan - Patient's Chronic Conditions and Co-Morbidity Symptoms are Monitored and Maintained or Improved: Monitor and assess patient's chronic conditions and comorbid symptoms for stability, deterioration, or improvement  Taken 12/13/2024 1807  Care Plan - Patient's Chronic Conditions and Co-Morbidity Symptoms are Monitored and Maintained or Improved: Monitor and assess patient's chronic conditions and comorbid symptoms for stability, deterioration, or improvement     Problem: Discharge Planning  Goal: Discharge to home or other facility with appropriate resources  Outcome: Progressing  Flowsheets  Taken 12/13/2024 1915 by Ela Blackburn RN  Discharge to home or other facility with appropriate resources:   Identify barriers to discharge with patient and caregiver   Arrange for needed discharge resources and transportation as appropriate   Identify discharge learning needs (meds, wound care, etc)   Refer to discharge planning if patient

## (undated) DEVICE — KIT MICRO INTRO 4FR STIFF 40CM NIGHTENALL TUNGSTEN 7SMT

## (undated) DEVICE — GUIDEWIRE 35/260/FC/PTFE/3J: Brand: GUIDEWIRE

## (undated) DEVICE — ANGIOGRAPHIC CATHETER: Brand: EXPO™

## (undated) DEVICE — GUIDE CATHETER: Brand: RUNWAY™

## (undated) DEVICE — INTRODUCER SHTH 6FR L11CM 0.038IN STD SIDEPRT EXTN 3 W

## (undated) DEVICE — PRE-CONNECTED EXCHANGEABLE BURR CATHETER AND BURR ADVANCING DEVICE: Brand: ROTAPRO™

## (undated) DEVICE — PERCLOSE PROGLIDE™ SUTURE-MEDIATED CLOSURE SYSTEM: Brand: PERCLOSE PROGLIDE™

## (undated) DEVICE — CATHETER DIAG AD 6FR L100CM COR GRN HYDRPHLC NYL IM W/O

## (undated) DEVICE — EXCHANGE DEVICE: Brand: TRAPPER™

## (undated) DEVICE — CATHETER INTVENT 0.014 IN 150 CM TURNPIKE SPRL

## (undated) DEVICE — Device: Brand: MINAMO

## (undated) DEVICE — TRAY SURG CUST CRD CATH SVMMC

## (undated) DEVICE — Device

## (undated) DEVICE — RUNTHROUGH NS EXTRA FLOPPY PTCA GUIDEWIRE: Brand: RUNTHROUGH

## (undated) DEVICE — CATHETER GUID 6FR L150CM RAP EXCHG L25CM TIP 5.1FR PUSHROD

## (undated) DEVICE — CATH BLLN ANGIO 2X20MM SC EUPHORA RX

## (undated) DEVICE — TRAY SURG CUST CRD CATH TOLEDO

## (undated) DEVICE — CATHETER INTVASC ULTRASOUND PLAT EAGLE EYE PHARM DIGITAL

## (undated) DEVICE — DEVICE DRIVE ROTAWIRE FLOPPY

## (undated) DEVICE — CATHETER GUID AD 6FR L100CM COR PERIPH GRN NYL PTFE AL 0.75